# Patient Record
Sex: MALE | Race: BLACK OR AFRICAN AMERICAN | NOT HISPANIC OR LATINO | Employment: FULL TIME | URBAN - METROPOLITAN AREA
[De-identification: names, ages, dates, MRNs, and addresses within clinical notes are randomized per-mention and may not be internally consistent; named-entity substitution may affect disease eponyms.]

---

## 2017-02-27 ENCOUNTER — TELEPHONE (OUTPATIENT)
Dept: INTERNAL MEDICINE | Facility: CLINIC | Age: 47
End: 2017-02-27

## 2017-02-27 NOTE — TELEPHONE ENCOUNTER
Called and left message on home #, cell # voicemail was full, also sending ERIKA message, appt for 3-2-17 has been canceled.  Dr. Beebe is out for family death.  Please call back to reschedule.  Can book with ISAI De Paz here in [place] .

## 2017-03-02 ENCOUNTER — PATIENT OUTREACH (OUTPATIENT)
Dept: ADMINISTRATIVE | Facility: HOSPITAL | Age: 47
End: 2017-03-02

## 2017-03-02 DIAGNOSIS — R97.20 ELEVATED PSA: ICD-10-CM

## 2017-03-02 DIAGNOSIS — Z00.00 HEALTHCARE MAINTENANCE: Primary | ICD-10-CM

## 2017-03-07 ENCOUNTER — OFFICE VISIT (OUTPATIENT)
Dept: URGENT CARE | Facility: CLINIC | Age: 47
End: 2017-03-07
Payer: COMMERCIAL

## 2017-03-07 ENCOUNTER — TELEPHONE (OUTPATIENT)
Dept: INTERNAL MEDICINE | Facility: CLINIC | Age: 47
End: 2017-03-07

## 2017-03-07 VITALS
BODY MASS INDEX: 30.45 KG/M2 | DIASTOLIC BLOOD PRESSURE: 90 MMHG | WEIGHT: 244.94 LBS | TEMPERATURE: 99 F | HEART RATE: 63 BPM | OXYGEN SATURATION: 99 % | SYSTOLIC BLOOD PRESSURE: 164 MMHG | HEIGHT: 75 IN

## 2017-03-07 DIAGNOSIS — I10 ESSENTIAL HYPERTENSION: Primary | ICD-10-CM

## 2017-03-07 PROCEDURE — 99213 OFFICE O/P EST LOW 20 MIN: CPT | Mod: S$GLB,,, | Performed by: PHYSICIAN ASSISTANT

## 2017-03-07 PROCEDURE — 3077F SYST BP >= 140 MM HG: CPT | Mod: S$GLB,,, | Performed by: PHYSICIAN ASSISTANT

## 2017-03-07 PROCEDURE — 3080F DIAST BP >= 90 MM HG: CPT | Mod: S$GLB,,, | Performed by: PHYSICIAN ASSISTANT

## 2017-03-07 PROCEDURE — 1160F RVW MEDS BY RX/DR IN RCRD: CPT | Mod: S$GLB,,, | Performed by: PHYSICIAN ASSISTANT

## 2017-03-07 PROCEDURE — 99999 PR PBB SHADOW E&M-EST. PATIENT-LVL III: CPT | Mod: PBBFAC,,, | Performed by: PHYSICIAN ASSISTANT

## 2017-03-07 RX ORDER — DESOXIMETASONE 0.5 MG/G
OINTMENT TOPICAL
Refills: 1 | COMMUNITY
Start: 2017-02-23

## 2017-03-07 NOTE — TELEPHONE ENCOUNTER
Pt is checked in for urgent care for bp. Informed pt that  does have a 9:40 appt available tomorrow if he wants to come for his preop. He states, he will let me know if he will be able to come tomorrow, if not he will keep appt for 3/13.

## 2017-03-07 NOTE — PROGRESS NOTES
Subjective:      Patient ID: Jimi Clark is a 46 y.o. male.    Chief Complaint: Hypertension    HPI Comments: Mr. Clark is a 45yo male that presents to Urgent Care for elevated BP.  Patient states he takes his BP medication every morning before leaving work and took it this morning around 4AM.  Patient is to have surgery on 3/16 (L knee scope).  Patient was seen for pre-op clearance around 11AM and states his BP was ~198/136 and 179/130.  Patient denies having any caffeinated beverages this morning nor has he been taking any decongestants.  He states he has been working a turnaround, has been busy with his wife and son and takes care of his mother who had a stroke.  He also states his diet has not been very good- he had 6-7lbs of crawfish this weekend and has not been drinking water like he should.  Patient no longer has any refills on his BP medication but has enough to get him through the weekend.  He does have a BP cuff at home but does not monitor it regularly.  Patient denies any symptoms at this time (HA, dizziness, blurred vision, etc.)    Hypertension   This is a new problem. Pertinent negatives include no chest pain, headaches, palpitations or shortness of breath. Past treatments include ACE inhibitors. There are no compliance problems.      Review of Systems   Constitutional: Positive for chills. Negative for diaphoresis and fever.   HENT: Positive for congestion, sinus pressure and sneezing. Negative for sore throat.    Eyes: Negative for visual disturbance (no blurred vision, double vision).   Respiratory: Positive for cough (dry). Negative for shortness of breath and wheezing.    Cardiovascular: Negative for chest pain, palpitations and leg swelling.   Gastrointestinal: Negative for abdominal pain, diarrhea, nausea and vomiting.   Musculoskeletal: Positive for joint swelling (L knee) and myalgias (achey around neck and shoulders).   Neurological: Negative for dizziness, light-headedness and  "headaches.       Objective:   BP (!) 164/90 (BP Location: Right arm, Patient Position: Sitting, BP Method: Manual) Comment: (L) arm 162 94  Pulse 63  Temp 98.5 °F (36.9 °C) (Oral)   Ht 6' 2.5" (1.892 m)  Wt 111.1 kg (244 lb 14.9 oz)  SpO2 99%  BMI 31.03 kg/m2  Physical Exam   Constitutional: He appears well-developed and well-nourished. He does not appear ill. No distress.   HENT:   Head: Normocephalic and atraumatic.   Right Ear: Tympanic membrane and ear canal normal.   Left Ear: Tympanic membrane and ear canal normal.   Nose: Nose normal. Right sinus exhibits no maxillary sinus tenderness and no frontal sinus tenderness. Left sinus exhibits no maxillary sinus tenderness and no frontal sinus tenderness.   Mouth/Throat: Uvula is midline and oropharynx is clear and moist.   Eyes: Pupils are equal, round, and reactive to light.   Cardiovascular: Normal rate, regular rhythm and normal heart sounds.    Pulmonary/Chest: Effort normal and breath sounds normal. No respiratory distress. He has no decreased breath sounds. He has no wheezes. He has no rhonchi. He has no rales.   Skin: Skin is warm and dry. No rash noted. He is not diaphoretic.   Psychiatric: He has a normal mood and affect. His speech is normal and behavior is normal. Thought content normal.     Assessment:      1. Essential hypertension       Plan:   Essential hypertension    Gave patient handout on BP and DASH diet.  Printed and reviewed AVS.     Patient to see Dr. Beebe at pre-op appointment tomorrow morning.  BP will be re-evaluated at that time.  If he develops any signs/symptoms (chest pain, severe HA, dizziness, blurred vision) in the mean time he should seek care immediately at the ER.   Recommend DASH diet and monitoring BP regularly at home.    Patient expresses understanding and agrees with treatment plan.  "

## 2017-03-07 NOTE — TELEPHONE ENCOUNTER
----- Message from Christi Heredia sent at 3/7/2017 11:54 AM CST -----  Contact: pt  Pt states he needs to move his 3/13 appointment-pre-op cause his blood pressure is high and they told him to see his PCP before can do surgery so pt would like to be seen today,please....[phone]

## 2017-03-08 ENCOUNTER — OFFICE VISIT (OUTPATIENT)
Dept: INTERNAL MEDICINE | Facility: CLINIC | Age: 47
End: 2017-03-08
Payer: COMMERCIAL

## 2017-03-08 VITALS
HEART RATE: 86 BPM | HEIGHT: 75 IN | WEIGHT: 246.25 LBS | SYSTOLIC BLOOD PRESSURE: 158 MMHG | BODY MASS INDEX: 30.62 KG/M2 | TEMPERATURE: 98 F | DIASTOLIC BLOOD PRESSURE: 102 MMHG

## 2017-03-08 DIAGNOSIS — M25.562 CHRONIC PAIN OF LEFT KNEE: Primary | ICD-10-CM

## 2017-03-08 DIAGNOSIS — G89.29 CHRONIC PAIN OF LEFT KNEE: Primary | ICD-10-CM

## 2017-03-08 DIAGNOSIS — R09.81 SINUS CONGESTION: ICD-10-CM

## 2017-03-08 DIAGNOSIS — I10 ESSENTIAL HYPERTENSION: ICD-10-CM

## 2017-03-08 DIAGNOSIS — Z01.818 PREOP EXAMINATION: ICD-10-CM

## 2017-03-08 PROCEDURE — 93005 ELECTROCARDIOGRAM TRACING: CPT | Mod: S$GLB,,, | Performed by: FAMILY MEDICINE

## 2017-03-08 PROCEDURE — 99999 PR PBB SHADOW E&M-EST. PATIENT-LVL III: CPT | Mod: PBBFAC,,, | Performed by: FAMILY MEDICINE

## 2017-03-08 PROCEDURE — 99214 OFFICE O/P EST MOD 30 MIN: CPT | Mod: S$GLB,,, | Performed by: FAMILY MEDICINE

## 2017-03-08 PROCEDURE — 1160F RVW MEDS BY RX/DR IN RCRD: CPT | Mod: S$GLB,,, | Performed by: FAMILY MEDICINE

## 2017-03-08 PROCEDURE — 93010 ELECTROCARDIOGRAM REPORT: CPT | Mod: S$GLB,,, | Performed by: INTERNAL MEDICINE

## 2017-03-08 PROCEDURE — 3080F DIAST BP >= 90 MM HG: CPT | Mod: S$GLB,,, | Performed by: FAMILY MEDICINE

## 2017-03-08 PROCEDURE — 3077F SYST BP >= 140 MM HG: CPT | Mod: S$GLB,,, | Performed by: FAMILY MEDICINE

## 2017-03-08 RX ORDER — TRAMADOL HYDROCHLORIDE 50 MG/1
1 TABLET ORAL
Refills: 0 | COMMUNITY
Start: 2017-02-22 | End: 2018-07-02

## 2017-03-08 RX ORDER — LISINOPRIL AND HYDROCHLOROTHIAZIDE 12.5; 2 MG/1; MG/1
1 TABLET ORAL DAILY
Qty: 90 TABLET | Refills: 1 | Status: SHIPPED | OUTPATIENT
Start: 2017-03-08 | End: 2017-08-09 | Stop reason: SDUPTHER

## 2017-03-08 NOTE — PROGRESS NOTES
"Subjective:      Patient ID: Jimi Clark is a 46 y.o. male.    Chief Complaint: Pre-op Exam (Dr. Desmond Leyva /left knee surgery)    HPI  47 yo male here today for preop assessment.  Schedule for L knee scope on March 16th, 2017 with Dr. Alvarez.  Not been seen by myself in over a year.  Had [facility] physical Jan 2016 and had some EKG changes, was advised to see Cardiology.  He did not do so.  BP has been uncontrolled for a little while.  He has gained nearly 20 lbs since last visit.  Denies CP/SOB.  No hx of DVT/PE  Denies any anesthesia complications  Currently for past day, having PND, cough/sinus congestion.  Took one Claritin D this AM.    Past Medical History:   Diagnosis Date    Back pain     Dr. 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 Negative for appetite change, chills, diaphoresis, fatigue and fever.   HENT: Negative for hearing loss and tinnitus.    Eyes: Negative for visual disturbance.   Respiratory: Negative for cough, chest tightness, shortness of breath and wheezing.    Cardiovascular: Negative for chest pain, palpitations and leg swelling.   Gastrointestinal: Negative for " abdominal distention, abdominal pain, constipation and diarrhea.   Genitourinary: Negative for difficulty urinating.   Musculoskeletal: Positive for arthralgias.   Neurological: Negative for dizziness, weakness and headaches.     Objective:     Physical Exam   Constitutional: He is oriented to person, place, and time. He appears well-developed and well-nourished.   HENT:   Right Ear: External ear normal.   Left Ear: External ear normal.   Mouth/Throat: Oropharynx is clear and moist. No oropharyngeal exudate.   L nasal turbinate swollen   Eyes: Pupils are equal, round, and reactive to light.   Neck: Neck supple.   Cardiovascular: Normal rate, regular rhythm and normal heart sounds.    Pulmonary/Chest: Effort normal and breath sounds normal. No respiratory distress. He has no wheezes.   Abdominal: Soft. Bowel sounds are normal. He exhibits no distension.   Musculoskeletal: He exhibits no edema.   Lymphadenopathy:     He has no cervical adenopathy.   Neurological: He is alert and oriented to person, place, and time.   Skin: Skin is warm and dry.   Psychiatric: He has a normal mood and affect. His behavior is normal. Judgment and thought content normal.   Nursing note and vitals reviewed.      Lab Results   Component Value Date    WBC 5.62 01/05/2016    HGB 14.0 01/05/2016    HCT 40.8 01/05/2016     01/05/2016    CHOL 171 01/05/2016    TRIG 68 01/05/2016    HDL 64 01/05/2016    ALT 28 01/05/2016    AST 29 01/05/2016     01/05/2016    K 3.9 01/05/2016     01/05/2016    CREATININE 1.3 01/05/2016    BUN 11 01/05/2016    CO2 28 01/05/2016    TSH 1.592 01/05/2016    PSA 1.4 01/06/2015    HGBA1C 5.0 01/05/2016       Assessment:     1. Chronic pain of left knee    2. Essential hypertension    3. Sinus congestion    4. Preop examination       Plan:   Chronic pain of left knee    Essential hypertension    Sinus congestion    Preop examination  -     IN OFFICE EKG 12-LEAD (to [place])    Other orders  -      lisinopril-hydrochlorothiazide (PRINZIDE,ZESTORETIC) 20-12.5 mg per tablet; Take 1 tablet by mouth once daily.  Dispense: 90 tablet; Refill: 1    Labs done with [facility], will be faxed over.  Will repeat EKG here today for Cards to review.  If change from prior after last stress test, will need to see Cards for preop clearance.  BP uncontrolled.  DASH diet.  Med changed, start lis/hct 20/12.5mg daily.  Once clearance is ready, will send to Dr. Desmond Alvarez.  F/u with me to be determined on above/Bp

## 2017-03-08 NOTE — MR AVS SNAPSHOT
[facility]  [address] [place] [address]  Phone: [phone]  Fax: [phone]                  Jimijoie Clark   3/8/2017 9:40 AM   Office Visit    Description:  Male : 1970   Provider:  Mack Beebe MD   [facility]           Reason for Visit     Pre-op Exam           Diagnoses this Visit        Comments    Chronic pain of left knee    -  Primary     Essential hypertension         Sinus congestion         Preop examination                To Do List           Goals (5 Years of Data)     None       These Medications        Disp Refills Start End    lisinopril-hydrochlorothiazide (PRINZIDE,ZESTORETIC) 20-12.5 mg per tablet 90 tablet 1 3/8/2017 3/8/2018    Take 1 tablet by mouth once daily. - Oral    Pharmacy: [id number]  [place] - [address] AT [facility] [address] Ph #: [phone]         [facility]     [facility] On [facility] Nurse Care Line -  Assistance  Registered nurses in the [facility] provide clinical advisement, health education, appointment booking, and other advisory services.  Call for this free service at [phone].             Medications           Message regarding Medications     Verify the changes and/or additions to your medication regime listed below are the same as discussed with your clinician today.  If any of these changes or additions are incorrect, please notify your healthcare provider.        START taking these NEW medications        Refills    lisinopril-hydrochlorothiazide (PRINZIDE,ZESTORETIC) 20-12.5 mg per tablet 1    Sig: Take 1 tablet by mouth once daily.    Class: Normal    Route: Oral      STOP taking these medications     lisinopril 10 MG tablet Take 1 tablet (10 mg total) by mouth once daily.           Verify that the below list of medications is an accurate representation of the medications you are currently taking.  If none  "reported, the list may be blank. If incorrect, please contact your healthcare provider. Carry this list with you in case of emergency.           Current Medications     azelastine-fluticasone 137-50 mcg/spray [place] 1 spray by Nasal route.    desoximetasone 0.05 % Oint ELODIA TO CHEST BID    lisinopril-hydrochlorothiazide (PRINZIDE,ZESTORETIC) 20-12.5 mg per tablet Take 1 tablet by mouth once daily.    tramadol (ULTRAM) 50 mg tablet Take 1 tablet by mouth every 3 (three) hours.           Clinical Reference Information           Your Vitals Were     BP Pulse Temp Height Weight BMI    158/102 86 98.1 °F (36.7 °C) (Tympanic) 6' 2.75" (1.899 m) 111.7 kg (246 lb 4.1 oz) 30.99 kg/m2      Blood Pressure          Most Recent Value    BP  (!)  158/102      Allergies as of 3/8/2017     No Known Allergies      Immunizations Administered on Date of Encounter - 3/8/2017     None      Orders Placed During Today's Visit      Normal Orders This Visit    IN OFFICE EKG 12-LEAD (to [place])       Language Assistance Services     ATTENTION: Language assistance services are available, free of charge. Please call [phone].      ATENCIÓN: Si habla ayah, tiene a pizarro disposición servicios gratuitos de asistencia lingüística. Llame al [phone].     [facility] Ý: N?u b?n nói Ti?ng Vi?t, có các d?ch v? h? tr? ngôn ng? mi?n phí dành cho b?n. G?i s? [phone].         [facility] complies with applicable Federal civil rights laws and does not discriminate on the basis of race, color, national origin, age, disability, or sex.        "

## 2017-03-09 ENCOUNTER — TELEPHONE (OUTPATIENT)
Dept: INTERNAL MEDICINE | Facility: CLINIC | Age: 47
End: 2017-03-09

## 2017-03-09 DIAGNOSIS — R94.31 ABNORMAL EKG: ICD-10-CM

## 2017-03-09 DIAGNOSIS — Z01.818 PREOP EXAMINATION: ICD-10-CM

## 2017-03-09 DIAGNOSIS — I10 ESSENTIAL HYPERTENSION: Primary | ICD-10-CM

## 2017-03-09 NOTE — TELEPHONE ENCOUNTER
Have not received labs.  Just got EKG, I recommend cardiac clearance for surgery.  Order in for Cards.

## 2017-03-09 NOTE — TELEPHONE ENCOUNTER
----- Message from Darrel Salinas sent at 3/9/2017  2:52 PM CST -----  Contact: Pt  Pt request call from nurse to get results of labs and EKG, please contact pt at [phone]

## 2017-03-14 ENCOUNTER — TELEPHONE (OUTPATIENT)
Dept: INTERNAL MEDICINE | Facility: CLINIC | Age: 47
End: 2017-03-14

## 2017-03-14 ENCOUNTER — OFFICE VISIT (OUTPATIENT)
Dept: CARDIOLOGY | Facility: CLINIC | Age: 47
End: 2017-03-14
Payer: COMMERCIAL

## 2017-03-14 ENCOUNTER — CLINICAL SUPPORT (OUTPATIENT)
Dept: CARDIOLOGY | Facility: CLINIC | Age: 47
End: 2017-03-14
Payer: COMMERCIAL

## 2017-03-14 VITALS
HEIGHT: 75 IN | WEIGHT: 239.88 LBS | DIASTOLIC BLOOD PRESSURE: 84 MMHG | SYSTOLIC BLOOD PRESSURE: 134 MMHG | HEART RATE: 72 BPM | BODY MASS INDEX: 29.83 KG/M2

## 2017-03-14 DIAGNOSIS — Z01.810 PREOP CARDIOVASCULAR EXAM: Primary | ICD-10-CM

## 2017-03-14 DIAGNOSIS — Z82.49 FAMILY HISTORY OF VASCULAR DISEASE: ICD-10-CM

## 2017-03-14 DIAGNOSIS — R94.31 EKG ABNORMALITIES: ICD-10-CM

## 2017-03-14 DIAGNOSIS — I10 ESSENTIAL HYPERTENSION: ICD-10-CM

## 2017-03-14 DIAGNOSIS — Z01.810 PREOP CARDIOVASCULAR EXAM: ICD-10-CM

## 2017-03-14 LAB
DIASTOLIC DYSFUNCTION: NO
ESTIMATED PA SYSTOLIC PRESSURE: 27.21
RETIRED EF AND QEF - SEE NOTES: 55 (ref 55–65)

## 2017-03-14 PROCEDURE — 93306 TTE W/DOPPLER COMPLETE: CPT | Mod: S$GLB,,, | Performed by: INTERNAL MEDICINE

## 2017-03-14 PROCEDURE — 99999 PR PBB SHADOW E&M-EST. PATIENT-LVL II: CPT | Mod: PBBFAC,,, | Performed by: INTERNAL MEDICINE

## 2017-03-14 PROCEDURE — 99244 OFF/OP CNSLTJ NEW/EST MOD 40: CPT | Mod: S$GLB,,, | Performed by: INTERNAL MEDICINE

## 2017-03-14 NOTE — LETTER
March 14, 2017      Mack Beebe MD  [address] [place] [address]           [facility]  [address] [place] [address]  Phone: [phone]  Fax: [phone]          Patient: Jimi Clark   MR Number: [id number]   YOB: 1970   Date of Visit: 3/14/2017       Dear Dr. Mack Beebe:    Thank you for referring Jimi Clark to me for evaluation. Attached you will find relevant portions of my assessment and plan of care.    If you have questions, please do not hesitate to call me. I look forward to following Jimi Clark along with you.    Sincerely,    Oliver Guidry MD    Enclosure  CC:  No Recipients    If you would like to receive this communication electronically, please contact [email or web address] or [phone] to request more information on [employer] Link access.    For providers and/or their staff who would like to refer a patient to [facility], please contact us through our one-stop-shop provider referral line, [facility], at [phone].    If you feel you have received this communication in error or would no longer like to receive these types of communications, please e-mail [email or web address]

## 2017-03-14 NOTE — TELEPHONE ENCOUNTER
----- Message from Lorene Guthrie sent at 3/14/2017  9:25 AM CDT -----  Contact: yolis hodgson/ dr avendaño  pls fax surgery clearance today for 3/16 appt to her at [phone]//ph:[phone]

## 2017-03-14 NOTE — PROGRESS NOTES
Subjective:   Patient ID:  Jimi Clark is a 46 y.o. male who presents for evaluation of Hypertension      HPI Comments: 45 yo male. Came in for preop clearance of left knee arthroscopy. Dr. Desmond Alvarez. [phone]  PMH HTN  No chest pain, dyspnea, palpitation, dizziness and syncope.  No smoking/drinking.  Mother had first stroke at 52. Father had first stroke at 64. One brother had heart issue at 40.  Treadmill stress test in 2013 MEt 13 and negative EKG change      Past Medical History:   Diagnosis Date    Back pain     Dr. 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      TONSILLECTOMY      WISDOM TOOTH EXTRACTION         Social History   Substance Use Topics    Smoking status: Never Smoker    Smokeless tobacco: None    Alcohol use No       Family History   Problem Relation Age of Onset    Alcohol abuse Mother     Stroke Mother     Heart disease Mother     Heart disease Father     Stroke Father     Heart disease Maternal Aunt     Heart attack Maternal Aunt 59    Heart disease Maternal Grandmother     Cancer Maternal Grandfather      prostate       Review of Systems   Constitution: Negative for decreased appetite, diaphoresis, fever, weakness, malaise/fatigue and night sweats.   HENT: Negative for headaches and nosebleeds.    Eyes: Negative for blurred vision and double vision.   Cardiovascular: Negative for chest pain, claudication, dyspnea on exertion, irregular heartbeat, leg swelling, near-syncope, orthopnea, palpitations, paroxysmal nocturnal dyspnea and syncope.   Respiratory: Negative for cough, shortness of breath, sleep disturbances due to breathing, snoring, sputum production and wheezing.    Endocrine: Negative for cold intolerance and polyuria.   Hematologic/Lymphatic: Does not bruise/bleed easily.   Skin: Negative for rash.   Musculoskeletal:  Positive for arthritis, joint pain and joint swelling. Negative for back pain, falls and neck pain.   Gastrointestinal: Negative for abdominal pain, heartburn, nausea and vomiting.   Genitourinary: Negative for dysuria, frequency and hematuria.   Neurological: Negative for difficulty with concentration, dizziness, focal weakness, light-headedness, numbness and seizures.   Psychiatric/Behavioral: Negative for depression, memory loss and substance abuse. The patient does not have insomnia.    Allergic/Immunologic: Negative for HIV exposure and hives.       Objective:   Physical Exam   Constitutional: He is oriented to person, place, and time. He appears well-nourished.   HENT:   Head: Normocephalic.   Eyes: Pupils are equal, round, and reactive to light.   Neck: Normal carotid pulses and no JVD present. Carotid bruit is not present. No thyromegaly present.   Cardiovascular: Normal rate, regular rhythm, normal heart sounds and normal pulses.   No extrasystoles are present. PMI is not displaced.  Exam reveals no gallop and no S3.    No murmur heard.  Pulmonary/Chest: Breath sounds normal. No stridor. No respiratory distress.   Abdominal: Soft. Bowel sounds are normal. There is no tenderness. There is no rebound.   Musculoskeletal: Normal range of motion.   Neurological: He is alert and oriented to person, place, and time.   Skin: Skin is intact. No rash noted.   Psychiatric: His behavior is normal.       Lab Results   Component Value Date    CHOL 171 01/05/2016    CHOL 158 01/06/2015    CHOL 153 11/17/2014     Lab Results   Component Value Date    HDL 64 01/05/2016    HDL 49 01/06/2015    HDL 56 11/17/2014     Lab Results   Component Value Date    LDLCALC 93.4 01/05/2016    LDLCALC 96.2 01/06/2015    LDLCALC 88.8 11/17/2014     Lab Results   Component Value Date    TRIG 68 01/05/2016    TRIG 64 01/06/2015    TRIG 41 11/17/2014     Lab Results   Component Value Date    CHOLHDL 37.4 01/05/2016    CHOLHDL 31.0 01/06/2015     CHOLHDL 36.6 11/17/2014       Chemistry        Component Value Date/Time     01/05/2016 0949    K 3.9 01/05/2016 0949     01/05/2016 0949    CO2 28 01/05/2016 0949    BUN 11 01/05/2016 0949    CREATININE 1.3 01/05/2016 0949    GLU 94 01/05/2016 0949        Component Value Date/Time    CALCIUM 9.5 01/05/2016 0949    ALKPHOS 54 (L) 01/05/2016 0949    AST 29 01/05/2016 0949    ALT 28 01/05/2016 0949    BILITOT 0.6 01/05/2016 0949          Lab Results   Component Value Date    TSH 1.592 01/05/2016     No results found for: INR, PROTIME  Lab Results   Component Value Date    WBC 5.62 01/05/2016    HGB 14.0 01/05/2016    HCT 40.8 01/05/2016    MCV 83 01/05/2016     01/05/2016     BNP  @LABRCNTIP(BNP,BNPTRIAGEBLO)@  CrCl cannot be calculated (Patient has no serum creatinine result on file.).     Assessment:      1. Preop cardiovascular exam    2. EKG abnormalities    3. Essential hypertension    4. Family history of vascular disease      Reviewed EKG in the office with pt. NSR, TWI on v3 to V6, likely due to LVH with 2nd TW change, not consider lateral ischemia.    Plan:   Echo order and will clarify after study   DASh  Continue Prinzide.  RTC as needed    Addendum on 03-  ECHO showed moderate LVH and normal EF.  Low periop risk of CV events for moderate risk procedure.  Good functional and exercise capacity.  No chest pain, active arrhythmia and CHF symptoms.  Ok to proceed the scheduled surgery without further cardiac study.  Avoid periop fluid overloaded.

## 2017-03-14 NOTE — TELEPHONE ENCOUNTER
----- Message from Irwin Braun sent at 3/14/2017  9:13 AM CDT -----  Contact: Sarah avendaño's [phone]   Would like pre-op clearance, please fax clearance to  [phone].  Please call back @ [phone]. Thanks-[facility]

## 2017-03-14 NOTE — TELEPHONE ENCOUNTER
Notified Leticia that pt has to be cleared by cardiology first. Pt has an appt today at 2 PM with  at [facility]. She verbalized understanding.

## 2017-03-15 ENCOUNTER — TELEPHONE (OUTPATIENT)
Dept: CARDIOLOGY | Facility: CLINIC | Age: 47
End: 2017-03-15

## 2017-03-15 ENCOUNTER — TELEPHONE (OUTPATIENT)
Dept: INTERNAL MEDICINE | Facility: CLINIC | Age: 47
End: 2017-03-15

## 2017-03-15 NOTE — TELEPHONE ENCOUNTER
----- Message from Kris Tong sent at 3/15/2017  1:48 PM CDT -----  Contact: pt  She's calling in regards to a surgery clearance, pt is scheduled for surgery on tomorrow 3/16/17, please fax this to: [phone]/ # [phone], please advise that multiple messages have been left concerning this and has not heard from anyone......

## 2017-03-15 NOTE — TELEPHONE ENCOUNTER
----- Message from Sherrie Banuelos sent at 3/15/2017  3:39 PM CDT -----  Contact: Pt   Pt called and stated he needed to speak to the nurse. He stated he needs a pre op clearance faxed to Dr. Desmond Alvarez [phone] by the end of the day.  He can be reached at [phone].    Thanks,  TF

## 2017-03-15 NOTE — TELEPHONE ENCOUNTER
----- Message from Carleen Berger sent at 3/15/2017  1:38 PM CDT -----  Contact: [facility]/Dr Alvarez  Please call nurse @[phone] , regarding pt pre op clearance.

## 2017-03-15 NOTE — TELEPHONE ENCOUNTER
----- Message from Kris Tong sent at 3/15/2017  9:04 AM CDT -----  Contact: rema kaba/ Dr EDEL Alvarez  She's calling in regards to a surgery clearence for surgery, pt's surgery is scheduled for 7 am 3/16/17, please fax: [phone], please advise, # [phone]..

## 2017-03-15 NOTE — TELEPHONE ENCOUNTER
Called and let Dr. Leyva office know that Dr. Beebe is waiting on cardiology to clear pt.  See message from there nurse sent to Dr. Guidry asking if pt cleared and he hasn't responded yet.    She said they have to know by end of day as his surgery is scheduled for first thing in the morning.

## 2017-03-15 NOTE — TELEPHONE ENCOUNTER
Received call from Dr. Leyva office stating they heard from Cardiology, saying they cleared pt.  They will fax over the clearance.  Waiting on Dr. Leyva office to fax over cardiology clearance and then will have to see if Dr. Thakkar or Dr. James can sign off on this.  They said they have to have a signature of DrArben .      Dr. Guidry note says pt is cleared for surgery, Dr. James signed Dr. Beebe's pre op clearance note since she is out.  Faxed to [phone].  Called Leticia and asked if she received the fax?  She said they received fax.    Called pt and left message that clearance faxed and received, per Leticia at Dr. Leyva office.

## 2017-03-15 NOTE — TELEPHONE ENCOUNTER
----- Message from Khanh De Paz sent at 3/15/2017  2:32 PM CDT -----  Pt is requesting to speak with the nurse./ States he is scheduled for surgery in the morning and he needs the clearance sent to Dr. Desmond Alvarez [phone]/fax. He also needs it sent to his PCP Dr. Bauman at [facility] [phone]/fax.  Pt can be reached at [phone]

## 2017-07-28 ENCOUNTER — LAB VISIT (OUTPATIENT)
Dept: LAB | Facility: HOSPITAL | Age: 47
End: 2017-07-28
Attending: INTERNAL MEDICINE
Payer: COMMERCIAL

## 2017-07-28 ENCOUNTER — OFFICE VISIT (OUTPATIENT)
Dept: INTERNAL MEDICINE | Facility: CLINIC | Age: 47
End: 2017-07-28
Payer: COMMERCIAL

## 2017-07-28 VITALS
HEART RATE: 68 BPM | BODY MASS INDEX: 29.85 KG/M2 | WEIGHT: 240.06 LBS | HEIGHT: 75 IN | SYSTOLIC BLOOD PRESSURE: 140 MMHG | DIASTOLIC BLOOD PRESSURE: 90 MMHG | TEMPERATURE: 98 F

## 2017-07-28 DIAGNOSIS — R19.5 STOOL CLAY COLOR: ICD-10-CM

## 2017-07-28 DIAGNOSIS — I10 ESSENTIAL HYPERTENSION: ICD-10-CM

## 2017-07-28 DIAGNOSIS — R53.83 FATIGUE, UNSPECIFIED TYPE: ICD-10-CM

## 2017-07-28 DIAGNOSIS — R53.83 FATIGUE, UNSPECIFIED TYPE: Primary | ICD-10-CM

## 2017-07-28 LAB
ALBUMIN SERPL BCP-MCNC: 4.1 G/DL
ALP SERPL-CCNC: 53 U/L
ALT SERPL W/O P-5'-P-CCNC: 16 U/L
ANION GAP SERPL CALC-SCNC: 10 MMOL/L
AST SERPL-CCNC: 18 U/L
BASOPHILS # BLD AUTO: 0.05 K/UL
BASOPHILS NFR BLD: 0.9 %
BILIRUB DIRECT SERPL-MCNC: 0.3 MG/DL
BILIRUB SERPL-MCNC: 0.6 MG/DL
BILIRUB UR QL STRIP: NEGATIVE
BUN SERPL-MCNC: 18 MG/DL
CALCIUM SERPL-MCNC: 9.4 MG/DL
CHLORIDE SERPL-SCNC: 105 MMOL/L
CLARITY UR REFRACT.AUTO: CLEAR
CO2 SERPL-SCNC: 26 MMOL/L
COLOR UR AUTO: YELLOW
COMPLEXED PSA SERPL-MCNC: 1.8 NG/ML
CREAT SERPL-MCNC: 1.4 MG/DL
DIFFERENTIAL METHOD: ABNORMAL
EOSINOPHIL # BLD AUTO: 0.3 K/UL
EOSINOPHIL NFR BLD: 5.1 %
ERYTHROCYTE [DISTWIDTH] IN BLOOD BY AUTOMATED COUNT: 13.5 %
EST. GFR  (AFRICAN AMERICAN): >60 ML/MIN/1.73 M^2
EST. GFR  (NON AFRICAN AMERICAN): >60 ML/MIN/1.73 M^2
ESTIMATED AVG GLUCOSE: 105 MG/DL
GLUCOSE SERPL-MCNC: 89 MG/DL
GLUCOSE UR QL STRIP: NEGATIVE
HBA1C MFR BLD HPLC: 5.3 %
HCT VFR BLD AUTO: 39.4 %
HGB BLD-MCNC: 14 G/DL
HGB UR QL STRIP: NEGATIVE
KETONES UR QL STRIP: NEGATIVE
LEUKOCYTE ESTERASE UR QL STRIP: NEGATIVE
LYMPHOCYTES # BLD AUTO: 1.2 K/UL
LYMPHOCYTES NFR BLD: 22.1 %
MCH RBC QN AUTO: 28.7 PG
MCHC RBC AUTO-ENTMCNC: 35.5 G/DL
MCV RBC AUTO: 81 FL
MONOCYTES # BLD AUTO: 0.4 K/UL
MONOCYTES NFR BLD: 6.6 %
NEUTROPHILS # BLD AUTO: 3.6 K/UL
NEUTROPHILS NFR BLD: 64.9 %
NITRITE UR QL STRIP: NEGATIVE
PH UR STRIP: 5 [PH] (ref 5–8)
PLATELET # BLD AUTO: 169 K/UL
PMV BLD AUTO: 10.7 FL
POTASSIUM SERPL-SCNC: 4.2 MMOL/L
PROT SERPL-MCNC: 7.2 G/DL
PROT UR QL STRIP: NEGATIVE
RBC # BLD AUTO: 4.87 M/UL
SODIUM SERPL-SCNC: 141 MMOL/L
SP GR UR STRIP: 1.01 (ref 1–1.03)
TSH SERPL DL<=0.005 MIU/L-ACNC: 0.86 UIU/ML
URN SPEC COLLECT METH UR: NORMAL
UROBILINOGEN UR STRIP-ACNC: NEGATIVE EU/DL
WBC # BLD AUTO: 5.48 K/UL

## 2017-07-28 PROCEDURE — 80076 HEPATIC FUNCTION PANEL: CPT

## 2017-07-28 PROCEDURE — 84153 ASSAY OF PSA TOTAL: CPT

## 2017-07-28 PROCEDURE — 84443 ASSAY THYROID STIM HORMONE: CPT

## 2017-07-28 PROCEDURE — 99214 OFFICE O/P EST MOD 30 MIN: CPT | Mod: S$GLB,,, | Performed by: PHYSICIAN ASSISTANT

## 2017-07-28 PROCEDURE — 81003 URINALYSIS AUTO W/O SCOPE: CPT

## 2017-07-28 PROCEDURE — 83036 HEMOGLOBIN GLYCOSYLATED A1C: CPT

## 2017-07-28 PROCEDURE — 85025 COMPLETE CBC W/AUTO DIFF WBC: CPT

## 2017-07-28 PROCEDURE — 99999 PR PBB SHADOW E&M-EST. PATIENT-LVL III: CPT | Mod: PBBFAC,,, | Performed by: PHYSICIAN ASSISTANT

## 2017-07-28 PROCEDURE — 80048 BASIC METABOLIC PNL TOTAL CA: CPT

## 2017-07-28 PROCEDURE — 36415 COLL VENOUS BLD VENIPUNCTURE: CPT | Mod: PO

## 2017-07-28 NOTE — PROGRESS NOTES
Subjective:       Patient ID: Jimi Clark is a 46 y.o. male.    Chief Complaint: Fatigue  Patient comes in today with new symptom of fatigue.   He states since this past weekend he feels that he cannot catch up on rest.  He works on/off --day/night shifts but usually has no issues with this.   This is his only symptom. He does state he snores a good bit. Unsure of apneic episodes.   He did stop his BP medication thinking this could be the problem. He does have BP cuff at home to check his pressure. He also states his stool as of late has been a little lighter/ states it is light yellow.   No change in bowel habits. No n/v, no abdominal pain   Fatigue   This is a new problem. The current episode started in the past 7 days. The problem occurs daily. The problem has been gradually improving. Associated symptoms include fatigue and neck pain. Pertinent negatives include no abdominal pain, anorexia, arthralgias, change in bowel habit, chest pain, chills, congestion, coughing, diaphoresis, fever, headaches, joint swelling, myalgias, nausea, numbness, rash, sore throat, swollen glands, urinary symptoms, vertigo, visual change, vomiting or weakness. Nothing aggravates the symptoms. He has tried lying down and sleep for the symptoms. The treatment provided mild relief.       Past Medical History:   Diagnosis Date    Back pain     Dr. Arun Ferreira    HTN (hypertension)     Seasonal allergies        Current Outpatient Prescriptions   Medication Sig Dispense Refill    azelastine-fluticasone 137-50 mcg/spray [place] 1 spray by Nasal route.      desoximetasone 0.05 % Oint ELODIA TO CHEST BID  1    lisinopril-hydrochlorothiazide (PRINZIDE,ZESTORETIC) 20-12.5 mg per tablet Take 1 tablet by mouth once daily. 90 tablet 1    tramadol (ULTRAM) 50 mg tablet Take 1 tablet by mouth every 3 (three) hours.  0     No current facility-administered medications for this visit.        Review of Systems   Constitutional: Positive for  "fatigue. Negative for chills, diaphoresis and fever.   HENT: Negative for congestion and sore throat.    Respiratory: Negative for cough.    Cardiovascular: Negative for chest pain.   Gastrointestinal: Negative for abdominal pain, anorexia, change in bowel habit, nausea and vomiting.   Musculoskeletal: Positive for neck pain. Negative for arthralgias, joint swelling and myalgias.   Skin: Negative for rash.   Neurological: Negative for vertigo, weakness, numbness and headaches.       Objective:   BP (!) 140/90   Pulse 68   Temp 97.6 °F (36.4 °C) (Tympanic)   Ht 6' 3" (1.905 m)   Wt 108.9 kg (240 lb 1.3 oz)   BMI 30.01 kg/m²      Physical Exam   Constitutional: He is oriented to person, place, and time. He appears well-developed and well-nourished. No distress.   HENT:   Head: Normocephalic and atraumatic.   Right Ear: External ear normal.   Left Ear: External ear normal.   Nose: Nose normal.   Mouth/Throat: Oropharynx is clear and moist. No oropharyngeal exudate.   TMs normal.    Eyes: Conjunctivae and EOM are normal. Pupils are equal, round, and reactive to light.   Neck: Normal range of motion. Neck supple.   Cardiovascular: Normal rate, regular rhythm and normal heart sounds.    Pulmonary/Chest: Effort normal and breath sounds normal.   Abdominal: Soft. Bowel sounds are normal.   Musculoskeletal: Normal range of motion.   Neurological: He is alert and oriented to person, place, and time.   Skin: Skin is warm.   Psychiatric: He has a normal mood and affect. His behavior is normal. Judgment and thought content normal.   Vitals reviewed.        Lab Results   Component Value Date    WBC 5.48 07/28/2017    HGB 14.0 07/28/2017    HCT 39.4 (L) 07/28/2017     07/28/2017    CHOL 171 01/05/2016    TRIG 68 01/05/2016    HDL 64 01/05/2016    ALT 16 07/28/2017    AST 18 07/28/2017     07/28/2017    K 4.2 07/28/2017     07/28/2017    CREATININE 1.4 07/28/2017    BUN 18 07/28/2017    CO2 26 07/28/2017    " TSH 0.865 07/28/2017    PSA 1.8 07/28/2017    HGBA1C 5.3 07/28/2017       Assessment:       1. Fatigue, unspecified type    2. Stool ronald color    3. Essential hypertension        Plan:   Fatigue, unspecified type  -     TSH; Future; Expected date: 07/28/2017  -     PSA, Screening; Future; Expected date: 07/28/2017  -     CBC auto differential; Future; Expected date: 07/28/2017  -     Hemoglobin A1c; Future; Expected date: 07/28/2017  -     Urinalysis  -     Cancel: Basic metabolic panel; Future; Expected date: 07/28/2017  -     Basic metabolic panel; Future; Expected date: 07/28/2017    Stool ronald color  -     Comprehensive metabolic panel; Future; Expected date: 07/28/2017  -     Hepatic function panel; Future; Expected date: 07/28/2017  -     Basic metabolic panel; Future; Expected date: 07/28/2017    Essential hypertension  -     Cancel: Basic metabolic panel; Future; Expected date: 07/28/2017  -     Basic metabolic panel; Future; Expected date: 07/28/2017    will work up as above   Discussed to have wife check for periods of apnea while sleeping, this could help explain symptoms

## 2017-08-09 ENCOUNTER — OFFICE VISIT (OUTPATIENT)
Dept: INTERNAL MEDICINE | Facility: CLINIC | Age: 47
End: 2017-08-09
Payer: COMMERCIAL

## 2017-08-09 VITALS
HEART RATE: 72 BPM | TEMPERATURE: 97 F | BODY MASS INDEX: 30.13 KG/M2 | SYSTOLIC BLOOD PRESSURE: 126 MMHG | DIASTOLIC BLOOD PRESSURE: 80 MMHG | WEIGHT: 242.31 LBS | HEIGHT: 75 IN

## 2017-08-09 DIAGNOSIS — Z00.00 ROUTINE GENERAL MEDICAL EXAMINATION AT A HEALTH CARE FACILITY: Primary | ICD-10-CM

## 2017-08-09 PROCEDURE — 99999 PR PBB SHADOW E&M-EST. PATIENT-LVL III: CPT | Mod: PBBFAC,,, | Performed by: FAMILY MEDICINE

## 2017-08-09 PROCEDURE — 99396 PREV VISIT EST AGE 40-64: CPT | Mod: S$GLB,,, | Performed by: FAMILY MEDICINE

## 2017-08-09 RX ORDER — LISINOPRIL AND HYDROCHLOROTHIAZIDE 12.5; 2 MG/1; MG/1
1 TABLET ORAL DAILY
Qty: 90 TABLET | Refills: 4 | Status: SHIPPED | OUTPATIENT
Start: 2017-08-09 | End: 2018-01-15 | Stop reason: SDUPTHER

## 2017-08-09 NOTE — PROGRESS NOTES
"Subjective:      Patient ID: Jimi Clark is a 46 y.o. male.    Chief Complaint: Hypertension (follow up)    HPI  45 yo male here for general check up.  Saw Lilibeth not long ago, had felt really bad with fatigue/change in stool.  Had been out, gotten multiple  Mosquito bites around the time.  Had labs/UA checked.  All within normal.  Feeling fine now.  Focused on better eating/exercise.  BP well controlled, follows low sodium diet closely.    Past Medical History:   Diagnosis Date    Back pain     Dr. 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   HENT: Negative for hearing loss and tinnitus.    Eyes: Negative for visual disturbance.   Respiratory: Negative for cough, chest tightness, shortness of breath and wheezing.    Cardiovascular: Negative for chest pain, palpitations and leg swelling.   Gastrointestinal: Negative for abdominal distention, abdominal pain, blood in stool, constipation and diarrhea.   Genitourinary: Negative for difficulty urinating, discharge and testicular pain.   Musculoskeletal: " Negative for arthralgias and back pain.   Skin: Negative.    Neurological: Negative for dizziness, weakness and headaches.   Psychiatric/Behavioral: Negative.      Objective:     Physical Exam   Constitutional: He is oriented to person, place, and time. He appears well-developed and well-nourished. No distress.   HENT:   Right Ear: External ear normal.   Left Ear: External ear normal.   Nose: Nose normal.   Mouth/Throat: Oropharynx is clear and moist.   Eyes: Pupils are equal, round, and reactive to light.   Neck: Normal range of motion. Neck supple.   Cardiovascular: Normal rate, regular rhythm and normal heart sounds.    Pulmonary/Chest: Effort normal and breath sounds normal. No respiratory distress. He has no wheezes.   Abdominal: Soft. Bowel sounds are normal. He exhibits no distension. There is no tenderness.   Musculoskeletal: He exhibits no edema.   Neurological: He is alert and oriented to person, place, and time. No cranial nerve deficit.   Skin: Skin is warm and dry. He is not diaphoretic.   Psychiatric: He has a normal mood and affect. His behavior is normal. Judgment and thought content normal.   Nursing note and vitals reviewed.      Lab Results   Component Value Date    WBC 5.48 07/28/2017    HGB 14.0 07/28/2017    HCT 39.4 (L) 07/28/2017     07/28/2017    CHOL 171 01/05/2016    TRIG 68 01/05/2016    HDL 64 01/05/2016    ALT 16 07/28/2017    AST 18 07/28/2017     07/28/2017    K 4.2 07/28/2017     07/28/2017    CREATININE 1.4 07/28/2017    BUN 18 07/28/2017    CO2 26 07/28/2017    TSH 0.865 07/28/2017    PSA 1.8 07/28/2017    HGBA1C 5.3 07/28/2017       Assessment:     1. Routine general medical examination at a health care facility       Plan:   Routine general medical examination at a health care facility    Other orders  -     lisinopril-hydrochlorothiazide (PRINZIDE,ZESTORETIC) 20-12.5 mg per tablet; Take 1 tablet by mouth once daily.  Dispense: 90 tablet; Refill: 4    Reviewed  labs, and ua.  Look stable overall  BP well controlled, cont current med  Cont with healthy diet/exercise  F/U annually and PRN

## 2017-11-30 ENCOUNTER — OFFICE VISIT (OUTPATIENT)
Dept: INTERNAL MEDICINE | Facility: CLINIC | Age: 47
End: 2017-11-30
Payer: COMMERCIAL

## 2017-11-30 VITALS
TEMPERATURE: 98 F | HEIGHT: 74 IN | HEART RATE: 70 BPM | BODY MASS INDEX: 31.04 KG/M2 | DIASTOLIC BLOOD PRESSURE: 100 MMHG | WEIGHT: 241.88 LBS | SYSTOLIC BLOOD PRESSURE: 130 MMHG

## 2017-11-30 DIAGNOSIS — R09.82 PND (POST-NASAL DRIP): ICD-10-CM

## 2017-11-30 DIAGNOSIS — R05.9 COUGH: Primary | ICD-10-CM

## 2017-11-30 PROCEDURE — 99999 PR PBB SHADOW E&M-EST. PATIENT-LVL III: CPT | Mod: PBBFAC,,, | Performed by: FAMILY MEDICINE

## 2017-11-30 PROCEDURE — 99213 OFFICE O/P EST LOW 20 MIN: CPT | Mod: S$GLB,,, | Performed by: FAMILY MEDICINE

## 2017-11-30 RX ORDER — PROMETHAZINE HYDROCHLORIDE AND DEXTROMETHORPHAN HYDROBROMIDE 6.25; 15 MG/5ML; MG/5ML
5 SYRUP ORAL NIGHTLY PRN
Qty: 100 ML | Refills: 0 | Status: SHIPPED | OUTPATIENT
Start: 2017-11-30 | End: 2017-12-10

## 2017-11-30 RX ORDER — BETAMETHASONE VALERATE 1.2 MG/G
AEROSOL, FOAM TOPICAL 2 TIMES DAILY
Refills: 2 | COMMUNITY
Start: 2017-11-02

## 2017-11-30 RX ORDER — LISINOPRIL AND HYDROCHLOROTHIAZIDE 12.5; 2 MG/1; MG/1
1 TABLET ORAL DAILY
COMMUNITY
Start: 2017-10-14 | End: 2017-11-30 | Stop reason: SDUPTHER

## 2017-11-30 RX ORDER — DOXYCYCLINE 100 MG/1
1 CAPSULE ORAL 2 TIMES DAILY
Refills: 0 | COMMUNITY
Start: 2017-11-14 | End: 2018-05-14 | Stop reason: ALTCHOICE

## 2017-11-30 NOTE — PROGRESS NOTES
"Subjective:      Patient ID: Jimi Clark is a 46 y.o. male.    Chief Complaint: Cough    HPI  47 yo male with HTN here with c/o cough for past 4 days.  Son had it first, then he and his mother got it  No fever/chills.  Cough worse at bedtime.  Today with some mucous production.  Some rhinorrhea and sore throat.  Denies wheezing.  Using coricidan and nyquil.  On doxy/finishing up from a sinus infection.  Saw outside ENT earlier this month, had decadron shot and ABx.      Past Medical History:   Diagnosis Date    Back pain     Dr. 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   HENT: Positive for postnasal drip, rhinorrhea and sore throat.    Respiratory: Positive for cough. Negative for shortness of breath and wheezing.    Gastrointestinal:        Stomach hurts from coughing so much     Objective:     Physical Exam   Constitutional: He appears well-developed and well-nourished.   HENT:   Right Ear: External ear normal.   Left Ear: External ear normal.   Nasal turbinates swollen   Eyes: Pupils are equal, round, and reactive to light.   Neck: Normal range of motion. Neck supple. "   Cardiovascular: Normal rate, regular rhythm and normal heart sounds.    Pulmonary/Chest: Effort normal and breath sounds normal. No respiratory distress. He has no wheezes. He has no rales.   Nursing note and vitals reviewed.      Lab Results   Component Value Date    WBC 5.48 07/28/2017    HGB 14.0 07/28/2017    HCT 39.4 (L) 07/28/2017     07/28/2017    CHOL 171 01/05/2016    TRIG 68 01/05/2016    HDL 64 01/05/2016    ALT 16 07/28/2017    AST 18 07/28/2017     07/28/2017    K 4.2 07/28/2017     07/28/2017    CREATININE 1.4 07/28/2017    BUN 18 07/28/2017    CO2 26 07/28/2017    TSH 0.865 07/28/2017    PSA 1.8 07/28/2017    HGBA1C 5.3 07/28/2017       Assessment:     1. Cough    2. PND (post-nasal drip)       Plan:   Cough    PND (post-nasal drip)    Other orders  -     promethazine-dextromethorphan (PROMETHAZINE-DM) 6.25-15 mg/5 mL Syrp; Take 5 mLs by mouth nightly as needed.  Dispense: 100 mL; Refill: 0    Phenergan DM syrup for bedtime  Can use antihistamine.  Use nasal spray daily  Avoid decongestants, no BP med yet today  F/u PRN

## 2018-01-15 RX ORDER — LISINOPRIL AND HYDROCHLOROTHIAZIDE 12.5; 2 MG/1; MG/1
1 TABLET ORAL DAILY
Qty: 90 TABLET | Refills: 4 | Status: SHIPPED | OUTPATIENT
Start: 2018-01-15 | End: 2019-06-27 | Stop reason: SDUPTHER

## 2018-05-10 DIAGNOSIS — Z00.00 ROUTINE GENERAL MEDICAL EXAMINATION AT A HEALTH CARE FACILITY: Primary | ICD-10-CM

## 2018-05-14 ENCOUNTER — CLINICAL SUPPORT (OUTPATIENT)
Dept: INTERNAL MEDICINE | Facility: CLINIC | Age: 48
End: 2018-05-14
Payer: COMMERCIAL

## 2018-05-14 ENCOUNTER — CLINICAL SUPPORT (OUTPATIENT)
Dept: CARDIOLOGY | Facility: CLINIC | Age: 48
End: 2018-05-14
Payer: COMMERCIAL

## 2018-05-14 ENCOUNTER — OFFICE VISIT (OUTPATIENT)
Dept: INTERNAL MEDICINE | Facility: CLINIC | Age: 48
End: 2018-05-14
Payer: COMMERCIAL

## 2018-05-14 VITALS
BODY MASS INDEX: 30.9 KG/M2 | RESPIRATION RATE: 16 BRPM | DIASTOLIC BLOOD PRESSURE: 78 MMHG | WEIGHT: 240.75 LBS | HEART RATE: 60 BPM | SYSTOLIC BLOOD PRESSURE: 122 MMHG | HEIGHT: 74 IN

## 2018-05-14 VITALS
SYSTOLIC BLOOD PRESSURE: 122 MMHG | BODY MASS INDEX: 30.9 KG/M2 | DIASTOLIC BLOOD PRESSURE: 78 MMHG | WEIGHT: 240.75 LBS | HEIGHT: 74 IN | HEART RATE: 60 BPM | TEMPERATURE: 98 F

## 2018-05-14 DIAGNOSIS — M54.9 BACK PAIN, UNSPECIFIED BACK LOCATION, UNSPECIFIED BACK PAIN LATERALITY, UNSPECIFIED CHRONICITY: ICD-10-CM

## 2018-05-14 DIAGNOSIS — Z00.00 ROUTINE GENERAL MEDICAL EXAMINATION AT A HEALTH CARE FACILITY: Primary | ICD-10-CM

## 2018-05-14 DIAGNOSIS — Z00.00 ANNUAL PHYSICAL EXAM: ICD-10-CM

## 2018-05-14 DIAGNOSIS — Z00.00 ROUTINE GENERAL MEDICAL EXAMINATION AT A HEALTH CARE FACILITY: ICD-10-CM

## 2018-05-14 DIAGNOSIS — I10 ESSENTIAL HYPERTENSION: Primary | ICD-10-CM

## 2018-05-14 LAB
ALBUMIN SERPL BCP-MCNC: 4 G/DL
ALP SERPL-CCNC: 50 U/L
ALT SERPL W/O P-5'-P-CCNC: 15 U/L
ANION GAP SERPL CALC-SCNC: 8 MMOL/L
AST SERPL-CCNC: 18 U/L
BILIRUB SERPL-MCNC: 0.6 MG/DL
BUN SERPL-MCNC: 14 MG/DL
CALCIUM SERPL-MCNC: 9.4 MG/DL
CHLORIDE SERPL-SCNC: 106 MMOL/L
CHOLEST SERPL-MCNC: 149 MG/DL
CHOLEST/HDLC SERPL: 3 {RATIO}
CO2 SERPL-SCNC: 27 MMOL/L
COMPLEXED PSA SERPL-MCNC: 2.1 NG/ML
CREAT SERPL-MCNC: 1.4 MG/DL
ERYTHROCYTE [DISTWIDTH] IN BLOOD BY AUTOMATED COUNT: 13.2 %
EST. GFR  (AFRICAN AMERICAN): >60 ML/MIN/1.73 M^2
EST. GFR  (NON AFRICAN AMERICAN): >60 ML/MIN/1.73 M^2
ESTIMATED AVG GLUCOSE: 105 MG/DL
GLUCOSE SERPL-MCNC: 98 MG/DL
HBA1C MFR BLD HPLC: 5.3 %
HCT VFR BLD AUTO: 40.1 %
HDLC SERPL-MCNC: 49 MG/DL
HDLC SERPL: 32.9 %
HGB BLD-MCNC: 13.7 G/DL
LDLC SERPL CALC-MCNC: 86.6 MG/DL
MCH RBC QN AUTO: 28.5 PG
MCHC RBC AUTO-ENTMCNC: 34.2 G/DL
MCV RBC AUTO: 84 FL
NONHDLC SERPL-MCNC: 100 MG/DL
PLATELET # BLD AUTO: 163 K/UL
PMV BLD AUTO: 10.7 FL
POTASSIUM SERPL-SCNC: 3.9 MMOL/L
PROT SERPL-MCNC: 6.7 G/DL
RBC # BLD AUTO: 4.8 M/UL
SODIUM SERPL-SCNC: 141 MMOL/L
TRIGL SERPL-MCNC: 67 MG/DL
TSH SERPL DL<=0.005 MIU/L-ACNC: 1.34 UIU/ML
WBC # BLD AUTO: 5.72 K/UL

## 2018-05-14 PROCEDURE — 84153 ASSAY OF PSA TOTAL: CPT

## 2018-05-14 PROCEDURE — 83036 HEMOGLOBIN GLYCOSYLATED A1C: CPT

## 2018-05-14 PROCEDURE — 85027 COMPLETE CBC AUTOMATED: CPT | Mod: PO

## 2018-05-14 PROCEDURE — 3074F SYST BP LT 130 MM HG: CPT | Mod: CPTII,S$GLB,, | Performed by: FAMILY MEDICINE

## 2018-05-14 PROCEDURE — 3078F DIAST BP <80 MM HG: CPT | Mod: CPTII,S$GLB,, | Performed by: FAMILY MEDICINE

## 2018-05-14 PROCEDURE — 99396 PREV VISIT EST AGE 40-64: CPT | Mod: S$GLB,,, | Performed by: FAMILY MEDICINE

## 2018-05-14 PROCEDURE — 80053 COMPREHEN METABOLIC PANEL: CPT | Mod: PO

## 2018-05-14 PROCEDURE — 80061 LIPID PANEL: CPT | Mod: PO

## 2018-05-14 PROCEDURE — 93000 ELECTROCARDIOGRAM COMPLETE: CPT | Mod: S$GLB,,, | Performed by: INTERNAL MEDICINE

## 2018-05-14 PROCEDURE — 84443 ASSAY THYROID STIM HORMONE: CPT

## 2018-05-14 PROCEDURE — 99999 PR PBB SHADOW E&M-EST. PATIENT-LVL III: CPT | Mod: PBBFAC,,, | Performed by: FAMILY MEDICINE

## 2018-05-14 RX ORDER — CYCLOBENZAPRINE HCL 10 MG
10 TABLET ORAL NIGHTLY
Qty: 15 TABLET | Refills: 0 | Status: SHIPPED | OUTPATIENT
Start: 2018-05-14 | End: 2018-05-24

## 2018-05-14 NOTE — PROGRESS NOTES
Subjective:       Patient ID: Jimi Clark is a 47 y.o. male.    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 Pt does have HTN and is well controlled today. Pt is describing some back pain that just started. No ongoing issue. No history of trauma.      Review of Systems   Constitutional: Negative.    HENT: Negative.    Respiratory: Negative.    Cardiovascular: Negative.    Gastrointestinal: Negative.    Skin: Negative.    Neurological: Negative.    Psychiatric/Behavioral: Negative.        Objective:      Physical Exam   Constitutional: He is oriented to person, place, and time. He appears well-developed and well-nourished.   HENT:   Head: Normocephalic.   Eyes: EOM are normal. Pupils are equal, round, and reactive to light.   Neck: Normal range of motion. Neck supple. No JVD present. No thyromegaly present.   Cardiovascular: Normal rate and regular rhythm.    Pulmonary/Chest: Effort normal and breath sounds normal. He has no wheezes.   Abdominal: Soft. Bowel sounds are normal. He exhibits no mass. There is no guarding.   Musculoskeletal: Normal range of motion.   Lymphadenopathy:     He has no cervical adenopathy.   Neurological: He is alert and oriented to person, place, and time. He has normal reflexes. He displays normal reflexes. Coordination normal.   Skin: Skin is warm and dry.   Psychiatric: He has a normal mood and affect. His behavior is normal.       Assessment:       1. Essential hypertension    2. Annual physical exam        Plan:       Essential hypertension  Comments:  BP is well controlled    Annual physical exam  Comments:  Pt is over good health    Other orders  -     cyclobenzaprine (FLEXERIL) 10 MG tablet; Take 1 tablet (10 mg total) by mouth every evening.  Dispense: 15 tablet; Refill: 0

## 2018-05-17 ENCOUNTER — OFFICE VISIT (OUTPATIENT)
Dept: INTERNAL MEDICINE | Facility: CLINIC | Age: 48
End: 2018-05-17
Payer: COMMERCIAL

## 2018-05-17 VITALS
TEMPERATURE: 98 F | HEIGHT: 75 IN | BODY MASS INDEX: 30.67 KG/M2 | DIASTOLIC BLOOD PRESSURE: 88 MMHG | HEART RATE: 60 BPM | SYSTOLIC BLOOD PRESSURE: 140 MMHG | WEIGHT: 246.69 LBS

## 2018-05-17 DIAGNOSIS — I10 ESSENTIAL HYPERTENSION: Primary | ICD-10-CM

## 2018-05-17 DIAGNOSIS — R10.9 ABDOMINAL CRAMPING: ICD-10-CM

## 2018-05-17 PROCEDURE — 3077F SYST BP >= 140 MM HG: CPT | Mod: CPTII,S$GLB,, | Performed by: PHYSICIAN ASSISTANT

## 2018-05-17 PROCEDURE — 99214 OFFICE O/P EST MOD 30 MIN: CPT | Mod: S$GLB,,, | Performed by: PHYSICIAN ASSISTANT

## 2018-05-17 PROCEDURE — 99999 PR PBB SHADOW E&M-EST. PATIENT-LVL III: CPT | Mod: PBBFAC,,, | Performed by: PHYSICIAN ASSISTANT

## 2018-05-17 PROCEDURE — 3008F BODY MASS INDEX DOCD: CPT | Mod: CPTII,S$GLB,, | Performed by: PHYSICIAN ASSISTANT

## 2018-05-17 PROCEDURE — 3079F DIAST BP 80-89 MM HG: CPT | Mod: CPTII,S$GLB,, | Performed by: PHYSICIAN ASSISTANT

## 2018-05-17 RX ORDER — POLYETHYLENE GLYCOL 3350 17 G/17G
POWDER, FOR SOLUTION ORAL
Qty: 10 EACH | Refills: 0 | Status: SHIPPED | OUTPATIENT
Start: 2018-05-17

## 2018-05-17 NOTE — PROGRESS NOTES
Subjective:       Patient ID: Jimi Clark is a 47 y.o. male.    Chief Complaint: Abdominal Pain and Follow-up (ER )  Patient went to ER at 2am this AM, was discharged at 5am today, just a few hours ago.   Went to ER due to abdominal pain, right sided.   CT done, labs and urine were all unremarkable. Except CT + stool in abdomen.   Told to take mag cit irate and follow up with PCP   He came straight here from ER, did not take medication yet. States he was given morphine and tramadol in ER     No pain at this time   Last BM yesterday, states it was normal   Has on/off constipation   Recently has been more sedentary and stressed. No f/c/ns   Abdominal Pain   This is a new problem. The current episode started in the past 7 days. Associated symptoms include constipation. Pertinent negatives include no arthralgias, diarrhea, fever, myalgias or nausea.     ER note in care everywhere   There are no preventive care reminders to display for this patient.    Past Medical History:   Diagnosis Date    Back pain     Dr. Arun Ferreira    HTN (hypertension)     Seasonal allergies        Current Outpatient Prescriptions   Medication Sig Dispense Refill    betamethasone valerate (LUXIQ) 0.12 % foam 2 (two) times daily.  2    cyclobenzaprine (FLEXERIL) 10 MG tablet Take 1 tablet (10 mg total) by mouth every evening. 15 tablet 0    desoximetasone 0.05 % Oint ELODIA TO CHEST BID  1    lisinopril-hydrochlorothiazide (PRINZIDE,ZESTORETIC) 20-12.5 mg per tablet Take 1 tablet by mouth once daily. 90 tablet 4    tramadol (ULTRAM) 50 mg tablet Take 1 tablet by mouth every 3 (three) hours.  0    azelastine-fluticasone 137-50 mcg/spray [place] 1 spray by Nasal route.      polyethylene glycol (GLYCOLAX) 17 gram PwPk 1 pack every other day for a couple of weeks to regulate bowels 10 each 0     No current facility-administered medications for this visit.        Review of Systems   Constitutional: Negative for diaphoresis, fatigue, fever  "and unexpected weight change.   Eyes: Negative for photophobia, redness and visual disturbance.   Respiratory: Negative for cough and shortness of breath.    Cardiovascular: Negative for chest pain, palpitations and leg swelling.   Gastrointestinal: Positive for abdominal pain and constipation. Negative for diarrhea, nausea and rectal pain.   Endocrine: Negative for polydipsia and polyuria.   Genitourinary: Negative for decreased urine volume, scrotal swelling, testicular pain and urgency.   Musculoskeletal: Positive for neck pain. Negative for arthralgias, back pain, gait problem, joint swelling and myalgias.   Neurological: Negative for tremors, syncope, speech difficulty and weakness.   Hematological: Negative for adenopathy. Does not bruise/bleed easily.       Objective:   BP (!) 140/88   Pulse 60   Temp 97.7 °F (36.5 °C) (Tympanic)   Ht 6' 2.5" (1.892 m)   Wt 111.9 kg (246 lb 11.1 oz)   BMI 31.25 kg/m²      Physical Exam   Constitutional: He is oriented to person, place, and time. He appears well-developed and well-nourished. No distress.   HENT:   Head: Normocephalic and atraumatic.   Right Ear: External ear normal.   Left Ear: External ear normal.   Nose: Nose normal.   Mouth/Throat: Oropharynx is clear and moist. No oropharyngeal exudate.   TMs normal    Eyes: Conjunctivae and EOM are normal. Pupils are equal, round, and reactive to light.   Neck: Normal range of motion. Neck supple.   Cardiovascular: Normal rate, regular rhythm and normal heart sounds.    Pulmonary/Chest: Effort normal and breath sounds normal.   Abdominal: Soft. Normal appearance and bowel sounds are normal. There is no hepatosplenomegaly. There is no tenderness.   Genitourinary:   Genitourinary Comments: Exam not done    Musculoskeletal: Normal range of motion.   Neurological: He is alert and oriented to person, place, and time. He has normal reflexes.   Skin: Skin is warm.   Psychiatric: He has a normal mood and affect. His behavior " is normal. Judgment and thought content normal.   Vitals reviewed.        Lab Results   Component Value Date    WBC 5.72 05/14/2018    HGB 13.7 (L) 05/14/2018    HCT 40.1 05/14/2018     05/14/2018    CHOL 149 05/14/2018    TRIG 67 05/14/2018    HDL 49 05/14/2018    ALT 15 05/14/2018    AST 18 05/14/2018     05/14/2018    K 3.9 05/14/2018     05/14/2018    CREATININE 1.4 05/14/2018    BUN 14 05/14/2018    CO2 27 05/14/2018    TSH 1.337 05/14/2018    PSA 2.1 05/14/2018    HGBA1C 5.3 05/14/2018       Assessment:       1. Essential hypertension    2. Abdominal cramping        Plan:   Essential hypertension  BP elevated but he has not slept all night     Abdominal cramping  CT showed stool collection but otherwise no abnormalities   Other orders  -     polyethylene glycol (GLYCOLAX) 17 gram PwPk; 1 pack every other day for a couple of weeks to regulate bowels  Dispense: 10 each; Refill: 0      Discussed agree to clean out with mag citrate  Start high fiber diet, drink pleanty of water   Use miralax starting in a few days to regulate bowels   Follow up with PCP or GI if no improve mentwith above treatment plan   No Follow-up on file.

## 2018-05-18 ENCOUNTER — PATIENT MESSAGE (OUTPATIENT)
Dept: INTERNAL MEDICINE | Facility: CLINIC | Age: 48
End: 2018-05-18

## 2018-05-21 PROBLEM — Z00.00 ANNUAL PHYSICAL EXAM: Status: ACTIVE | Noted: 2018-05-21

## 2018-05-21 PROBLEM — M54.9 BACK PAIN: Status: ACTIVE | Noted: 2018-05-21

## 2018-05-21 NOTE — TELEPHONE ENCOUNTER
Pt has been scheduled for GI appt on 5/23/18 and is aware of date, time and location of appointment.

## 2018-05-23 ENCOUNTER — INITIAL CONSULT (OUTPATIENT)
Dept: GASTROENTEROLOGY | Facility: CLINIC | Age: 48
End: 2018-05-23
Payer: COMMERCIAL

## 2018-05-23 VITALS
DIASTOLIC BLOOD PRESSURE: 90 MMHG | WEIGHT: 242.94 LBS | HEIGHT: 75 IN | HEART RATE: 90 BPM | BODY MASS INDEX: 30.21 KG/M2 | SYSTOLIC BLOOD PRESSURE: 140 MMHG

## 2018-05-23 DIAGNOSIS — R10.9 RIGHT SIDED ABDOMINAL PAIN: Primary | ICD-10-CM

## 2018-05-23 DIAGNOSIS — R10.9 RIGHT FLANK PAIN: ICD-10-CM

## 2018-05-23 PROCEDURE — 3080F DIAST BP >= 90 MM HG: CPT | Mod: CPTII,S$GLB,, | Performed by: NURSE PRACTITIONER

## 2018-05-23 PROCEDURE — 99203 OFFICE O/P NEW LOW 30 MIN: CPT | Mod: SA,S$GLB,, | Performed by: NURSE PRACTITIONER

## 2018-05-23 PROCEDURE — 3077F SYST BP >= 140 MM HG: CPT | Mod: CPTII,S$GLB,, | Performed by: NURSE PRACTITIONER

## 2018-05-23 PROCEDURE — 99999 PR PBB SHADOW E&M-EST. PATIENT-LVL III: CPT | Mod: PBBFAC,,, | Performed by: NURSE PRACTITIONER

## 2018-05-23 PROCEDURE — 3008F BODY MASS INDEX DOCD: CPT | Mod: CPTII,S$GLB,, | Performed by: NURSE PRACTITIONER

## 2018-05-23 NOTE — PROGRESS NOTES
"Clinic Consult:  [facility] Consultation Note    Reason for Consult:  The primary encounter diagnosis was Right sided abdominal pain. A diagnosis of Right flank pain was also pertinent to this visit.    PCP: Mack Beebe   [address] / [place] [address]    HPI:  This is a 47 y.o. male here for evaluation of the the above. He was referred to me by KAIT Calvert. He presents to clinic with persistent right flank and abdominal pain. Onset of symptoms began gradually 3 weeks ago. He reports pain starts in his right flank area and radiates to his RUQ and then down to his RLQ. This pain worsens with certain movements such as position changes. Pain is better when laying still and not moving. There  Is no relationship to eating. He went to the ER for these symptoms and had workup with CT scan. Unrevealing for anything except for constipation. He feels as if there is a band around his side and describes the pain as a "pulling" pain    Review of Systems   Constitutional: Negative for fever, malaise/fatigue and weight loss.   HENT: Negative for sore throat.    Respiratory: Negative for cough and wheezing.    Cardiovascular: Negative for chest pain and palpitations.   Gastrointestinal: Positive for abdominal pain and constipation. Negative for blood in stool, diarrhea, heartburn, melena, nausea and vomiting.   Genitourinary: Negative for dysuria and frequency.   Musculoskeletal: Negative for back pain, joint pain, myalgias and neck pain.   Skin: Negative for itching and rash.   Neurological: Negative for dizziness, speech change, seizures, loss of consciousness and headaches.   Psychiatric/Behavioral: Negative for depression and substance abuse. The patient is not nervous/anxious.        Medical History:  has a past medical history of Back pain; HTN (hypertension); and Seasonal allergies.    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    Family History: family history includes Alcohol abuse in his mother; Cancer in his maternal grandfather; Heart attack (age of onset: 59) in his maternal aunt; Heart disease in his father, maternal aunt, maternal grandmother, and mother; Stroke in his father and mother..     Social History:  reports that he has never smoked. He has never used smokeless tobacco. He reports that he does not drink alcohol or use drugs.    Allergies: Reviewed    Home Medications:   Current Outpatient Prescriptions on File Prior to Visit   Medication Sig Dispense Refill    azelastine-fluticasone 137-50 mcg/spray [place] 1 spray by Nasal route.      betamethasone valerate (LUXIQ) 0.12 % foam 2 (two) times daily.  2    cyclobenzaprine (FLEXERIL) 10 MG tablet Take 1 tablet (10 mg total) by mouth every evening. 15 tablet 0    lisinopril-hydrochlorothiazide (PRINZIDE,ZESTORETIC) 20-12.5 mg per tablet Take 1 tablet by mouth once daily. 90 tablet 4    polyethylene glycol (GLYCOLAX) 17 gram PwPk 1 pack every other day for a couple of weeks to regulate bowels 10 each 0    tramadol (ULTRAM) 50 mg tablet Take 1 tablet by mouth every 3 (three) hours.  0    desoximetasone 0.05 % Oint ELODIA TO CHEST BID  1     No current facility-administered medications on file prior to visit.        Physical Exam:  BP (!) 140/90   Pulse 90   Ht 6' 2.5" (1.892 m)   Wt 110.2 kg (242 lb 15.2 oz)   BMI 30.78 kg/m²   Body mass index is 30.78 kg/m².  Physical Exam   Constitutional: He is oriented to person, place, and time and well-developed, well-nourished, and in no distress. No distress.   HENT:   Head: Normocephalic.   Eyes: Conjunctivae are normal. Pupils are equal, round, and reactive to light.   Cardiovascular: Normal rate, regular rhythm and normal heart sounds.    Pulmonary/Chest: Effort normal and breath sounds normal. No respiratory distress.   Abdominal: Soft. Bowel sounds are normal. He exhibits no " distension. There is no tenderness.   Neurological: He is alert and oriented to person, place, and time. No cranial nerve deficit.   Skin: Skin is warm and dry. No rash noted.   Psychiatric: Mood and affect normal.       Labs: Pertinent labs reviewed.    Assessment:  1. Right sided abdominal pain    2. Right flank pain         Recommendations:  - I feel as though his pain is not GI related  - pain seems more consistent with musculoskeletal issue  - I do not feel as though additional GI workup for symptoms would be of any benefit. Can consider if etiology of pain remains unclear. If repeat CT scan is needed would recommend to do with both PO and IV contrast.   - would recommend using NSAIDs along with Zantac to protect his stomach.  - he is also of age to start his screening colonoscopies. This was discussed with patient but recommended to wait until acute issues subside.     Follow-up if symptoms worsen or fail to improve.    Thank you so much for allowing me to participate in the care of PARUL Gómez

## 2018-05-23 NOTE — LETTER
May 24, 2018      KAIT Cassidy  [address] [place] [address]           [facility]  [address] [place] [address]  Phone: [phone]  Fax: [phone]          Patient: Jimi Clark   MR Number: [id number]   YOB: 1970   Date of Visit: 5/23/2018       Dear Lilibeth Hemphill:    Thank you for referring Jimi Clark to me for evaluation. Attached you will find relevant portions of my assessment and plan of care.    If you have questions, please do not hesitate to call me. I look forward to following Jimi Clark along with you.    Sincerely,    Meredith Valerio, ISAI    Enclosure  CC:  No Recipients    If you would like to receive this communication electronically, please contact [email or web address] or [phone] to request more information on [facility] Link access.    For providers and/or their staff who would like to refer a patient to [facility], please contact us through our one-stop-shop provider referral line, [facility], at [phone].    If you feel you have received this communication in error or would no longer like to receive these types of communications, please e-mail [email or web address]

## 2018-05-24 ENCOUNTER — PATIENT MESSAGE (OUTPATIENT)
Dept: GASTROENTEROLOGY | Facility: CLINIC | Age: 48
End: 2018-05-24

## 2018-05-24 NOTE — TELEPHONE ENCOUNTER
No. Ibuprofen in over the counter. Other names are Motrin and Advil. He should take 3 tablets three times a day. Each tablet is 200mg. The Zantac is also over the counter.

## 2018-05-29 ENCOUNTER — TELEPHONE (OUTPATIENT)
Dept: INTERNAL MEDICINE | Facility: CLINIC | Age: 48
End: 2018-05-29

## 2018-05-29 NOTE — TELEPHONE ENCOUNTER
Pt complains of rt side pain for about 2 weeks. He went to [facility] and told his was probably impacted. Pt states, he hasn't had any problems with bowel movements. The pain is worse with movement. Denies fever, nausea/vomiting. He has been seen by Lilibeth and Meredith Valerio in Gastro. He was advised by Ms. Valerio to f/u with pcp. The first available appt is 6/5. Pt wants to know if he can be seen sooner? He prefers to see his pcp.

## 2018-05-29 NOTE — TELEPHONE ENCOUNTER
----- Message from Irwin Braun sent at 5/29/2018  8:58 AM CDT -----  Contact: [phone]   Pt would like to consult with the nurse about pain.  Please call back at [phone].  Thx-AH

## 2018-05-29 NOTE — TELEPHONE ENCOUNTER
Left message for call back on voice mail. Dr. James has an appt for tomorrow at 3:20. appt scheduled. Will find out if pt would like to keep this appt. Message sent to pt through the portal also

## 2018-05-31 ENCOUNTER — PATIENT MESSAGE (OUTPATIENT)
Dept: UROLOGY | Facility: CLINIC | Age: 48
End: 2018-05-31

## 2018-06-18 ENCOUNTER — PATIENT MESSAGE (OUTPATIENT)
Dept: GASTROENTEROLOGY | Facility: CLINIC | Age: 48
End: 2018-06-18

## 2018-06-20 ENCOUNTER — OFFICE VISIT (OUTPATIENT)
Dept: GASTROENTEROLOGY | Facility: CLINIC | Age: 48
End: 2018-06-20
Payer: COMMERCIAL

## 2018-06-20 ENCOUNTER — HOSPITAL ENCOUNTER (OUTPATIENT)
Dept: RADIOLOGY | Facility: HOSPITAL | Age: 48
Discharge: HOME | End: 2018-06-20
Attending: NURSE PRACTITIONER
Payer: COMMERCIAL

## 2018-06-20 VITALS
HEART RATE: 64 BPM | WEIGHT: 241.19 LBS | DIASTOLIC BLOOD PRESSURE: 72 MMHG | SYSTOLIC BLOOD PRESSURE: 130 MMHG | HEIGHT: 74 IN | BODY MASS INDEX: 30.95 KG/M2

## 2018-06-20 DIAGNOSIS — R10.11 RUQ ABDOMINAL PAIN: ICD-10-CM

## 2018-06-20 DIAGNOSIS — R19.4 CHANGE IN BOWEL HABIT: ICD-10-CM

## 2018-06-20 DIAGNOSIS — R10.31 RIGHT LOWER QUADRANT ABDOMINAL PAIN: ICD-10-CM

## 2018-06-20 DIAGNOSIS — R10.9 RIGHT FLANK PAIN: ICD-10-CM

## 2018-06-20 DIAGNOSIS — R19.4 CHANGE IN BOWEL HABIT: Primary | ICD-10-CM

## 2018-06-20 PROCEDURE — 3075F SYST BP GE 130 - 139MM HG: CPT | Mod: CPTII,S$GLB,, | Performed by: NURSE PRACTITIONER

## 2018-06-20 PROCEDURE — 3078F DIAST BP <80 MM HG: CPT | Mod: CPTII,S$GLB,, | Performed by: NURSE PRACTITIONER

## 2018-06-20 PROCEDURE — 74019 RADEX ABDOMEN 2 VIEWS: CPT | Mod: TC

## 2018-06-20 PROCEDURE — 99213 OFFICE O/P EST LOW 20 MIN: CPT | Mod: S$GLB,,, | Performed by: NURSE PRACTITIONER

## 2018-06-20 PROCEDURE — 74019 RADEX ABDOMEN 2 VIEWS: CPT | Mod: 26,,, | Performed by: RADIOLOGY

## 2018-06-20 PROCEDURE — 3008F BODY MASS INDEX DOCD: CPT | Mod: CPTII,S$GLB,, | Performed by: NURSE PRACTITIONER

## 2018-06-20 PROCEDURE — 99999 PR PBB SHADOW E&M-EST. PATIENT-LVL III: CPT | Mod: PBBFAC,,, | Performed by: NURSE PRACTITIONER

## 2018-06-20 RX ORDER — SODIUM, POTASSIUM,MAG SULFATES 17.5-3.13G
SOLUTION, RECONSTITUTED, ORAL ORAL
Qty: 354 ML | Refills: 0 | Status: ON HOLD | OUTPATIENT
Start: 2018-06-20 | End: 2018-08-07 | Stop reason: CLARIF

## 2018-06-20 NOTE — PROGRESS NOTES
Clinic Follow Up:  [facility] Follow Up Note    Reason for Follow Up:  The primary encounter diagnosis was Change in bowel habit. Diagnoses of Right flank pain, RUQ abdominal pain, and Lower abdominal pain were also pertinent to this visit.    PCP: Mack Beebe       HPI:  This is a 47 y.o. male here for follow up of the above. He continues with symptoms. Before his initial consult, he was seen at [facility] and had a non-contrast CT scan and labs which was unrevealing. His pain sometimes starts in the back and radiates front. Sometimes pain starts in the abdomen on the right side only. There is no relationship to eating or timing of the day. Pain is triggered by movement such as bending over to tie his shoes and getting out of the car. Other exacerbating movements include coughing and deep breathing. He was diagnosed with constipation when he was seen at the ER. He has been taking Miralax but reports stools are inconsistent and have been changing. He goes from hard formed stools to loose stools. No hematochezia or melena. He has not followed up with Dr. Tyrel Beebe as previously recommended.     Review of Systems   Constitutional: Negative for activity change and appetite change.        As per interval history above   Respiratory: Negative for cough and shortness of breath.    Cardiovascular: Negative for chest pain.   Gastrointestinal: Positive for abdominal pain, constipation and diarrhea. Negative for blood in stool, nausea and vomiting.   Genitourinary: Positive for flank pain.   Skin: Negative for color change and rash.       Medical History:  Past Medical History:   Diagnosis Date    Back pain     Dr. 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      TONSILLECTOMY      WISDOM TOOTH EXTRACTION         Family History:  "  Family History   Problem Relation Age of Onset    Alcohol abuse Mother     Stroke Mother     Heart disease Mother     Heart disease Father     Stroke Father     Heart disease Maternal Aunt     Heart attack Maternal Aunt 59    Heart disease Maternal Grandmother     Cancer Maternal Grandfather         prostate       Social History:   Social History   Substance Use Topics    Smoking status: Never Smoker    Smokeless tobacco: Never Used    Alcohol use No       Allergies: Review of patient's allergies indicates:  No Known Allergies    Home Medications:  Current Outpatient Prescriptions on File Prior to Visit   Medication Sig Dispense Refill    betamethasone valerate (LUXIQ) 0.12 % foam 2 (two) times daily.  2    desoximetasone 0.05 % Oint ELODIA TO CHEST BID  1    lisinopril-hydrochlorothiazide (PRINZIDE,ZESTORETIC) 20-12.5 mg per tablet Take 1 tablet by mouth once daily. 90 tablet 4    tramadol (ULTRAM) 50 mg tablet Take 1 tablet by mouth every 3 (three) hours.  0    azelastine-fluticasone 137-50 mcg/spray [place] 1 spray by Nasal route.      polyethylene glycol (GLYCOLAX) 17 gram PwPk 1 pack every other day for a couple of weeks to regulate bowels 10 each 0     No current facility-administered medications on file prior to visit.        /72   Pulse 64   Ht 6' 2" (1.88 m)   Wt 109.4 kg (241 lb 2.9 oz)   BMI 30.97 kg/m²   Body mass index is 30.97 kg/m².  Physical Exam   Constitutional: He is oriented to person, place, and time and well-developed, well-nourished, and in no distress. No distress.   HENT:   Head: Normocephalic.   Eyes: Conjunctivae are normal. Pupils are equal, round, and reactive to light.   Cardiovascular: Normal rate, regular rhythm and normal heart sounds.    Pulmonary/Chest: Effort normal and breath sounds normal. No respiratory distress.   Abdominal: Soft. Bowel sounds are normal. He exhibits no distension. There is no tenderness.   Neurological: He is alert and oriented to " person, place, and time. No cranial nerve deficit.   Skin: Skin is warm and dry. No rash noted.   Psychiatric: Mood and affect normal.       Labs: Pertinent labs reviewed.    Assessment:  1. Change in bowel habit    2. Right flank pain    3. RUQ abdominal pain    4. Lower abdominal pain        Recommendations:  - unclear etiology of symptoms.   - as previously discussed with patient, I am suspicious of a non-GI cause. Seems like his pain could be coming from his spine. Previously recommended follow up with primary care before GI workup was done as I had low suspicion for GI abnormality  - non-contrast CT showed normal gallbladder. No biliary colic symptoms so do not feel that HIDA would be beneficial.   - he expresses concern and anxiety about pain   - will perform a GI workup as he is also having a change in stool habit  - plan for xray and labs today  - ultrasound and colonoscopy in the future  - consider contrasted CT if all other unrevealing  - recommended he follow up with PCP for symptoms and evaluation of spine.  -     X-Ray Abdomen Flat And Erect; Future; Expected date: 06/20/2018  -     Case request GI: COLONOSCOPY  -     SUPREP BOWEL PREP KIT 17.5-3.13-1.6 gram SolR; Use as directed  Dispense: 354 mL; Refill: 0  -     US Abdomen Complete; Future; Expected date: 06/20/2018  -     CBC auto differential; Future; Expected date: 06/20/2018  -     Comprehensive metabolic panel; Future; Expected date: 06/20/2018  -     Sedimentation rate; Future; Expected date: 06/20/2018  -     C-reactive protein; Future; Expected date: 06/20/2018  -     Lipase; Future; Expected date: 06/20/2018    Return to Clinic:  Follow up to be determined after procedure.    Thank you for the opportunity to participate in the care of this patient.  PARUL De Paz

## 2018-06-21 ENCOUNTER — HOSPITAL ENCOUNTER (OUTPATIENT)
Dept: RADIOLOGY | Facility: HOSPITAL | Age: 48
Discharge: HOME | End: 2018-06-21
Attending: NURSE PRACTITIONER
Payer: COMMERCIAL

## 2018-06-21 DIAGNOSIS — R19.4 CHANGE IN BOWEL HABIT: ICD-10-CM

## 2018-06-21 DIAGNOSIS — R10.31 RIGHT LOWER QUADRANT ABDOMINAL PAIN: ICD-10-CM

## 2018-06-21 DIAGNOSIS — R10.9 RIGHT FLANK PAIN: ICD-10-CM

## 2018-06-21 DIAGNOSIS — R10.11 RUQ ABDOMINAL PAIN: ICD-10-CM

## 2018-06-21 PROCEDURE — 76700 US EXAM ABDOM COMPLETE: CPT | Mod: TC

## 2018-07-02 ENCOUNTER — OFFICE VISIT (OUTPATIENT)
Dept: INTERNAL MEDICINE | Facility: CLINIC | Age: 48
End: 2018-07-02
Payer: COMMERCIAL

## 2018-07-02 VITALS
HEIGHT: 74 IN | DIASTOLIC BLOOD PRESSURE: 80 MMHG | HEART RATE: 66 BPM | BODY MASS INDEX: 30.21 KG/M2 | TEMPERATURE: 97 F | WEIGHT: 235.44 LBS | SYSTOLIC BLOOD PRESSURE: 130 MMHG

## 2018-07-02 DIAGNOSIS — G89.29 CHRONIC BILATERAL LOW BACK PAIN WITHOUT SCIATICA: Primary | ICD-10-CM

## 2018-07-02 DIAGNOSIS — M54.50 CHRONIC BILATERAL LOW BACK PAIN WITHOUT SCIATICA: Primary | ICD-10-CM

## 2018-07-02 PROCEDURE — 3075F SYST BP GE 130 - 139MM HG: CPT | Mod: CPTII,S$GLB,, | Performed by: FAMILY MEDICINE

## 2018-07-02 PROCEDURE — 3079F DIAST BP 80-89 MM HG: CPT | Mod: CPTII,S$GLB,, | Performed by: FAMILY MEDICINE

## 2018-07-02 PROCEDURE — 3008F BODY MASS INDEX DOCD: CPT | Mod: CPTII,S$GLB,, | Performed by: FAMILY MEDICINE

## 2018-07-02 PROCEDURE — 96372 THER/PROPH/DIAG INJ SC/IM: CPT | Mod: S$GLB,,, | Performed by: FAMILY MEDICINE

## 2018-07-02 PROCEDURE — 99213 OFFICE O/P EST LOW 20 MIN: CPT | Mod: 25,S$GLB,, | Performed by: FAMILY MEDICINE

## 2018-07-02 PROCEDURE — 99999 PR PBB SHADOW E&M-EST. PATIENT-LVL III: CPT | Mod: PBBFAC,,, | Performed by: FAMILY MEDICINE

## 2018-07-02 RX ORDER — METHYLPREDNISOLONE 4 MG/1
TABLET ORAL
Qty: 1 PACKAGE | Refills: 0 | Status: SHIPPED | OUTPATIENT
Start: 2018-07-02 | End: 2018-07-23

## 2018-07-02 RX ORDER — GABAPENTIN 300 MG/1
300 CAPSULE ORAL NIGHTLY
Qty: 30 CAPSULE | Refills: 0 | Status: SHIPPED | OUTPATIENT
Start: 2018-07-02 | End: 2020-06-11

## 2018-07-02 RX ORDER — HYDROCORTISONE 25 MG/ML
SOLUTION TOPICAL
Refills: 2 | COMMUNITY
Start: 2018-06-04

## 2018-07-02 RX ORDER — HYP AC/SOD CHL/SOD SUL/SOD PHO 0.009 %
SPRAY, NON-AEROSOL (ML) TOPICAL
Refills: 3 | COMMUNITY
Start: 2018-06-04

## 2018-07-02 RX ORDER — KETOROLAC TROMETHAMINE 30 MG/ML
60 INJECTION, SOLUTION INTRAMUSCULAR; INTRAVENOUS
Status: COMPLETED | OUTPATIENT
Start: 2018-07-02 | End: 2018-07-02

## 2018-07-02 RX ADMIN — KETOROLAC TROMETHAMINE 60 MG: 30 INJECTION, SOLUTION INTRAMUSCULAR; INTRAVENOUS at 08:07

## 2018-07-04 NOTE — PROGRESS NOTES
"Subjective:      Patient ID: Jimi Clark is a 47 y.o. male.    Chief Complaint: Back Pain    HPI  46 yo male with hx of low back pain with prior bulging disc/managed/injected by Dr. Ferreira.  That was nearly 3 yrs ago and he hadn't had any issues until recent few mos.  No trauma/injury.  He just woke one day with low back pain/R sided.  He occ feels "like a cell phone in his pocket" buzzing in his legs.  No sharp, shooting pains down the buttocks/legs.  No bowel/urinary incontinence.  He was seen by Dr. Yuan and given flexeril, this hasn't helped much.  NSAIDs not real helpful.  Pain is positional.  Standing feels better.      Past Medical History:   Diagnosis Date    Back pain     Dr. 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   Musculoskeletal: Positive for back pain.       Objective:     Physical Exam   Constitutional: He is oriented to person, place, and time. He appears well-developed and well-nourished.   Cardiovascular: Normal rate, regular rhythm and normal heart sounds.    Pulmonary/Chest: Effort " normal and breath sounds normal. No respiratory distress. He has no wheezes.   Musculoskeletal:        Lumbar back: He exhibits decreased range of motion. He exhibits no bony tenderness.        Back:    Pain with rotation and flexion   Neurological: He is alert and oriented to person, place, and time.   Nursing note and vitals reviewed.      Lab Results   Component Value Date    WBC 7.38 06/20/2018    HGB 14.2 06/20/2018    HCT 42.0 06/20/2018     06/20/2018    CHOL 149 05/14/2018    TRIG 67 05/14/2018    HDL 49 05/14/2018    ALT 14 06/20/2018    AST 20 06/20/2018     06/20/2018    K 4.3 06/20/2018     06/20/2018    CREATININE 1.5 (H) 06/20/2018    BUN 15 06/20/2018    CO2 30 (H) 06/20/2018    TSH 1.337 05/14/2018    PSA 2.1 05/14/2018    HGBA1C 5.3 05/14/2018       Assessment:     1. Chronic bilateral low back pain without sciatica         Plan:     Chronic bilateral low back pain without sciatica    Other orders  -     methylPREDNISolone (MEDROL DOSEPACK) 4 mg tablet; Take as directed  Dispense: 1 Package; Refill: 0  -     gabapentin (NEURONTIN) 300 MG capsule; Take 1 capsule (300 mg total) by mouth every evening.  Dispense: 30 capsule; Refill: 0  -     ketorolac injection 60 mg; Inject 2 mLs (60 mg total) into the muscle one time.    Reviewed old imaging on file  He had some recent abnormal/elevateed Cr.  Avoid NSAIDs orally  Will give Toradol 60mg IM now  Start medrol dose dang and some gabapentin.  Can use flexeril at bedtime  Ok to use short duration of heat.  Gentle stretching, if no relief will consider PT  F/u PRN

## 2018-07-17 ENCOUNTER — PATIENT MESSAGE (OUTPATIENT)
Dept: INTERNAL MEDICINE | Facility: CLINIC | Age: 48
End: 2018-07-17

## 2018-08-07 ENCOUNTER — SURGERY (OUTPATIENT)
Age: 48
End: 2018-08-07

## 2018-08-07 ENCOUNTER — ANESTHESIA EVENT (OUTPATIENT)
Dept: ENDOSCOPY | Facility: HOSPITAL | Age: 48
End: 2018-08-07
Payer: COMMERCIAL

## 2018-08-07 ENCOUNTER — HOSPITAL ENCOUNTER (OUTPATIENT)
Facility: HOSPITAL | Age: 48
Discharge: HOME | End: 2018-08-07
Attending: FAMILY MEDICINE | Admitting: FAMILY MEDICINE
Payer: COMMERCIAL

## 2018-08-07 ENCOUNTER — ANESTHESIA (OUTPATIENT)
Dept: ENDOSCOPY | Facility: HOSPITAL | Age: 48
End: 2018-08-07
Payer: COMMERCIAL

## 2018-08-07 DIAGNOSIS — R10.30 LOWER ABDOMINAL PAIN: ICD-10-CM

## 2018-08-07 DIAGNOSIS — R19.8 CHANGE IN BOWEL FUNCTION: Primary | ICD-10-CM

## 2018-08-07 DIAGNOSIS — K63.5 POLYP OF COLON, UNSPECIFIED PART OF COLON, UNSPECIFIED TYPE: ICD-10-CM

## 2018-08-07 DIAGNOSIS — R10.11 RUQ ABDOMINAL PAIN: ICD-10-CM

## 2018-08-07 PROCEDURE — 27201089 HC SNARE, DISP (ANY): Performed by: FAMILY MEDICINE

## 2018-08-07 PROCEDURE — 45385 COLONOSCOPY W/LESION REMOVAL: CPT | Mod: ,,, | Performed by: FAMILY MEDICINE

## 2018-08-07 PROCEDURE — 27201012 HC FORCEPS, HOT/COLD, DISP: Performed by: FAMILY MEDICINE

## 2018-08-07 PROCEDURE — 63600175 PHARM REV CODE 636 W HCPCS: Performed by: NURSE ANESTHETIST, CERTIFIED REGISTERED

## 2018-08-07 PROCEDURE — 45380 COLONOSCOPY AND BIOPSY: CPT | Performed by: FAMILY MEDICINE

## 2018-08-07 PROCEDURE — 88305 TISSUE EXAM BY PATHOLOGIST: CPT | Performed by: PATHOLOGY

## 2018-08-07 PROCEDURE — 88305 TISSUE EXAM BY PATHOLOGIST: CPT | Mod: 26,,, | Performed by: PATHOLOGY

## 2018-08-07 PROCEDURE — 37000008 HC ANESTHESIA 1ST 15 MINUTES: Performed by: FAMILY MEDICINE

## 2018-08-07 PROCEDURE — 25000003 PHARM REV CODE 250: Performed by: NURSE ANESTHETIST, CERTIFIED REGISTERED

## 2018-08-07 PROCEDURE — 37000009 HC ANESTHESIA EA ADD 15 MINS: Performed by: FAMILY MEDICINE

## 2018-08-07 PROCEDURE — 25000003 PHARM REV CODE 250: Performed by: FAMILY MEDICINE

## 2018-08-07 PROCEDURE — 45385 COLONOSCOPY W/LESION REMOVAL: CPT | Performed by: FAMILY MEDICINE

## 2018-08-07 PROCEDURE — 45380 COLONOSCOPY AND BIOPSY: CPT | Mod: 59,,, | Performed by: FAMILY MEDICINE

## 2018-08-07 RX ORDER — SODIUM CHLORIDE, SODIUM LACTATE, POTASSIUM CHLORIDE, CALCIUM CHLORIDE 600; 310; 30; 20 MG/100ML; MG/100ML; MG/100ML; MG/100ML
INJECTION, SOLUTION INTRAVENOUS CONTINUOUS
Status: DISCONTINUED | OUTPATIENT
Start: 2018-08-07 | End: 2018-08-07 | Stop reason: HOSPADM

## 2018-08-07 RX ORDER — SODIUM CHLORIDE 0.9 % (FLUSH) 0.9 %
3 SYRINGE (ML) INJECTION
Status: CANCELLED | OUTPATIENT
Start: 2018-08-07

## 2018-08-07 RX ORDER — SODIUM CHLORIDE, SODIUM LACTATE, POTASSIUM CHLORIDE, CALCIUM CHLORIDE 600; 310; 30; 20 MG/100ML; MG/100ML; MG/100ML; MG/100ML
INJECTION, SOLUTION INTRAVENOUS CONTINUOUS PRN
Status: DISCONTINUED | OUTPATIENT
Start: 2018-08-07 | End: 2018-08-07

## 2018-08-07 RX ORDER — PROPOFOL 10 MG/ML
VIAL (ML) INTRAVENOUS
Status: DISCONTINUED | OUTPATIENT
Start: 2018-08-07 | End: 2018-08-07

## 2018-08-07 RX ORDER — LIDOCAINE HYDROCHLORIDE 10 MG/ML
INJECTION, SOLUTION EPIDURAL; INFILTRATION; INTRACAUDAL; PERINEURAL
Status: DISCONTINUED | OUTPATIENT
Start: 2018-08-07 | End: 2018-08-07

## 2018-08-07 RX ADMIN — LIDOCAINE HYDROCHLORIDE 60 MG: 10 INJECTION, SOLUTION EPIDURAL; INFILTRATION; INTRACAUDAL; PERINEURAL at 01:08

## 2018-08-07 RX ADMIN — SODIUM CHLORIDE, SODIUM LACTATE, POTASSIUM CHLORIDE, AND CALCIUM CHLORIDE: 600; 310; 30; 20 INJECTION, SOLUTION INTRAVENOUS at 01:08

## 2018-08-07 RX ADMIN — PROPOFOL 40 MG: 10 INJECTION, EMULSION INTRAVENOUS at 01:08

## 2018-08-07 RX ADMIN — PROPOFOL 80 MG: 10 INJECTION, EMULSION INTRAVENOUS at 01:08

## 2018-08-07 RX ADMIN — SODIUM CHLORIDE, SODIUM LACTATE, POTASSIUM CHLORIDE, AND CALCIUM CHLORIDE: .6; .31; .03; .02 INJECTION, SOLUTION INTRAVENOUS at 12:08

## 2018-08-07 NOTE — DISCHARGE INSTRUCTIONS

## 2018-08-07 NOTE — TRANSFER OF CARE
"Anesthesia Transfer of Care Note    Patient: Jimi Clark    Procedure(s) Performed: Procedure(s) (LRB):  COLONOSCOPY (N/A)    Transport from OR: Transported from OR on room air with adequate spontaneous ventilation    Post pain: adequate analgesia    Post assessment: no apparent anesthetic complications    Post vital signs: stable    Level of consciousness: awake    Nausea/Vomiting: no nausea/vomiting    Complications: none    Transfer of care protocol was followed      Last vitals:   Visit Vitals  /79   Pulse 84   Temp 36.8 °C (98.3 °F) (Oral)   Resp 20   Ht 6' 3" (1.905 m)   Wt 105.7 kg (233 lb)   SpO2 98%   BMI 29.12 kg/m²     "

## 2018-08-07 NOTE — ANESTHESIA RELEASE NOTE
"Anesthesia Release from PACU Note    Patient: Jimi Clark    Procedure(s) Performed: Procedure(s) (LRB):  COLONOSCOPY (N/A)    Anesthesia type: MAC    Post pain: Adequate analgesia    Post assessment: no apparent anesthetic complications and tolerated procedure well    Last Vitals:   Visit Vitals  /79   Pulse 84   Temp 36.8 °C (98.3 °F) (Oral)   Resp 20   Ht 6' 3" (1.905 m)   Wt 105.7 kg (233 lb)   SpO2 98%   BMI 29.12 kg/m²       Post vital signs: stable    Level of consciousness: awake, alert  and oriented    Nausea/Vomiting: no nausea/no vomiting    Complications: none    Airway Patency: patent    Respiratory: unassisted, spontaneous ventilation, room air    Cardiovascular: stable and blood pressure at baseline    Hydration: euvolemic  "

## 2018-08-07 NOTE — OR NURSING
+++++  1. Ascending colon polyps. 2. Sigmoid colon polyps 3. Rectum polyp.  Patient tolerated procedure well.

## 2018-08-07 NOTE — [employer] PATIENT INSTRUCTIONS
Discharge Summary/Instructions after an Endoscopic Procedure  Patient Name: Jimi Clark  Patient MRN: [id number]  Patient YOB: 1970 Tuesday, August 07, 2018 Wilber Chavez MD  RESTRICTIONS:  During your procedure today, you received medications for sedation.  These   medications may affect your judgment, balance and coordination.  Therefore,   for 24 hours, you have the following restrictions:   - DO NOT drive a car, operate machinery, make legal/financial decisions,   sign important papers or drink alcohol.    ACTIVITY:  Today: no heavy lifting, straining or running due to procedural   sedation/anesthesia.  The following day: return to full activity including work.  DIET:  Eat and drink normally unless instructed otherwise.     TREATMENT FOR COMMON SIDE EFFECTS:  - Mild abdominal pain, nausea, belching, bloating or excessive gas:  rest,   eat lightly and use a heating pad.  - Sore Throat: treat with throat lozenges and/or gargle with warm salt   water.  - Because air was used during the procedure, expelling large amounts of air   from your rectum or belching is normal.  - If a bowel prep was taken, you may not have a bowel movement for 1-3 days.    This is normal.  SYMPTOMS TO WATCH FOR AND REPORT TO YOUR PHYSICIAN:  1. Abdominal pain or bloating, other than gas cramps.  2. Chest pain.  3. Back pain.  4. Signs of infection such as: chills or fever occurring within 24 hours   after the procedure.  5. Rectal bleeding, which would show as bright red, maroon, or black stools.   (A tablespoon of blood from the rectum is not serious, especially if   hemorrhoids are present.)  6. Vomiting.  7. Weakness or dizziness.  GO DIRECTLY TO THE NEAREST EMERGENCY ROOM IF YOU HAVE ANY OF THE FOLLOWING:      Difficulty breathing              Chills and/or fever over 101 F   Persistent vomiting and/or vomiting blood   Severe abdominal pain   Severe chest pain   Black, tarry stools   Bleeding- more than one  tablespoon   Any other symptom or condition that you feel may need urgent attention  Your doctor recommends these additional instructions:  If any biopsies were taken, your doctors clinic will contact you in 1 to 2   weeks with any results.  - Patient has a contact number available for emergencies.  The signs and   symptoms of potential delayed complications were discussed with the   patient.  Return to normal activities tomorrow.  Written discharge   instructions were provided to the patient.   - Resume previous diet.   - Continue present medications.   - Await pathology results.   - Repeat colonoscopy in 3 years for surveillance.   - Telephone my office for pathology results in 1 week.   - Return to referring physician in 2 weeks.   - Discharge patient to home (via wheelchair).  For questions, problems or results please call your physician Wilber Chavez MD at Work:  [phone]  If you have any questions about the above instructions, call the GI   department at [phone] or call the endoscopy unit at [phone]   from 7am until 3 pm.  [facility], EMERGENCY ROOM PHONE NUMBER:   [phone]  IF A COMPLICATION OR EMERGENCY SITUATION ARISES AND YOU ARE UNABLE TO REACH   YOUR PHYSICIAN - GO DIRECTLY TO THE EMERGENCY ROOM.  I have read or have had read to me these discharge instructions for my   procedure and have received a written copy.  I understand these   instructions and will follow-up with my physician if I have any questions.     __________________________________       _____________________________________  Nurse Signature                                          Patient/Designated   Responsible Party Signature  Wilber Chavez MD  8/7/2018 1:41:55 PM  This report has been verified and signed electronically.  [employer]

## 2018-08-07 NOTE — DISCHARGE SUMMARY
Endoscopy Discharge Summary      Admit Date: 8/7/2018    Discharge Date and Time:  8/7/2018 1:43 PM    Attending Physician: Wilber Chavez MD     Discharge Physician: Wilber Chavez MD     Principal Admitting Diagnoses: Change in bowel function         Discharge Diagnosis: The primary encounter diagnosis was Change in bowel function. Diagnoses of Lower abdominal pain, RUQ abdominal pain, and Polyp of colon, unspecified part of colon, unspecified type were also pertinent to this visit.     Discharged Condition: Good    Indication for Admission: Change in bowel function     Hospital Course: Patient was admitted for an inpatient procedure and tolerated the procedure well with no complications.    Significant Diagnostic Studies: Colonoscopy with cold biopsy polypectomy and Colonoscopy with cold snare polypectomy    Pathology (if any):  Specimen (12h ago through future)    Start     Ordered    08/07/18 1327  Specimen to Pathology - Surgery  Once     Comments:  1. Ascending colon polyps.2. Sigmoid colon polyp3. Rectum polyp      08/07/18 1337          Estimated Blood Loss: 1 ml.    Discussed with: patient and family.    Disposition: Home.    Follow Up/Patient Instructions:   Current Discharge Medication List      CONTINUE these medications which have NOT CHANGED    Details   azelastine-fluticasone 137-50 mcg/spray [place] 1 spray by Nasal route.      betamethasone valerate (LUXIQ) 0.12 % foam 2 (two) times daily.  Refills: 2      CELACYN GlwP APPLY 1 GRAM ON THE SKIN BID AA  Refills: 3      desoximetasone 0.05 % Oint ELODIA TO CHEST BID  Refills: 1      gabapentin (NEURONTIN) 300 MG capsule Take 1 capsule (300 mg total) by mouth every evening.  Qty: 30 capsule, Refills: 0      lisinopril-hydrochlorothiazide (PRINZIDE,ZESTORETIC) 20-12.5 mg per tablet Take 1 tablet by mouth once daily.  Qty: 90 tablet, Refills: 4      T[place] 2.5 % Soln APPLY ON THE SKIN QOD WITH QTIP  Refills: 2      polyethylene glycol (GLYCOLAX) 17 gram  PwPk 1 pack every other day for a couple of weeks to regulate bowels  Qty: 10 each, Refills: 0               Discharge Procedure Orders  Diet general     Call MD for:  temperature >100.4     Call MD for:  persistent nausea and vomiting     Call MD for:  severe uncontrolled pain     Call MD for:  difficulty breathing, headache or visual disturbances     Call MD for:  redness, tenderness, or signs of infection (pain, swelling, redness, odor or green/yellow discharge around incision site)     Call MD for:  hives     Call MD for:  persistent dizziness or light-headedness     No dressing needed         Follow-up Information     Wilber Chavez MD. Call in 1 week.    Specialty:  Family Medicine  Why:  To receive pathology results.  Contact information:  [address] [facility] [address] [phone]             Mack Beebe MD. Go in 2 weeks.    Specialty:  Family Medicine  Contact information:  [address] [place] [address] [phone]                   [email or web address]@

## 2018-08-07 NOTE — ANESTHESIA PREPROCEDURE EVALUATION
08/07/2018  Jimi Clark is a 47 y.o., male.    Pre-op Assessment    I have reviewed the Patient Summary Reports.     I have reviewed the Nursing Notes.   I have reviewed the Medications.     Review of Systems  Anesthesia Hx:  No problems with previous Anesthesia  History of prior surgery of interest to airway management or planning: Denies Family Hx of Anesthesia complications.   Denies Personal Hx of Anesthesia complications.   Social:  Non-Smoker    Hematology/Oncology:  Hematology Normal   Oncology Normal     Cardiovascular:   Hypertension    Pulmonary:  Pulmonary Normal    Renal/:  Renal/ Normal     Hepatic/GI:   Bowel Prep.    Musculoskeletal:  Musculoskeletal Normal    Neurological:  Neurology Normal    Endocrine:  Endocrine Normal    Psych:  Psychiatric Normal           Physical Exam  General:  Well nourished    Airway/Jaw/Neck:  Airway Findings: Mouth Opening: Normal Tongue: Normal  General Airway Assessment: Adult  Mallampati: II  Improves to II with phonation.  TM Distance: Normal, at least 6 cm       Chest/Lungs:  Chest/Lungs Findings: Normal Respiratory Rate     Heart/Vascular:  Heart Findings: Rate: Normal        Mental Status:  Mental Status Findings:  Cooperative, Alert and Oriented         Anesthesia Plan  Type of Anesthesia, risks & benefits discussed:  Anesthesia Type:  MAC  Patient's Preference:   Intra-op Monitoring Plan: standard ASA monitors  Intra-op Monitoring Plan Comments:   Post Op Pain Control Plan:   Post Op Pain Control Plan Comments:   Induction:   IV  Beta Blocker:  Patient is not currently on a Beta-Blocker (No further documentation required).       Informed Consent: Patient understands risks and agrees with Anesthesia plan.  Questions answered. Anesthesia consent signed with patient.  ASA Score: 2     Day of Surgery Review of History & Physical:  There are no  significant changes.

## 2018-08-07 NOTE — H&P
Short Stay Endoscopy History and Physical    PCP - Mack Beebe MD    Procedure - Colonoscopy  ASA - 2  Mallampati - per anesthesia  History of Anesthesia problems - no  Family history Anesthesia problems -  no     HPI:  This is a 47 y.o. male here for evaluation of :   Active Hospital Problems    Diagnosis  POA    *Change in bowel function [R19.8]  Yes     He has had a change in bowel function and abdominal pain.  Before his initial consult, he was seen at [facility] and had a non-contrast CT scan and labs which was unrevealing. His pain sometimes starts in the back and radiates front. Sometimes pain starts in the abdomen on the right side only. There is no relationship to eating or timing of the day. Pain is triggered by movement such as bending over to tie his shoes and getting out of the car. Other exacerbating movements include coughing and deep breathing. He was diagnosed with constipation when he was seen at the ER. He has been taking Miralax but reports stools are inconsistent and have been changing. He goes from hard formed stools to loose stools. No hematochezia or melena.       Lower abdominal pain [R10.30]  Yes    RUQ abdominal pain [R10.11]  Yes      Resolved Hospital Problems    Diagnosis Date Resolved POA   No resolved problems to display.         Health Maintenance       Date Due Completion Date    Influenza Vaccine 08/01/2018 11/2/2015 (N/S)    Override on 11/2/2015: (N/S)    PROSTATE-SPECIFIC ANTIGEN 05/14/2019 5/14/2018    Lipid Panel 05/14/2023 5/14/2018    TETANUS VACCINE 01/06/2025 1/6/2015          ROS:  CONSTITUTIONAL: Denies weight change,  fatigue, fevers, chills, night sweats.  CARDIOVASCULAR: Denies chest pain, shortness of breath, orthopnea and edema.  RESPIRATORY: Denies cough, hemoptysis, dyspnea, and wheezing.  GI: See HPI.    Medical History:   Past Medical History:   Diagnosis Date    Back pain     Dr. 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      TONSILLECTOMY      WISDOM TOOTH EXTRACTION         Family History:   Family History   Problem Relation Age of Onset    Alcohol abuse Mother     Stroke Mother     Heart disease Mother     Heart disease Father     Stroke Father     Heart disease Maternal Aunt     Heart attack Maternal Aunt 59    Heart disease Maternal Grandmother     Cancer Maternal Grandfather         prostate       Social History:   Social History   Substance Use Topics    Smoking status: Never Smoker    Smokeless tobacco: Never Used    Alcohol use No       Allergies:   Review of patient's allergies indicates:  No Known Allergies    Medications:   No current facility-administered medications on file prior to encounter.      Current Outpatient Prescriptions on File Prior to Encounter   Medication Sig Dispense Refill    azelastine-fluticasone 137-50 mcg/spray [place] 1 spray by Nasal route.      betamethasone valerate (LUXIQ) 0.12 % foam 2 (two) times daily.  2    desoximetasone 0.05 % Oint ELODIA TO CHEST BID  1    lisinopril-hydrochlorothiazide (PRINZIDE,ZESTORETIC) 20-12.5 mg per tablet Take 1 tablet by mouth once daily. 90 tablet 4    polyethylene glycol (GLYCOLAX) 17 gram PwPk 1 pack every other day for a couple of weeks to regulate bowels 10 each 0    [DISCONTINUED] SUPREP BOWEL PREP KIT 17.5-3.13-1.6 gram SolR Use as directed 354 mL 0       Physical Exam:  Vital Signs:   Vitals:    08/07/18 1157   BP: 127/87   Pulse: 64   Resp: 17   Temp: 98.3 °F (36.8 °C)     General Appearance: Well appearing in no acute distress  ENT: OP clear  Chest: CTA B  CV: RRR, no m/r/g  Abd: s/nt/nd/nabs  Ext: no edema    Labs:Reviewed    IMP:  Active Hospital Problems    Diagnosis  POA    *Change in bowel function [R19.8]  Yes     He has had a change in bowel function and  abdominal pain.  Before his initial consult, he was seen at [facility] and had a non-contrast CT scan and labs which was unrevealing. His pain sometimes starts in the back and radiates front. Sometimes pain starts in the abdomen on the right side only. There is no relationship to eating or timing of the day. Pain is triggered by movement such as bending over to tie his shoes and getting out of the car. Other exacerbating movements include coughing and deep breathing. He was diagnosed with constipation when he was seen at the ER. He has been taking Miralax but reports stools are inconsistent and have been changing. He goes from hard formed stools to loose stools. No hematochezia or melena.       Lower abdominal pain [R10.30]  Yes    RUQ abdominal pain [R10.11]  Yes      Resolved Hospital Problems    Diagnosis Date Resolved POA   No resolved problems to display.         Plan:   I have explained the risks and benefits of colonoscopy to the patient including but not limited to bleeding, perforation, infection, and death. The patient wishes to proceed.

## 2018-08-07 NOTE — ANESTHESIA POSTPROCEDURE EVALUATION
"Anesthesia Post Evaluation    Patient: Jimi Clark    Procedure(s) Performed: Procedure(s) (LRB):  COLONOSCOPY (N/A)    Final Anesthesia Type: MAC  Patient location during evaluation: GI PACU  Patient participation: Yes- Able to Participate  Level of consciousness: awake and alert  Post-procedure vital signs: reviewed and stable  Pain management: adequate  Airway patency: patent  PONV status at discharge: No PONV  Anesthetic complications: no      Cardiovascular status: blood pressure returned to baseline  Respiratory status: unassisted, spontaneous ventilation and room air  Hydration status: euvolemic  Follow-up not needed.        Visit Vitals  /79   Pulse 84   Temp 36.8 °C (98.3 °F) (Oral)   Resp 20   Ht 6' 3" (1.905 m)   Wt 105.7 kg (233 lb)   SpO2 98%   BMI 29.12 kg/m²       Pain/Rupali Score: Pain Assessment Performed: Yes (8/7/2018 11:48 AM)  Presence of Pain: denies (8/7/2018 11:48 AM)      "

## 2018-08-08 VITALS
HEART RATE: 80 BPM | DIASTOLIC BLOOD PRESSURE: 69 MMHG | WEIGHT: 233 LBS | SYSTOLIC BLOOD PRESSURE: 101 MMHG | BODY MASS INDEX: 28.97 KG/M2 | OXYGEN SATURATION: 100 % | TEMPERATURE: 98 F | HEIGHT: 75 IN | RESPIRATION RATE: 19 BRPM

## 2018-08-13 NOTE — PROGRESS NOTES
Dear Mack Beebe MD,    I recently cared for Jimi Clark and performed an endoscopy.  Tissue was sent for pathology evaluation and I will have a letter written to ask the patient to repeat the colonoscopy in 3 years.  The pathology showed that there was adenomatous tissue present.  Thank you for allowing me to participate in the care of your patient.  Please call me for any questions that you might have.      Dr. Wilber Chavez  [phone] cell  [phone] office      NURSING STAFF:Please  inform the patient that I reviewed the recent pathology obtained at the time of colonoscopy.    The results showed that there was adenomatous tissue present which is benign and based on that, I recommend that the patient have a repeat colonoscopy performed in 3 years.     If the patient has MyChart, this message has been sent to them.  Confirm that they read the note.  If not, copy the information and print a letter to send to the patient at this time.  Confirm that a notation to the PCP was done.      Dear Jimi Clark,    This is to inform you that I have reviewed your recent colonoscopy pathology.  The results showed that you had adenomatous tissue present which is benign and based on that, I recommend that you have a repeat colonoscopy performed in 3 years.      Dr. Wilber Chavez  [phone]

## 2018-08-20 PROBLEM — Z00.00 ANNUAL PHYSICAL EXAM: Status: RESOLVED | Noted: 2018-05-21 | Resolved: 2018-08-20

## 2018-09-20 NOTE — MR AVS SNAPSHOT
[facility]  [address] [place] [address]  Phone: [phone]  Fax: [phone]                  Jimi Clark   3/7/2017 1:10 PM   Office Visit    Description:  Male : 1970   Provider:  Leticia Bell PA-C   Department:  [facility]           Reason for Visit     Hypertension           Diagnoses this Visit        Comments    Essential hypertension    -  Primary            To Do List           Future Appointments        Provider Department Dept Phone    3/8/2017 9:40 AM [phone]    3/13/2017 8:20 AM [facility] [phone]      Goals (5 Years of Data)     None      Follow-Up and Disposition     Return if symptoms worsen or fail to improve.      [facility] On Call     [facility] On Call Nurse Care Line -  Assistance  Registered nurses in the [facility] On Call Center provide clinical advisement, health education, appointment booking, and other advisory services.  Call for this free service at [phone].             Medications           Message regarding Medications     Verify the changes and/or additions to your medication regime listed below are the same as discussed with your clinician today.  If any of these changes or additions are incorrect, please notify your healthcare provider.        STOP taking these medications     azithromycin (Z-TL) 250 MG tablet As per packet instructions    ondansetron (ZOFRAN-ODT) 4 MG TbDL Take 1 tablet (4 mg total) by mouth every 8 (eight) hours as needed (nausea).    loratadine-pseudoephedrine 5-120 mg (CLARITIN-D 12-HOUR) 5-120 mg per tablet Take 1 tablet by mouth.           Verify that the below list of medications is an accurate representation of the medications you are currently taking.  If none reported, the list may be blank. If incorrect, please contact your healthcare provider. Carry this list with  "you in case of emergency.           Current Medications     azelastine-fluticasone 137-50 mcg/spray [place] 1 spray by Nasal route.    desoximetasone 0.05 % Oint ELODIA TO CHEST BID    lisinopril 10 MG tablet Take 1 tablet (10 mg total) by mouth once daily.           Clinical Reference Information           Your Vitals Were     BP Pulse Temp Height    164/90 (BP Location: Right arm, Patient Position: Sitting, BP Method: Manual) 63 98.5 °F (36.9 °C) (Oral) 6' 2.5" (1.892 m)    Weight SpO2 BMI    111.1 kg (244 lb 14.9 oz) 99% 31.03 kg/m2      Blood Pressure          Most Recent Value    BP  (!)  164/90 [(L) arm 162 94]      Allergies as of 3/7/2017     No Known Allergies      Immunizations Administered on Date of Encounter - 3/7/2017     None      Instructions      Out of Control High Blood Pressure (Established)    Your blood pressure was unusually high today. This can occur if youve missed doses of your blood pressure medicine. Or it can happen if you are taking other medicines. These include some asthma inhalers, decongestants, diet pills, and street drugs like cocaine and amphetamine.  Other causes include:  · Weight gain  · More salt in your diet  · Smoking  · Caffeine  Your blood pressure can also rise if you are emotionally upset or in intense pain. It may go back to normal after a period of rest.  A blood pressure reading is made up of 2 numbers. There is a top number over a bottom number. The top number is the systolic pressure. The bottom number is the diastolic pressure. A normal blood pressure is less than 120 over less than 80. High blood pressure (hypertension) is when the top number is 140 or higher. Or it is when the bottom number is 90 or higher. To be high blood pressure, the numbers must be higher when tested over a period of time. The blood pressures between normal and hypertension are called prehypertension.  Home care  Its important to take steps to lower your blood pressure. If you are taking blood " pressure medicine, the guidelines below may help you need less or no medicines in the future.  · Begin a weight-loss program if you are overweight.  · Cut back on the amount of salt in your diet:  ¨ Avoid high-salt foods like olives, pickles, smoked meats, and salted potato chips.  ¨ Dont add salt to your food at the table.  ¨ Use only small amounts of salt when cooking.  · Begin an exercise program. Talk with your health care provider about what exercise program is best for you. It doesnt have to be difficult. Even brisk walking for 20 minutes 3 times a week is a good form of exercise.  · Avoid medicines that have heart stimulants in them. This includes many cold and sinus decongestant pills and sprays, as well as diet pills. Check the warnings about hypertension on the label. Stimulants such as amphetamine or cocaine could be lethal for someone with hypertension. Never take these.  · Limit how much caffeine you drink. Or switch to noncaffeinated beverages.  · Stop smoking. If you are a long-time smoker, this can be hard. Enroll in a stop-smoking program to make it more likely that you will succeed. Talk with your provider about ways to quit.  · Learn how to handle stress better. This is an important part of any program to lower blood pressure. Learn ways to relax. These include meditation, yoga, and biofeedback.  · If medicines were prescribed, take them exactly as directed. Missing doses may cause your blood pressure to get out of control.  · Consider buying an automatic blood pressure machine. These are available at many drugstores. Use this to monitor your blood pressure. Report the results to your provider.  Follow-up care  Regular visits to your own health care provider for blood pressure and medicine checks are an important part of your care. Make a follow-up appointment as directed.  When to seek medical advice  Call your health care provider right away if any of these occur:  · Chest, arm, shoulder,  neck, or upper back pain  · Shortness of breath  · Severe headache  · Throbbing or rushing sound in the ears  · Nosebleed  · Extreme drowsiness, confusion, or fainting  · Dizziness or dizziness with spinning sensation (vertigo)  · Weakness in an arm or leg or on one side of the face  · Difficulty speaking or seeing   Date Last Reviewed: 11/25/2014  © 3035-3055 [employer]. [address] [place] [address], [place] [address]. All rights reserved. This information is not intended as a substitute for professional medical care. Always follow your healthcare professional's instructions.        Low-Salt Diet  This diet removes foods that are high in salt. It also limits the amount of salt you use when cooking. It is most often used for people with high blood pressure, edema (fluid retention), and kidney, liver, or heart disease.  Table salt contains the mineral sodium. Your body needs sodium to work normally. But too much sodium can make your health problems worse. Your healthcare provider is recommending a low-salt (also called low-sodium) diet for you. Your total daily allowance of salt is 1,500 to 2,300 milligrams (mg). It is less than 1 teaspoon of table salt. This means you can have only about 500 to 700 mg of sodium at each meal. People with certain health problems should limit salt intake to the lower end of the recommended range.    When you cook, dont add much salt. If you can cook without using salt, even better. Dont add salt to your food at the table.  When shopping, read food labels. Salt is often called sodium on the label. Choose foods that are salt-free, low salt, or very low salt. Note that foods with reduced salt may not lower your salt intake enough.    Beans, potatoes, and pasta  Ok: Dry beans, split peas, lentils, potatoes, rice, macaroni, pasta, spaghetti without added salt  Avoid: Potato chips, tortilla chips, and similar products  Breads and cereals  Ok: Low-sodium breads, rolls, cereals, and  cakes; low-salt crackers, matzo crackers  Avoid: Salted crackers, pretzels, popcorn, [place] toast, pancakes, muffins  Dairy  Ok: Milk, chocolate milk, hot chocolate mix, low-salt cheeses, and yogurt  Avoid: Processed cheese and cheese spreads; Roquefort, Camembert, and cottage cheese; buttermilk, instant breakfast drink  Desserts  Ok: Ice cream, frozen yogurt, juice bars, gelatin, cookies and pies, sugar, honey, jelly, hard candy  Avoid: Most pies, cakes and cookies prepared or processed with salt; instant pudding  Drinks  Ok: Tea, coffee, fizzy (carbonated) drinks, juices  Avoid: Flavored coffees, electrolyte replacement drinks, sports drinks  Meats  Ok: All fresh meat, fish, poultry, low-salt tuna, eggs, egg substitute  Avoid: Smoked, pickled, brine-cured, or salted meats and fish. This includes garnica, chipped beef, corned beef, hot dogs, deli meats, ham, kosher meats, salt pork, sausage, canned tuna, salted codfish, smoked salmon, herring, sardines, or anchovies.  Seasonings and spices  Ok: Most seasonings are okay. Good substitutes for salt include: fresh herb blends, hot sauce, lemon, garlic, osorio, vinegar, dry mustard, parsley, cilantro, horseradish, tomato paste, regular margarine, mayonnaise, unsalted butter, cream cheese, vegetable oil, cream, low-salt salad dressing and gravy.  Avoid: Regular ketchup, relishes, pickles, soy sauce, teriyaki sauce, Worcestershire sauce, BBQ sauce, tartar sauce, meat tenderizer, chili sauce, regular gravy, regular salad dressing, salted butter  Soups  Ok: Low-salt soups and broths made with allowed foods  Avoid: Bouillon cubes, soups with smoked or salted meats, regular soup and broth  Vegetables  Ok: Most vegetables are okay; also low-salt tomato and vegetable juices  Avoid: Sauerkraut and other brine-soaked vegetables; pickles and other pickled vegetables; tomato juice, olives  Date Last Reviewed: 8/1/2016  © 2770-9808 [employer]. [address] [place] [address],  [place] [address]. All rights reserved. This information is not intended as a substitute for professional medical care. Always follow your healthcare professional's instructions.             Language Assistance Services     ATTENTION: Language assistance services are available, free of charge. Please call [phone].      ATENCIÓN: Si christ poon, tiene a pizarro disposición servicios gratuitos de asistencia lingüística. Llame al [phone].     CHÚ Ý: N?u b?n nói Ti?ng Vi?t, có các d?ch v? h? tr? ngôn ng? mi?n phí dành cho b?n. G?i s? [phone].         [facility] complies with applicable Federal civil rights laws and does not discriminate on the basis of race, color, national origin, age, disability, or sex.         normal (ped)...

## 2019-06-27 ENCOUNTER — PATIENT MESSAGE (OUTPATIENT)
Dept: INTERNAL MEDICINE | Facility: CLINIC | Age: 49
End: 2019-06-27

## 2019-06-27 RX ORDER — LISINOPRIL AND HYDROCHLOROTHIAZIDE 12.5; 2 MG/1; MG/1
1 TABLET ORAL DAILY
Qty: 90 TABLET | Refills: 1 | Status: SHIPPED | OUTPATIENT
Start: 2019-06-27 | End: 2020-06-11 | Stop reason: SINTOL

## 2020-05-27 ENCOUNTER — PATIENT OUTREACH (OUTPATIENT)
Dept: ADMINISTRATIVE | Facility: HOSPITAL | Age: 50
End: 2020-05-27

## 2020-05-27 NOTE — PROGRESS NOTES
Working htn report. Pt verified Dr. Beebe is still PCP. Called pt to schedule appointment, overdue for annual. Scheduled for 6-.   *KDL*

## 2020-06-11 ENCOUNTER — TELEPHONE (OUTPATIENT)
Dept: INTERNAL MEDICINE | Facility: CLINIC | Age: 50
End: 2020-06-11

## 2020-06-11 ENCOUNTER — OFFICE VISIT (OUTPATIENT)
Dept: INTERNAL MEDICINE | Facility: CLINIC | Age: 50
End: 2020-06-11
Payer: COMMERCIAL

## 2020-06-11 ENCOUNTER — LAB VISIT (OUTPATIENT)
Dept: LAB | Facility: HOSPITAL | Age: 50
End: 2020-06-11
Attending: FAMILY MEDICINE
Payer: COMMERCIAL

## 2020-06-11 VITALS
HEART RATE: 76 BPM | TEMPERATURE: 99 F | SYSTOLIC BLOOD PRESSURE: 130 MMHG | DIASTOLIC BLOOD PRESSURE: 90 MMHG | BODY MASS INDEX: 30.7 KG/M2 | HEIGHT: 75 IN | WEIGHT: 246.94 LBS

## 2020-06-11 DIAGNOSIS — Z00.00 ANNUAL PHYSICAL EXAM: ICD-10-CM

## 2020-06-11 DIAGNOSIS — Z00.00 ANNUAL PHYSICAL EXAM: Primary | ICD-10-CM

## 2020-06-11 LAB
ALBUMIN SERPL BCP-MCNC: 4.7 G/DL (ref 3.5–5.2)
ALP SERPL-CCNC: 67 U/L (ref 55–135)
ALT SERPL W/O P-5'-P-CCNC: 16 U/L (ref 10–44)
ANION GAP SERPL CALC-SCNC: 6 MMOL/L (ref 8–16)
AST SERPL-CCNC: 22 U/L (ref 10–40)
BASOPHILS # BLD AUTO: 0.09 K/UL (ref 0–0.2)
BASOPHILS NFR BLD: 1.4 % (ref 0–1.9)
BILIRUB SERPL-MCNC: 0.6 MG/DL (ref 0.1–1)
BUN SERPL-MCNC: 17 MG/DL (ref 6–20)
CALCIUM SERPL-MCNC: 9.9 MG/DL (ref 8.7–10.5)
CHLORIDE SERPL-SCNC: 100 MMOL/L (ref 95–110)
CHOLEST SERPL-MCNC: 177 MG/DL (ref 120–199)
CHOLEST/HDLC SERPL: 3.2 {RATIO} (ref 2–5)
CO2 SERPL-SCNC: 33 MMOL/L (ref 23–29)
CREAT SERPL-MCNC: 1.9 MG/DL (ref 0.5–1.4)
DIFFERENTIAL METHOD: ABNORMAL
EOSINOPHIL # BLD AUTO: 0.4 K/UL (ref 0–0.5)
EOSINOPHIL NFR BLD: 5.6 % (ref 0–8)
ERYTHROCYTE [DISTWIDTH] IN BLOOD BY AUTOMATED COUNT: 13.5 % (ref 11.5–14.5)
EST. GFR  (AFRICAN AMERICAN): 47.5 ML/MIN/1.73 M^2
EST. GFR  (NON AFRICAN AMERICAN): 41.1 ML/MIN/1.73 M^2
GLUCOSE SERPL-MCNC: 103 MG/DL (ref 70–110)
HCT VFR BLD AUTO: 47.7 % (ref 40–54)
HDLC SERPL-MCNC: 56 MG/DL (ref 40–75)
HDLC SERPL: 31.6 % (ref 20–50)
HGB BLD-MCNC: 15.2 G/DL (ref 14–18)
IMM GRANULOCYTES # BLD AUTO: 0.03 K/UL (ref 0–0.04)
IMM GRANULOCYTES NFR BLD AUTO: 0.5 % (ref 0–0.5)
LDLC SERPL CALC-MCNC: 107.4 MG/DL (ref 63–159)
LYMPHOCYTES # BLD AUTO: 1.1 K/UL (ref 1–4.8)
LYMPHOCYTES NFR BLD: 16.2 % (ref 18–48)
MCH RBC QN AUTO: 27.7 PG (ref 27–31)
MCHC RBC AUTO-ENTMCNC: 31.9 G/DL (ref 32–36)
MCV RBC AUTO: 87 FL (ref 82–98)
MONOCYTES # BLD AUTO: 0.6 K/UL (ref 0.3–1)
MONOCYTES NFR BLD: 8.3 % (ref 4–15)
NEUTROPHILS # BLD AUTO: 4.5 K/UL (ref 1.8–7.7)
NEUTROPHILS NFR BLD: 68 % (ref 38–73)
NONHDLC SERPL-MCNC: 121 MG/DL
NRBC BLD-RTO: 0 /100 WBC
PLATELET # BLD AUTO: 205 K/UL (ref 150–350)
PMV BLD AUTO: 11.5 FL (ref 9.2–12.9)
POTASSIUM SERPL-SCNC: 3.9 MMOL/L (ref 3.5–5.1)
PROT SERPL-MCNC: 7.8 G/DL (ref 6–8.4)
RBC # BLD AUTO: 5.48 M/UL (ref 4.6–6.2)
SODIUM SERPL-SCNC: 139 MMOL/L (ref 136–145)
TRIGL SERPL-MCNC: 68 MG/DL (ref 30–150)
TSH SERPL DL<=0.005 MIU/L-ACNC: 1.1 UIU/ML (ref 0.4–4)
WBC # BLD AUTO: 6.61 K/UL (ref 3.9–12.7)

## 2020-06-11 PROCEDURE — 83036 HEMOGLOBIN GLYCOSYLATED A1C: CPT

## 2020-06-11 PROCEDURE — 84443 ASSAY THYROID STIM HORMONE: CPT

## 2020-06-11 PROCEDURE — 99999 PR PBB SHADOW E&M-EST. PATIENT-LVL III: ICD-10-PCS | Mod: PBBFAC,,, | Performed by: FAMILY MEDICINE

## 2020-06-11 PROCEDURE — 3080F PR MOST RECENT DIASTOLIC BLOOD PRESSURE >= 90 MM HG: ICD-10-PCS | Mod: CPTII,S$GLB,, | Performed by: FAMILY MEDICINE

## 2020-06-11 PROCEDURE — 80061 LIPID PANEL: CPT

## 2020-06-11 PROCEDURE — 3075F PR MOST RECENT SYSTOLIC BLOOD PRESS GE 130-139MM HG: ICD-10-PCS | Mod: CPTII,S$GLB,, | Performed by: FAMILY MEDICINE

## 2020-06-11 PROCEDURE — 99999 PR PBB SHADOW E&M-EST. PATIENT-LVL III: CPT | Mod: PBBFAC,,, | Performed by: FAMILY MEDICINE

## 2020-06-11 PROCEDURE — 3080F DIAST BP >= 90 MM HG: CPT | Mod: CPTII,S$GLB,, | Performed by: FAMILY MEDICINE

## 2020-06-11 PROCEDURE — 84153 ASSAY OF PSA TOTAL: CPT

## 2020-06-11 PROCEDURE — 80053 COMPREHEN METABOLIC PANEL: CPT

## 2020-06-11 PROCEDURE — 85025 COMPLETE CBC W/AUTO DIFF WBC: CPT

## 2020-06-11 PROCEDURE — 36415 COLL VENOUS BLD VENIPUNCTURE: CPT | Mod: PO

## 2020-06-11 PROCEDURE — 3075F SYST BP GE 130 - 139MM HG: CPT | Mod: CPTII,S$GLB,, | Performed by: FAMILY MEDICINE

## 2020-06-11 PROCEDURE — 99396 PR PREVENTIVE VISIT,EST,40-64: ICD-10-PCS | Mod: S$GLB,,, | Performed by: FAMILY MEDICINE

## 2020-06-11 PROCEDURE — 99396 PREV VISIT EST AGE 40-64: CPT | Mod: S$GLB,,, | Performed by: FAMILY MEDICINE

## 2020-06-11 RX ORDER — AMLODIPINE BESYLATE 10 MG/1
10 TABLET ORAL DAILY
Qty: 30 TABLET | Refills: 1 | Status: SHIPPED | OUTPATIENT
Start: 2020-06-11 | End: 2020-07-08 | Stop reason: SDUPTHER

## 2020-06-11 NOTE — PROGRESS NOTES
"Subjective:      Patient ID: Jimi Clark is a 49 y.o. male.    Chief Complaint: Annual Exam    HPI  50 yo male with HTN here for annual.  Had likely gout flare up in March/saw Ortho.  No issues now  Wants to change BP meds, feels he urinates too much on current ACE/HCTZ combo  No other issues, doing well overall    Past Medical History:   Diagnosis Date    Back pain     Dr. Arun Ferreira    HTN (hypertension)     Seasonal allergies      Family History   Problem Relation Age of Onset    Alcohol abuse Mother     Stroke Mother     Heart disease Mother     Heart disease Father     Stroke Father     Heart disease Maternal Aunt     Heart attack Maternal Aunt 59    Heart disease Maternal Grandmother     Cancer Maternal Grandfather         prostate     Past Surgical History:   Procedure Laterality Date    achillies tendon tear      COLONOSCOPY N/A 8/7/2018    Procedure: COLONOSCOPY;  Surgeon: Wilber Chavez MD;  Location: [facility];  Service: Endoscopy;  Laterality: N/A;    KNEE ARTHROSCOPY Left 2017    left 3rd finger amputation      TONSILLECTOMY      WISDOM TOOTH EXTRACTION       Social History     Tobacco Use    Smoking status: Never Smoker    Smokeless tobacco: Never Used   Substance Use Topics    Alcohol use: No    Drug use: No       BP (!) 130/90 (BP Location: Right arm, Patient Position: Sitting, BP Method: X-Large (Manual))   Pulse 76   Temp 98.8 °F (37.1 °C) (Oral)   Ht 6' 3" (1.905 m)   Wt 112 kg (246 lb 14.6 oz)   BMI 30.86 kg/m²     Review of Systems   Constitutional: Negative for activity change, appetite change, chills, diaphoresis, fatigue, fever and unexpected weight change.   HENT: Negative for hearing loss and tinnitus.    Eyes: Negative for visual disturbance.   Respiratory: Negative for cough, chest tightness, shortness of breath and wheezing.    Cardiovascular: Negative for chest pain, palpitations and leg swelling.   Gastrointestinal: Negative for abdominal " distention, abdominal pain, constipation and diarrhea.   Genitourinary: Negative for difficulty urinating.   Musculoskeletal: Negative for arthralgias and back pain.   Neurological: Negative for dizziness, weakness and headaches.       Objective:     Physical Exam   Constitutional: He is oriented to person, place, and time. He appears well-developed and well-nourished. No distress.   HENT:   Right Ear: External ear normal.   Left Ear: External ear normal.   Nose: Nose normal.   Mouth/Throat: Oropharynx is clear and moist.   Eyes: Pupils are equal, round, and reactive to light. Conjunctivae are normal.   Neck: Normal range of motion. Neck supple.   Cardiovascular: Normal rate, regular rhythm and normal heart sounds.   No murmur heard.  Pulmonary/Chest: Effort normal and breath sounds normal. No respiratory distress. He has no wheezes.   Abdominal: Soft. Bowel sounds are normal. He exhibits no distension. There is no tenderness. There is no guarding.   Musculoskeletal: He exhibits no edema.   Neurological: He is alert and oriented to person, place, and time. No cranial nerve deficit.   Skin: Skin is warm and dry. No rash noted. He is not diaphoretic.   Psychiatric: He has a normal mood and affect. His behavior is normal. Judgment and thought content normal.   Nursing note and vitals reviewed.      Lab Results   Component Value Date    WBC 7.38 06/20/2018    HGB 14.2 06/20/2018    HCT 42.0 06/20/2018     06/20/2018    CHOL 149 05/14/2018    TRIG 67 05/14/2018    HDL 49 05/14/2018    ALT 14 06/20/2018    AST 20 06/20/2018     06/20/2018    K 4.3 06/20/2018     06/20/2018    CREATININE 1.5 (H) 06/20/2018    BUN 15 06/20/2018    CO2 30 (H) 06/20/2018    TSH 1.337 05/14/2018    PSA 2.1 05/14/2018    HGBA1C 5.3 05/14/2018       Assessment:     1. Annual physical exam         Plan:     Annual physical exam  -     CBC auto differential; Future; Expected date: 06/11/2020  -     Comprehensive metabolic panel;  Future; Expected date: 06/11/2020  -     Hemoglobin A1C; Future; Expected date: 06/11/2020  -     Lipid Panel; Future; Expected date: 06/11/2020  -     TSH; Future; Expected date: 06/11/2020  -     PSA, Screening; Future; Expected date: 06/11/2020    Other orders  -     amLODIPine (NORVASC) 10 MG tablet; Take 1 tablet (10 mg total) by mouth once daily.  Dispense: 30 tablet; Refill: 1    Stop ACE combo  Start norvasc 10mg  F/U 1 mos for Bp read  Update annual labs  Healthy lifestyle

## 2020-06-12 ENCOUNTER — PATIENT MESSAGE (OUTPATIENT)
Dept: INTERNAL MEDICINE | Facility: CLINIC | Age: 50
End: 2020-06-12

## 2020-06-12 DIAGNOSIS — N18.30 STAGE 3 CHRONIC KIDNEY DISEASE: Primary | ICD-10-CM

## 2020-06-12 LAB
COMPLEXED PSA SERPL-MCNC: 1.5 NG/ML (ref 0–4)
ESTIMATED AVG GLUCOSE: 105 MG/DL (ref 68–131)
HBA1C MFR BLD HPLC: 5.3 % (ref 4–5.6)

## 2020-06-24 ENCOUNTER — PATIENT OUTREACH (OUTPATIENT)
Dept: ADMINISTRATIVE | Facility: HOSPITAL | Age: 50
End: 2020-06-24

## 2020-06-24 NOTE — PROGRESS NOTES
HM reviewed and updated. Immunizations abstracted.  Care Everywhere abstracted. CC note updated.  Pt eligible for [employer]. Attempted to call to offer [employer], no answer, left message. Last noted /90.  Previsit chart audit completed.  *KDL*

## 2020-07-01 ENCOUNTER — LAB VISIT (OUTPATIENT)
Dept: LAB | Facility: HOSPITAL | Age: 50
End: 2020-07-01
Attending: FAMILY MEDICINE
Payer: COMMERCIAL

## 2020-07-01 DIAGNOSIS — N18.30 STAGE 3 CHRONIC KIDNEY DISEASE: ICD-10-CM

## 2020-07-01 LAB
ANION GAP SERPL CALC-SCNC: 9 MMOL/L (ref 8–16)
BUN SERPL-MCNC: 11 MG/DL (ref 6–20)
CALCIUM SERPL-MCNC: 9 MG/DL (ref 8.7–10.5)
CHLORIDE SERPL-SCNC: 103 MMOL/L (ref 95–110)
CO2 SERPL-SCNC: 27 MMOL/L (ref 23–29)
CREAT SERPL-MCNC: 1.3 MG/DL (ref 0.5–1.4)
EST. GFR  (AFRICAN AMERICAN): >60 ML/MIN/1.73 M^2
EST. GFR  (NON AFRICAN AMERICAN): >60 ML/MIN/1.73 M^2
GLUCOSE SERPL-MCNC: 98 MG/DL (ref 70–110)
POTASSIUM SERPL-SCNC: 3.4 MMOL/L (ref 3.5–5.1)
SODIUM SERPL-SCNC: 139 MMOL/L (ref 136–145)

## 2020-07-01 PROCEDURE — 80048 BASIC METABOLIC PNL TOTAL CA: CPT

## 2020-07-01 PROCEDURE — 36415 COLL VENOUS BLD VENIPUNCTURE: CPT | Mod: PO

## 2020-07-08 ENCOUNTER — OFFICE VISIT (OUTPATIENT)
Dept: INTERNAL MEDICINE | Facility: CLINIC | Age: 50
End: 2020-07-08
Payer: COMMERCIAL

## 2020-07-08 VITALS
WEIGHT: 252.88 LBS | BODY MASS INDEX: 31.44 KG/M2 | HEIGHT: 75 IN | DIASTOLIC BLOOD PRESSURE: 86 MMHG | TEMPERATURE: 99 F | SYSTOLIC BLOOD PRESSURE: 136 MMHG | RESPIRATION RATE: 16 BRPM | HEART RATE: 80 BPM

## 2020-07-08 DIAGNOSIS — I10 ESSENTIAL HYPERTENSION: Primary | ICD-10-CM

## 2020-07-08 PROCEDURE — 3075F SYST BP GE 130 - 139MM HG: CPT | Mod: CPTII,S$GLB,, | Performed by: NURSE PRACTITIONER

## 2020-07-08 PROCEDURE — 3079F PR MOST RECENT DIASTOLIC BLOOD PRESSURE 80-89 MM HG: ICD-10-PCS | Mod: CPTII,S$GLB,, | Performed by: NURSE PRACTITIONER

## 2020-07-08 PROCEDURE — 3079F DIAST BP 80-89 MM HG: CPT | Mod: CPTII,S$GLB,, | Performed by: NURSE PRACTITIONER

## 2020-07-08 PROCEDURE — 99999 PR PBB SHADOW E&M-EST. PATIENT-LVL III: ICD-10-PCS | Mod: PBBFAC,,, | Performed by: NURSE PRACTITIONER

## 2020-07-08 PROCEDURE — 99999 PR PBB SHADOW E&M-EST. PATIENT-LVL III: CPT | Mod: PBBFAC,,, | Performed by: NURSE PRACTITIONER

## 2020-07-08 PROCEDURE — 3075F PR MOST RECENT SYSTOLIC BLOOD PRESS GE 130-139MM HG: ICD-10-PCS | Mod: CPTII,S$GLB,, | Performed by: NURSE PRACTITIONER

## 2020-07-08 PROCEDURE — 99213 OFFICE O/P EST LOW 20 MIN: CPT | Mod: S$GLB,,, | Performed by: NURSE PRACTITIONER

## 2020-07-08 PROCEDURE — 3008F BODY MASS INDEX DOCD: CPT | Mod: CPTII,S$GLB,, | Performed by: NURSE PRACTITIONER

## 2020-07-08 PROCEDURE — 99213 PR OFFICE/OUTPT VISIT, EST, LEVL III, 20-29 MIN: ICD-10-PCS | Mod: S$GLB,,, | Performed by: NURSE PRACTITIONER

## 2020-07-08 PROCEDURE — 3008F PR BODY MASS INDEX (BMI) DOCUMENTED: ICD-10-PCS | Mod: CPTII,S$GLB,, | Performed by: NURSE PRACTITIONER

## 2020-07-08 RX ORDER — AMLODIPINE BESYLATE 10 MG/1
10 TABLET ORAL DAILY
Qty: 90 TABLET | Refills: 3 | Status: SHIPPED | OUTPATIENT
Start: 2020-07-08 | End: 2021-06-15 | Stop reason: SDUPTHER

## 2020-07-08 NOTE — PROGRESS NOTES
"Subjective:       Patient ID: Jimi Clark is a 49 y.o. male.    Chief Complaint: Follow-up    Patient here for bp follow up  Had annual in June with Dr. 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 Doing well on this med.  Wants to continue amlodipine     Follow-up  Pertinent negatives include no arthralgias, chest pain, chills, diaphoresis, fatigue, fever, headaches, myalgias or rash.       /86 (BP Location: Right arm, Patient Position: Sitting)   Pulse 80   Temp 98.6 °F (37 °C) (Oral)   Resp 16   Ht 6' 3" (1.905 m)   Wt 114.7 kg (252 lb 13.9 oz)   BMI 31.61 kg/m²     Review of Systems   Constitutional: Negative for activity change, appetite change, chills, diaphoresis, fatigue, fever and unexpected weight change.   HENT: Negative.    Eyes: Negative.    Respiratory: Negative for apnea, chest tightness, shortness of breath and stridor.    Cardiovascular: Negative for chest pain, palpitations and leg swelling.   Gastrointestinal: Negative.    Endocrine: Negative.    Genitourinary: Negative.    Musculoskeletal: Negative for arthralgias and myalgias.   Skin: Negative for color change, pallor, rash and wound.   Allergic/Immunologic: Negative.    Neurological: Negative for dizziness, facial asymmetry, light-headedness and headaches.   Hematological: Negative for adenopathy.   Psychiatric/Behavioral: Negative for agitation and behavioral problems.       Objective:      Physical Exam  Vitals signs and nursing note reviewed.   Constitutional:       General: He is not in acute distress.     Appearance: He is well-developed. He is not diaphoretic.   HENT:      Head: Normocephalic and atraumatic.   Cardiovascular:      Rate and Rhythm: Normal rate and regular rhythm.      Heart sounds: Normal heart sounds.   Pulmonary: "      Effort: Pulmonary effort is normal. No respiratory distress.      Breath sounds: Normal breath sounds.   Skin:     General: Skin is warm and dry.      Findings: No rash.   Neurological:      Mental Status: He is alert and oriented to person, place, and time.   Psychiatric:         Behavior: Behavior normal.         Thought Content: Thought content normal.         Judgment: Judgment normal.         Assessment:       1. Essential hypertension        Plan:       Jimi was seen today for follow-up.    Diagnoses and all orders for this visit:    Essential hypertension    Other orders  -     amLODIPine (NORVASC) 10 MG tablet; Take 1 tablet (10 mg total) by mouth once daily.    doing well  Continue ccb  Follow up Dr. ZAMORA June 2021 and prn

## 2020-09-25 ENCOUNTER — PATIENT MESSAGE (OUTPATIENT)
Dept: OTHER | Facility: OTHER | Age: 50
End: 2020-09-25

## 2021-03-31 ENCOUNTER — IMMUNIZATION (OUTPATIENT)
Dept: INTERNAL MEDICINE | Facility: CLINIC | Age: 51
End: 2021-03-31
Payer: COMMERCIAL

## 2021-03-31 DIAGNOSIS — Z23 NEED FOR VACCINATION: Primary | ICD-10-CM

## 2021-03-31 PROCEDURE — 91300 COVID-19, MRNA, LNP-S, PF, 30 MCG/0.3 ML DOSE VACCINE: ICD-10-PCS | Mod: ,,, | Performed by: FAMILY MEDICINE

## 2021-03-31 PROCEDURE — 91300 COVID-19, MRNA, LNP-S, PF, 30 MCG/0.3 ML DOSE VACCINE: CPT | Mod: ,,, | Performed by: FAMILY MEDICINE

## 2021-03-31 PROCEDURE — 0001A COVID-19, MRNA, LNP-S, PF, 30 MCG/0.3 ML DOSE VACCINE: ICD-10-PCS | Mod: CV19,,, | Performed by: FAMILY MEDICINE

## 2021-03-31 PROCEDURE — 0001A COVID-19, MRNA, LNP-S, PF, 30 MCG/0.3 ML DOSE VACCINE: CPT | Mod: CV19,,, | Performed by: FAMILY MEDICINE

## 2021-04-23 ENCOUNTER — IMMUNIZATION (OUTPATIENT)
Dept: INTERNAL MEDICINE | Facility: CLINIC | Age: 51
End: 2021-04-23
Payer: COMMERCIAL

## 2021-04-23 DIAGNOSIS — Z23 NEED FOR VACCINATION: Primary | ICD-10-CM

## 2021-04-23 PROCEDURE — 0002A COVID-19, MRNA, LNP-S, PF, 30 MCG/0.3 ML DOSE VACCINE: CPT | Mod: PBBFAC | Performed by: FAMILY MEDICINE

## 2021-04-23 PROCEDURE — 91300 COVID-19, MRNA, LNP-S, PF, 30 MCG/0.3 ML DOSE VACCINE: CPT | Mod: PBBFAC | Performed by: FAMILY MEDICINE

## 2021-06-15 RX ORDER — AMLODIPINE BESYLATE 10 MG/1
10 TABLET ORAL DAILY
Qty: 90 TABLET | Refills: 0 | Status: SHIPPED | OUTPATIENT
Start: 2021-06-15 | End: 2021-06-16 | Stop reason: SDUPTHER

## 2021-06-16 RX ORDER — AMLODIPINE BESYLATE 10 MG/1
10 TABLET ORAL DAILY
Qty: 90 TABLET | Refills: 0 | Status: SHIPPED | OUTPATIENT
Start: 2021-06-16 | End: 2021-06-18 | Stop reason: SDUPTHER

## 2021-06-18 ENCOUNTER — LAB VISIT (OUTPATIENT)
Dept: LAB | Facility: HOSPITAL | Age: 51
End: 2021-06-18
Attending: FAMILY MEDICINE
Payer: COMMERCIAL

## 2021-06-18 ENCOUNTER — OFFICE VISIT (OUTPATIENT)
Dept: INTERNAL MEDICINE | Facility: CLINIC | Age: 51
End: 2021-06-18
Payer: COMMERCIAL

## 2021-06-18 VITALS
RESPIRATION RATE: 18 BRPM | SYSTOLIC BLOOD PRESSURE: 130 MMHG | DIASTOLIC BLOOD PRESSURE: 86 MMHG | BODY MASS INDEX: 31.58 KG/M2 | WEIGHT: 254 LBS | HEIGHT: 75 IN | HEART RATE: 76 BPM | TEMPERATURE: 98 F

## 2021-06-18 DIAGNOSIS — Z00.00 ANNUAL PHYSICAL EXAM: ICD-10-CM

## 2021-06-18 DIAGNOSIS — Z12.11 SCREEN FOR COLON CANCER: ICD-10-CM

## 2021-06-18 DIAGNOSIS — Z00.00 ANNUAL PHYSICAL EXAM: Primary | ICD-10-CM

## 2021-06-18 DIAGNOSIS — I10 ESSENTIAL HYPERTENSION: ICD-10-CM

## 2021-06-18 LAB
ALBUMIN SERPL BCP-MCNC: 4.1 G/DL (ref 3.5–5.2)
ALP SERPL-CCNC: 64 U/L (ref 55–135)
ALT SERPL W/O P-5'-P-CCNC: 22 U/L (ref 10–44)
ANION GAP SERPL CALC-SCNC: 11 MMOL/L (ref 8–16)
AST SERPL-CCNC: 22 U/L (ref 10–40)
BASOPHILS # BLD AUTO: 0.06 K/UL (ref 0–0.2)
BASOPHILS NFR BLD: 1 % (ref 0–1.9)
BILIRUB SERPL-MCNC: 0.5 MG/DL (ref 0.1–1)
BUN SERPL-MCNC: 11 MG/DL (ref 6–20)
CALCIUM SERPL-MCNC: 9.3 MG/DL (ref 8.7–10.5)
CHLORIDE SERPL-SCNC: 104 MMOL/L (ref 95–110)
CHOLEST SERPL-MCNC: 157 MG/DL (ref 120–199)
CHOLEST/HDLC SERPL: 3 {RATIO} (ref 2–5)
CO2 SERPL-SCNC: 26 MMOL/L (ref 23–29)
COMPLEXED PSA SERPL-MCNC: 1.8 NG/ML (ref 0–4)
CREAT SERPL-MCNC: 1.2 MG/DL (ref 0.5–1.4)
DIFFERENTIAL METHOD: ABNORMAL
EOSINOPHIL # BLD AUTO: 0.5 K/UL (ref 0–0.5)
EOSINOPHIL NFR BLD: 8.5 % (ref 0–8)
ERYTHROCYTE [DISTWIDTH] IN BLOOD BY AUTOMATED COUNT: 13.7 % (ref 11.5–14.5)
EST. GFR  (AFRICAN AMERICAN): >60 ML/MIN/1.73 M^2
EST. GFR  (NON AFRICAN AMERICAN): >60 ML/MIN/1.73 M^2
ESTIMATED AVG GLUCOSE: 108 MG/DL (ref 68–131)
GLUCOSE SERPL-MCNC: 93 MG/DL (ref 70–110)
HBA1C MFR BLD: 5.4 % (ref 4–5.6)
HCT VFR BLD AUTO: 41.8 % (ref 40–54)
HDLC SERPL-MCNC: 52 MG/DL (ref 40–75)
HDLC SERPL: 33.1 % (ref 20–50)
HGB BLD-MCNC: 14 G/DL (ref 14–18)
IMM GRANULOCYTES # BLD AUTO: 0.03 K/UL (ref 0–0.04)
IMM GRANULOCYTES NFR BLD AUTO: 0.5 % (ref 0–0.5)
LDLC SERPL CALC-MCNC: 97.4 MG/DL (ref 63–159)
LYMPHOCYTES # BLD AUTO: 1 K/UL (ref 1–4.8)
LYMPHOCYTES NFR BLD: 16.8 % (ref 18–48)
MCH RBC QN AUTO: 27.7 PG (ref 27–31)
MCHC RBC AUTO-ENTMCNC: 33.5 G/DL (ref 32–36)
MCV RBC AUTO: 83 FL (ref 82–98)
MONOCYTES # BLD AUTO: 0.4 K/UL (ref 0.3–1)
MONOCYTES NFR BLD: 7.4 % (ref 4–15)
NEUTROPHILS # BLD AUTO: 3.9 K/UL (ref 1.8–7.7)
NEUTROPHILS NFR BLD: 65.8 % (ref 38–73)
NONHDLC SERPL-MCNC: 105 MG/DL
NRBC BLD-RTO: 0 /100 WBC
PLATELET # BLD AUTO: 206 K/UL (ref 150–450)
PMV BLD AUTO: 10.8 FL (ref 9.2–12.9)
POTASSIUM SERPL-SCNC: 4 MMOL/L (ref 3.5–5.1)
PROT SERPL-MCNC: 7 G/DL (ref 6–8.4)
RBC # BLD AUTO: 5.06 M/UL (ref 4.6–6.2)
SODIUM SERPL-SCNC: 141 MMOL/L (ref 136–145)
TRIGL SERPL-MCNC: 38 MG/DL (ref 30–150)
TSH SERPL DL<=0.005 MIU/L-ACNC: 1.17 UIU/ML (ref 0.4–4)
WBC # BLD AUTO: 5.91 K/UL (ref 3.9–12.7)

## 2021-06-18 PROCEDURE — 85025 COMPLETE CBC W/AUTO DIFF WBC: CPT | Performed by: NURSE PRACTITIONER

## 2021-06-18 PROCEDURE — 80061 LIPID PANEL: CPT | Performed by: NURSE PRACTITIONER

## 2021-06-18 PROCEDURE — 99999 PR PBB SHADOW E&M-EST. PATIENT-LVL IV: CPT | Mod: PBBFAC,,, | Performed by: NURSE PRACTITIONER

## 2021-06-18 PROCEDURE — 84153 ASSAY OF PSA TOTAL: CPT | Performed by: NURSE PRACTITIONER

## 2021-06-18 PROCEDURE — 83036 HEMOGLOBIN GLYCOSYLATED A1C: CPT | Performed by: NURSE PRACTITIONER

## 2021-06-18 PROCEDURE — 3008F PR BODY MASS INDEX (BMI) DOCUMENTED: ICD-10-PCS | Mod: CPTII,S$GLB,, | Performed by: NURSE PRACTITIONER

## 2021-06-18 PROCEDURE — 86803 HEPATITIS C AB TEST: CPT | Performed by: NURSE PRACTITIONER

## 2021-06-18 PROCEDURE — 84443 ASSAY THYROID STIM HORMONE: CPT | Performed by: NURSE PRACTITIONER

## 2021-06-18 PROCEDURE — 99396 PR PREVENTIVE VISIT,EST,40-64: ICD-10-PCS | Mod: S$GLB,,, | Performed by: NURSE PRACTITIONER

## 2021-06-18 PROCEDURE — 1126F PR PAIN SEVERITY QUANTIFIED, NO PAIN PRESENT: ICD-10-PCS | Mod: S$GLB,,, | Performed by: NURSE PRACTITIONER

## 2021-06-18 PROCEDURE — 99396 PREV VISIT EST AGE 40-64: CPT | Mod: S$GLB,,, | Performed by: NURSE PRACTITIONER

## 2021-06-18 PROCEDURE — 80053 COMPREHEN METABOLIC PANEL: CPT | Performed by: NURSE PRACTITIONER

## 2021-06-18 PROCEDURE — 1126F AMNT PAIN NOTED NONE PRSNT: CPT | Mod: S$GLB,,, | Performed by: NURSE PRACTITIONER

## 2021-06-18 PROCEDURE — 99999 PR PBB SHADOW E&M-EST. PATIENT-LVL IV: ICD-10-PCS | Mod: PBBFAC,,, | Performed by: NURSE PRACTITIONER

## 2021-06-18 PROCEDURE — 3008F BODY MASS INDEX DOCD: CPT | Mod: CPTII,S$GLB,, | Performed by: NURSE PRACTITIONER

## 2021-06-18 PROCEDURE — 36415 COLL VENOUS BLD VENIPUNCTURE: CPT | Mod: PO | Performed by: NURSE PRACTITIONER

## 2021-06-18 RX ORDER — KETOCONAZOLE 20 MG/ML
SHAMPOO, SUSPENSION TOPICAL
COMMUNITY
Start: 2021-06-10

## 2021-06-18 RX ORDER — NEOMYCIN SULFATE, POLYMYXIN B SULFATE, AND DEXAMETHASONE 3.5; 10000; 1 MG/G; [USP'U]/G; MG/G
OINTMENT OPHTHALMIC
COMMUNITY
Start: 2020-12-23

## 2021-06-18 RX ORDER — AMLODIPINE BESYLATE 10 MG/1
10 TABLET ORAL DAILY
Qty: 90 TABLET | Refills: 3 | Status: SHIPPED | OUTPATIENT
Start: 2021-06-18 | End: 2021-06-27 | Stop reason: SDUPTHER

## 2021-06-21 ENCOUNTER — PATIENT MESSAGE (OUTPATIENT)
Dept: ENDOSCOPY | Facility: HOSPITAL | Age: 51
End: 2021-06-21

## 2021-06-21 LAB — HCV AB SERPL QL IA: NEGATIVE

## 2021-06-28 RX ORDER — AMLODIPINE BESYLATE 10 MG/1
10 TABLET ORAL DAILY
Qty: 90 TABLET | Refills: 3 | Status: SHIPPED | OUTPATIENT
Start: 2021-06-28 | End: 2021-06-28 | Stop reason: SDUPTHER

## 2021-06-29 RX ORDER — AMLODIPINE BESYLATE 10 MG/1
10 TABLET ORAL DAILY
Qty: 90 TABLET | Refills: 1 | Status: SHIPPED | OUTPATIENT
Start: 2021-06-29 | End: 2022-08-02 | Stop reason: SDUPTHER

## 2021-10-22 ENCOUNTER — TELEPHONE (OUTPATIENT)
Dept: INTERNAL MEDICINE | Facility: CLINIC | Age: 51
End: 2021-10-22

## 2021-12-14 ENCOUNTER — IMMUNIZATION (OUTPATIENT)
Dept: PRIMARY CARE CLINIC | Facility: CLINIC | Age: 51
End: 2021-12-14
Payer: COMMERCIAL

## 2021-12-14 DIAGNOSIS — Z23 NEED FOR VACCINATION: Primary | ICD-10-CM

## 2021-12-14 PROCEDURE — 0004A COVID-19, MRNA, LNP-S, PF, 30 MCG/0.3 ML DOSE VACCINE: CPT | Mod: CV19,PBBFAC | Performed by: FAMILY MEDICINE

## 2022-04-05 ENCOUNTER — OFFICE VISIT (OUTPATIENT)
Dept: INTERNAL MEDICINE | Facility: CLINIC | Age: 52
End: 2022-04-05
Payer: COMMERCIAL

## 2022-04-05 VITALS
SYSTOLIC BLOOD PRESSURE: 139 MMHG | HEART RATE: 87 BPM | WEIGHT: 250.88 LBS | TEMPERATURE: 98 F | OXYGEN SATURATION: 97 % | BODY MASS INDEX: 31.19 KG/M2 | DIASTOLIC BLOOD PRESSURE: 89 MMHG | HEIGHT: 75 IN

## 2022-04-05 DIAGNOSIS — R50.9 FEVER, UNSPECIFIED FEVER CAUSE: Primary | ICD-10-CM

## 2022-04-05 DIAGNOSIS — J11.1 FLU: ICD-10-CM

## 2022-04-05 DIAGNOSIS — R05.9 COUGH: ICD-10-CM

## 2022-04-05 LAB
CTP QC/QA: YES
CTP QC/QA: YES
POC MOLECULAR INFLUENZA A AGN: POSITIVE
POC MOLECULAR INFLUENZA B AGN: NEGATIVE
SARS-COV-2 RDRP RESP QL NAA+PROBE: NEGATIVE

## 2022-04-05 PROCEDURE — 3008F BODY MASS INDEX DOCD: CPT | Mod: CPTII,S$GLB,, | Performed by: FAMILY MEDICINE

## 2022-04-05 PROCEDURE — 87502 POCT INFLUENZA A/B MOLECULAR: ICD-10-PCS | Mod: QW,S$GLB,, | Performed by: FAMILY MEDICINE

## 2022-04-05 PROCEDURE — 99213 PR OFFICE/OUTPT VISIT, EST, LEVL III, 20-29 MIN: ICD-10-PCS | Mod: S$GLB,,, | Performed by: FAMILY MEDICINE

## 2022-04-05 PROCEDURE — 3079F DIAST BP 80-89 MM HG: CPT | Mod: CPTII,S$GLB,, | Performed by: FAMILY MEDICINE

## 2022-04-05 PROCEDURE — 1160F RVW MEDS BY RX/DR IN RCRD: CPT | Mod: CPTII,S$GLB,, | Performed by: FAMILY MEDICINE

## 2022-04-05 PROCEDURE — U0002: ICD-10-PCS | Mod: QW,S$GLB,, | Performed by: FAMILY MEDICINE

## 2022-04-05 PROCEDURE — 87502 INFLUENZA DNA AMP PROBE: CPT | Mod: QW,S$GLB,, | Performed by: FAMILY MEDICINE

## 2022-04-05 PROCEDURE — 3075F PR MOST RECENT SYSTOLIC BLOOD PRESS GE 130-139MM HG: ICD-10-PCS | Mod: CPTII,S$GLB,, | Performed by: FAMILY MEDICINE

## 2022-04-05 PROCEDURE — 3079F PR MOST RECENT DIASTOLIC BLOOD PRESSURE 80-89 MM HG: ICD-10-PCS | Mod: CPTII,S$GLB,, | Performed by: FAMILY MEDICINE

## 2022-04-05 PROCEDURE — 99213 OFFICE O/P EST LOW 20 MIN: CPT | Mod: S$GLB,,, | Performed by: FAMILY MEDICINE

## 2022-04-05 PROCEDURE — 3075F SYST BP GE 130 - 139MM HG: CPT | Mod: CPTII,S$GLB,, | Performed by: FAMILY MEDICINE

## 2022-04-05 PROCEDURE — 1159F MED LIST DOCD IN RCRD: CPT | Mod: CPTII,S$GLB,, | Performed by: FAMILY MEDICINE

## 2022-04-05 PROCEDURE — 1160F PR REVIEW ALL MEDS BY PRESCRIBER/CLIN PHARMACIST DOCUMENTED: ICD-10-PCS | Mod: CPTII,S$GLB,, | Performed by: FAMILY MEDICINE

## 2022-04-05 PROCEDURE — 99999 PR PBB SHADOW E&M-EST. PATIENT-LVL IV: CPT | Mod: PBBFAC,,, | Performed by: FAMILY MEDICINE

## 2022-04-05 PROCEDURE — 1159F PR MEDICATION LIST DOCUMENTED IN MEDICAL RECORD: ICD-10-PCS | Mod: CPTII,S$GLB,, | Performed by: FAMILY MEDICINE

## 2022-04-05 PROCEDURE — 99999 PR PBB SHADOW E&M-EST. PATIENT-LVL IV: ICD-10-PCS | Mod: PBBFAC,,, | Performed by: FAMILY MEDICINE

## 2022-04-05 PROCEDURE — U0002 COVID-19 LAB TEST NON-CDC: HCPCS | Mod: QW,S$GLB,, | Performed by: FAMILY MEDICINE

## 2022-04-05 PROCEDURE — 3008F PR BODY MASS INDEX (BMI) DOCUMENTED: ICD-10-PCS | Mod: CPTII,S$GLB,, | Performed by: FAMILY MEDICINE

## 2022-04-05 RX ORDER — GABAPENTIN 600 MG/1
TABLET, FILM COATED ORAL
COMMUNITY
Start: 2022-02-24

## 2022-04-05 RX ORDER — OSELTAMIVIR PHOSPHATE 75 MG/1
75 CAPSULE ORAL 2 TIMES DAILY
Qty: 10 CAPSULE | Refills: 0 | Status: SHIPPED | OUTPATIENT
Start: 2022-04-05 | End: 2022-04-10

## 2022-04-05 RX ORDER — SILDENAFIL 25 MG/1
TABLET, FILM COATED ORAL
COMMUNITY
Start: 2022-03-29 | End: 2022-08-02

## 2022-04-05 RX ORDER — PROMETHAZINE HYDROCHLORIDE AND DEXTROMETHORPHAN HYDROBROMIDE 6.25; 15 MG/5ML; MG/5ML
5 SYRUP ORAL EVERY 6 HOURS PRN
Qty: 180 ML | Refills: 0 | Status: SHIPPED | OUTPATIENT
Start: 2022-04-05 | End: 2022-04-15

## 2022-04-05 RX ORDER — CELECOXIB 200 MG/1
200 CAPSULE ORAL 2 TIMES DAILY
COMMUNITY
Start: 2022-02-23

## 2022-04-05 RX ORDER — CYCLOBENZAPRINE HCL 10 MG
10 TABLET ORAL DAILY PRN
COMMUNITY
Start: 2022-02-23 | End: 2023-11-17

## 2022-04-05 NOTE — PROGRESS NOTES
"Subjective:      Patient ID: Jimi Clark is a 51 y.o. male.    Chief Complaint: Generalized Body Aches, Sinus Problem, Cough, Headache, and Fever    HPI 51 y.o.   male patient with a PMHx of hypertension and seasonal allergies presents to clinic for annual exam. The patient was last seen on June 11, 2020      Today he reports that he is not feeling well. He endorses generalized body aches with associated cough, fever, headache and sinus pressure.     Patient states that he did not get his flu vaccination but he did have his COVID vaccination.     He otherwise has no complaints.       Past Medical History:   Diagnosis Date    Back pain     Dr. Arun Ferreira    HTN (hypertension)     Seasonal allergies      Family History   Problem Relation Age of Onset    Alcohol abuse Mother     Stroke Mother     Heart disease Mother     Heart disease Father     Stroke Father     Heart disease Maternal Aunt     Heart attack Maternal Aunt 59    Heart disease Maternal Grandmother     Cancer Maternal Grandfather         prostate     Past Surgical History:   Procedure Laterality Date    achillies tendon tear      COLONOSCOPY N/A 8/7/2018    Procedure: COLONOSCOPY;  Surgeon: Wilber Chavez MD;  Location: [facility];  Service: Endoscopy;  Laterality: N/A;    KNEE ARTHROSCOPY Left 2017    left 3rd finger amputation      TONSILLECTOMY      WISDOM TOOTH EXTRACTION       Social History     Tobacco Use    Smoking status: Never Smoker    Smokeless tobacco: Never Used   Substance Use Topics    Alcohol use: No    Drug use: No       /89   Pulse 87   Temp 98 °F (36.7 °C)   Ht 6' 3" (1.905 m)   Wt 113.8 kg (250 lb 14.1 oz)   SpO2 97%   BMI 31.36 kg/m²     Review of Systems   Constitutional: Positive for fever. Negative for activity change, appetite change, chills, diaphoresis, fatigue and unexpected weight change.   HENT: Positive for sinus pressure and sinus pain. Negative for hearing loss and tinnitus.  "   Eyes: Negative for visual disturbance.   Respiratory: Positive for cough. Negative for shortness of breath and wheezing.    Cardiovascular: Negative for chest pain, palpitations and leg swelling.   Gastrointestinal: Negative for abdominal distention, abdominal pain, constipation and diarrhea.   Genitourinary: Negative for dysuria, frequency, hematuria and urgency.   Musculoskeletal: Positive for arthralgias. Negative for back pain and joint swelling.   Skin: Negative for color change and rash.   Neurological: Positive for headaches. Negative for weakness.   Hematological: Negative for adenopathy.   Psychiatric/Behavioral: Negative for confusion and decreased concentration.       Objective:     Physical Exam  Vitals and nursing note reviewed.   Constitutional:       General: He is not in acute distress.  HENT:      Right Ear: External ear normal.      Left Ear: External ear normal.      Nose: Nose normal.   Eyes:      Conjunctiva/sclera: Conjunctivae normal.      Pupils: Pupils are equal, round, and reactive to light.   Neck:      Vascular: No carotid bruit.   Cardiovascular:      Rate and Rhythm: Normal rate and regular rhythm.      Heart sounds: Normal heart sounds.   Pulmonary:      Effort: Pulmonary effort is normal. No respiratory distress.      Breath sounds: Normal breath sounds. No wheezing or rales.   Abdominal:      General: Bowel sounds are normal. There is no distension.      Palpations: Abdomen is soft.      Tenderness: There is no abdominal tenderness. There is no guarding.   Musculoskeletal:      Cervical back: Normal range of motion and neck supple.      Right lower leg: No edema.      Left lower leg: No edema.   Skin:     General: Skin is warm and dry.      Findings: No rash.   Neurological:      Mental Status: He is alert and oriented to person, place, and time.   Psychiatric:         Behavior: Behavior normal.         Thought Content: Thought content normal.         Judgment: Judgment normal.          Lab Results   Component Value Date    WBC 5.91 06/18/2021    HGB 14.0 06/18/2021    HCT 41.8 06/18/2021     06/18/2021    CHOL 157 06/18/2021    TRIG 38 06/18/2021    HDL 52 06/18/2021    ALT 22 06/18/2021    AST 22 06/18/2021     06/18/2021    K 4.0 06/18/2021     06/18/2021    CREATININE 1.2 06/18/2021    BUN 11 06/18/2021    CO2 26 06/18/2021    TSH 1.169 06/18/2021    PSA 1.8 06/18/2021    HGBA1C 5.4 06/18/2021       Assessment:     1. Fever, unspecified fever cause    2. Cough    3. Flu         Plan:     Fever, unspecified fever cause  -     POCT Influenza A/B Molecular  -     POCT COVID-19 Rapid Screening    Cough    Flu    Other orders  -     oseltamivir (TAMIFLU) 75 MG capsule; Take 1 capsule (75 mg total) by mouth 2 (two) times daily. for 5 days  Dispense: 10 capsule; Refill: 0  -     promethazine-dextromethorphan (PROMETHAZINE-DM) 6.25-15 mg/5 mL Syrp; Take 5 mLs by mouth every 6 (six) hours as needed (cough).  Dispense: 180 mL; Refill: 0        Vitals reviewed.     Patient tested for COVID and Flu.   ------  Patient Influenza A positive. Patient will begin on Tamaflu. We will also treat cough with cough suppressant. He is to drink plenty of fluids and rest.   Note to return to work on Monday    Questions and concerns addressed.          Documentation entered by Flako Lancaster, acting as scribe for Dr. Mack Bauman. 04/05/2022 10:26 AM.

## 2022-05-13 ENCOUNTER — OFFICE VISIT (OUTPATIENT)
Dept: INTERNAL MEDICINE | Facility: CLINIC | Age: 52
End: 2022-05-13
Payer: COMMERCIAL

## 2022-05-13 VITALS
HEART RATE: 88 BPM | WEIGHT: 241 LBS | DIASTOLIC BLOOD PRESSURE: 88 MMHG | SYSTOLIC BLOOD PRESSURE: 128 MMHG | TEMPERATURE: 97 F | BODY MASS INDEX: 29.97 KG/M2 | HEIGHT: 75 IN

## 2022-05-13 DIAGNOSIS — S80.811D ABRASION OF RIGHT LOWER EXTREMITY, SUBSEQUENT ENCOUNTER: ICD-10-CM

## 2022-05-13 DIAGNOSIS — Z12.11 SCREEN FOR COLON CANCER: ICD-10-CM

## 2022-05-13 DIAGNOSIS — E66.1 CLASS 1 DRUG-INDUCED OBESITY WITH SERIOUS COMORBIDITY AND BODY MASS INDEX (BMI) OF 30.0 TO 30.9 IN ADULT: ICD-10-CM

## 2022-05-13 DIAGNOSIS — M79.661 PAIN AND SWELLING OF RIGHT LOWER LEG: ICD-10-CM

## 2022-05-13 DIAGNOSIS — M79.89 PAIN AND SWELLING OF RIGHT LOWER LEG: ICD-10-CM

## 2022-05-13 DIAGNOSIS — S89.91XD INJURY OF RIGHT LOWER EXTREMITY, SUBSEQUENT ENCOUNTER: Primary | ICD-10-CM

## 2022-05-13 DIAGNOSIS — I10 ESSENTIAL HYPERTENSION: ICD-10-CM

## 2022-05-13 PROCEDURE — 3008F BODY MASS INDEX DOCD: CPT | Mod: CPTII,S$GLB,, | Performed by: NURSE PRACTITIONER

## 2022-05-13 PROCEDURE — 3079F DIAST BP 80-89 MM HG: CPT | Mod: CPTII,S$GLB,, | Performed by: NURSE PRACTITIONER

## 2022-05-13 PROCEDURE — 1159F MED LIST DOCD IN RCRD: CPT | Mod: CPTII,S$GLB,, | Performed by: NURSE PRACTITIONER

## 2022-05-13 PROCEDURE — 3074F PR MOST RECENT SYSTOLIC BLOOD PRESSURE < 130 MM HG: ICD-10-PCS | Mod: CPTII,S$GLB,, | Performed by: NURSE PRACTITIONER

## 2022-05-13 PROCEDURE — 3074F SYST BP LT 130 MM HG: CPT | Mod: CPTII,S$GLB,, | Performed by: NURSE PRACTITIONER

## 2022-05-13 PROCEDURE — 1160F PR REVIEW ALL MEDS BY PRESCRIBER/CLIN PHARMACIST DOCUMENTED: ICD-10-PCS | Mod: CPTII,S$GLB,, | Performed by: NURSE PRACTITIONER

## 2022-05-13 PROCEDURE — 99999 PR PBB SHADOW E&M-EST. PATIENT-LVL IV: CPT | Mod: PBBFAC,,, | Performed by: NURSE PRACTITIONER

## 2022-05-13 PROCEDURE — 99999 PR PBB SHADOW E&M-EST. PATIENT-LVL IV: ICD-10-PCS | Mod: PBBFAC,,, | Performed by: NURSE PRACTITIONER

## 2022-05-13 PROCEDURE — 99214 OFFICE O/P EST MOD 30 MIN: CPT | Mod: S$GLB,,, | Performed by: NURSE PRACTITIONER

## 2022-05-13 PROCEDURE — 1159F PR MEDICATION LIST DOCUMENTED IN MEDICAL RECORD: ICD-10-PCS | Mod: CPTII,S$GLB,, | Performed by: NURSE PRACTITIONER

## 2022-05-13 PROCEDURE — 99214 PR OFFICE/OUTPT VISIT, EST, LEVL IV, 30-39 MIN: ICD-10-PCS | Mod: S$GLB,,, | Performed by: NURSE PRACTITIONER

## 2022-05-13 PROCEDURE — 1160F RVW MEDS BY RX/DR IN RCRD: CPT | Mod: CPTII,S$GLB,, | Performed by: NURSE PRACTITIONER

## 2022-05-13 PROCEDURE — 3008F PR BODY MASS INDEX (BMI) DOCUMENTED: ICD-10-PCS | Mod: CPTII,S$GLB,, | Performed by: NURSE PRACTITIONER

## 2022-05-13 PROCEDURE — 3079F PR MOST RECENT DIASTOLIC BLOOD PRESSURE 80-89 MM HG: ICD-10-PCS | Mod: CPTII,S$GLB,, | Performed by: NURSE PRACTITIONER

## 2022-05-13 NOTE — PROGRESS NOTES
"Subjective:       Patient ID: Jimi Clark is a 51 y.o. male.    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 Negative for appetite change, chills, diaphoresis, fever and unexpected weight change.   HENT: Negative for congestion, ear pain, nosebleeds, postnasal drip, rhinorrhea, sinus pressure, sneezing, sore throat and trouble swallowing.    Eyes: Negative for photophobia, pain and visual disturbance.   Respiratory: Negative for apnea, cough, choking, chest tightness, shortness of breath and wheezing.    Cardiovascular: Negative for chest pain, palpitations and leg swelling.   Gastrointestinal: Negative for abdominal pain, blood in stool, constipation, diarrhea, nausea and vomiting.   Genitourinary: Negative for decreased urine volume, difficulty urinating, dysuria, hematuria and urgency.   Musculoskeletal: Positive for gait problem, joint swelling and myalgias. Negative for arthralgias.   Skin: Positive for color change and wound. Negative for rash.   Neurological: Negative for dizziness, tremors, seizures, syncope, weakness, light-headedness, numbness and headaches.   Psychiatric/Behavioral: Negative for " agitation, confusion, decreased concentration, hallucinations and sleep disturbance. The patient is not nervous/anxious.        Objective:      Physical Exam  Vitals and nursing note reviewed.   Constitutional:       General: He is not in acute distress.     Appearance: He is well-developed. He is not diaphoretic.   HENT:      Head: Normocephalic and atraumatic.   Eyes:      General:         Right eye: No discharge.         Left eye: No discharge.      Conjunctiva/sclera: Conjunctivae normal.   Cardiovascular:      Rate and Rhythm: Normal rate and regular rhythm.      Heart sounds: Normal heart sounds. No murmur heard.  Pulmonary:      Effort: Pulmonary effort is normal. No respiratory distress.      Breath sounds: Normal breath sounds. No wheezing or rales.   Chest:      Chest wall: No tenderness.   Abdominal:      General: There is no distension.      Palpations: Abdomen is soft.   Musculoskeletal:         General: Normal range of motion.      Comments: Right lower leg with swelling, skin is shiny/tight due to swelling to shin and calf area. Patient limping today, has crutches, there is an open abrasion with exposed tissue to anterior shin area. No drainage. The calf and posterior lower leg with exquisite tenderness, swelling.    Skin:     General: Skin is warm and dry.      Findings: No rash.   Neurological:      Mental Status: He is alert and oriented to person, place, and time.   Psychiatric:         Behavior: Behavior normal. Behavior is cooperative.         Thought Content: Thought content normal.         Judgment: Judgment normal.         Assessment:       1. Injury of right lower extremity, subsequent encounter    2. Abrasion of right lower extremity, subsequent encounter    3. Pain and swelling of right lower leg    4. Essential hypertension    5. Class 1 drug-induced obesity with serious comorbidity and body mass index (BMI) of 30.0 to 30.9 in adult    6. Screen for colon cancer        Plan:        Jimi was seen today for leg swelling.    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 as he needs time to be off of his leg in order for it to heal  Off work for now and plan to return Monday 5/30 with no restrictions  Patient to follow up with me on Friday 5/27 for leg check prior to returning to work  [employer] forms filled out today    Essential hypertension  Stable on amlodipine    Class 1 drug-induced obesity with serious comorbidity and body mass index (BMI) of 30.0 to 30.9 in adult  Stable; advise lifestyle/diet changes  Wt Readings from Last 10 Encounters:   05/13/22 109.3 kg (241 lb)   04/05/22 113.8 kg (250 lb 14.1 oz)   06/18/21 115.2 kg (253 lb 15.5 oz)   07/08/20 114.7 kg (252 lb 13.9 oz)   06/11/20 112 kg (246 lb 14.6 oz)   08/07/18 105.7 kg (233 lb)   07/02/18 106.8 kg (235 lb 7.2 oz)   06/20/18 109.4 kg (241 lb 2.9 oz)   05/23/18 110.2 kg (242 lb 15.2 oz)   05/17/18 111.9 kg (246 lb 11.1 oz)       Screen for colon cancer  -     Ambulatory referral/consult to Endo Procedure ; Future      Annual due in June

## 2022-05-27 ENCOUNTER — OFFICE VISIT (OUTPATIENT)
Dept: INTERNAL MEDICINE | Facility: CLINIC | Age: 52
End: 2022-05-27
Payer: COMMERCIAL

## 2022-05-27 VITALS
HEART RATE: 84 BPM | BODY MASS INDEX: 29.97 KG/M2 | HEIGHT: 75 IN | WEIGHT: 241 LBS | DIASTOLIC BLOOD PRESSURE: 88 MMHG | SYSTOLIC BLOOD PRESSURE: 138 MMHG | TEMPERATURE: 97 F

## 2022-05-27 DIAGNOSIS — S89.91XD INJURY OF RIGHT LOWER EXTREMITY, SUBSEQUENT ENCOUNTER: Primary | ICD-10-CM

## 2022-05-27 DIAGNOSIS — S80.811D ABRASION OF RIGHT LOWER EXTREMITY, SUBSEQUENT ENCOUNTER: ICD-10-CM

## 2022-05-27 PROCEDURE — 3079F PR MOST RECENT DIASTOLIC BLOOD PRESSURE 80-89 MM HG: ICD-10-PCS | Mod: CPTII,S$GLB,, | Performed by: NURSE PRACTITIONER

## 2022-05-27 PROCEDURE — 3008F PR BODY MASS INDEX (BMI) DOCUMENTED: ICD-10-PCS | Mod: CPTII,S$GLB,, | Performed by: NURSE PRACTITIONER

## 2022-05-27 PROCEDURE — 99213 PR OFFICE/OUTPT VISIT, EST, LEVL III, 20-29 MIN: ICD-10-PCS | Mod: S$GLB,,, | Performed by: NURSE PRACTITIONER

## 2022-05-27 PROCEDURE — 3008F BODY MASS INDEX DOCD: CPT | Mod: CPTII,S$GLB,, | Performed by: NURSE PRACTITIONER

## 2022-05-27 PROCEDURE — 3075F SYST BP GE 130 - 139MM HG: CPT | Mod: CPTII,S$GLB,, | Performed by: NURSE PRACTITIONER

## 2022-05-27 PROCEDURE — 1160F PR REVIEW ALL MEDS BY PRESCRIBER/CLIN PHARMACIST DOCUMENTED: ICD-10-PCS | Mod: CPTII,S$GLB,, | Performed by: NURSE PRACTITIONER

## 2022-05-27 PROCEDURE — 1160F RVW MEDS BY RX/DR IN RCRD: CPT | Mod: CPTII,S$GLB,, | Performed by: NURSE PRACTITIONER

## 2022-05-27 PROCEDURE — 1159F PR MEDICATION LIST DOCUMENTED IN MEDICAL RECORD: ICD-10-PCS | Mod: CPTII,S$GLB,, | Performed by: NURSE PRACTITIONER

## 2022-05-27 PROCEDURE — 99999 PR PBB SHADOW E&M-EST. PATIENT-LVL IV: ICD-10-PCS | Mod: PBBFAC,,, | Performed by: NURSE PRACTITIONER

## 2022-05-27 PROCEDURE — 3079F DIAST BP 80-89 MM HG: CPT | Mod: CPTII,S$GLB,, | Performed by: NURSE PRACTITIONER

## 2022-05-27 PROCEDURE — 99999 PR PBB SHADOW E&M-EST. PATIENT-LVL IV: CPT | Mod: PBBFAC,,, | Performed by: NURSE PRACTITIONER

## 2022-05-27 PROCEDURE — 3075F PR MOST RECENT SYSTOLIC BLOOD PRESS GE 130-139MM HG: ICD-10-PCS | Mod: CPTII,S$GLB,, | Performed by: NURSE PRACTITIONER

## 2022-05-27 PROCEDURE — 1159F MED LIST DOCD IN RCRD: CPT | Mod: CPTII,S$GLB,, | Performed by: NURSE PRACTITIONER

## 2022-05-27 PROCEDURE — 99213 OFFICE O/P EST LOW 20 MIN: CPT | Mod: S$GLB,,, | Performed by: NURSE PRACTITIONER

## 2022-05-27 NOTE — PROGRESS NOTES
"Subjective:       Patient ID: Jimi Clark is a 51 y.o. male.    Chief Complaint: Wound Check    Patient here for wound check for right leg wound  Swelling is improving gradually  Bruising still present  Leg is less tight  Wound healing - doing wound care, antibacterial soap and antibiotic ointment  Still using crutches  Does not feel that he can return to work monday    Wound Check        /88   Pulse 84   Temp 97.4 °F (36.3 °C)   Ht 6' 3" (1.905 m)   Wt 109.3 kg (241 lb)   BMI 30.12 kg/m²     Review of Systems   Constitutional: Negative for activity change, appetite change, chills, diaphoresis, fatigue, fever and unexpected weight change.   HENT: Negative.    Eyes: Negative.    Respiratory: Negative for apnea, chest tightness, shortness of breath and stridor.    Cardiovascular: Negative for chest pain, palpitations and leg swelling.   Gastrointestinal: Negative.    Endocrine: Negative.    Genitourinary: Negative.    Musculoskeletal: Positive for arthralgias, gait problem and myalgias.   Skin: Positive for color change and wound. Negative for pallor and rash.   Allergic/Immunologic: Negative.    Neurological: Negative for dizziness, facial asymmetry, light-headedness and headaches.   Hematological: Negative for adenopathy.   Psychiatric/Behavioral: Negative for agitation and behavioral problems.       Objective:      Physical Exam  Vitals and nursing note reviewed.   Constitutional:       General: He is not in acute distress.     Appearance: He is well-developed. He is not diaphoretic.   HENT:      Head: Normocephalic and atraumatic.   Eyes:      General:         Right eye: No discharge.         Left eye: No discharge.      Conjunctiva/sclera: Conjunctivae normal.   Cardiovascular:      Rate and Rhythm: Normal rate and regular rhythm.      Heart sounds: Normal heart sounds. No murmur heard.  Pulmonary:      Effort: Pulmonary effort is normal. No respiratory distress.      Breath sounds: Normal " breath sounds. No wheezing or rales.   Chest:      Chest wall: No tenderness.   Abdominal:      General: There is no distension.      Palpations: Abdomen is soft.   Musculoskeletal:         General: Normal range of motion.      Comments: Right lower leg with mild swelling,much improved since last visit. Patient limping today, has crutches, abrasion to shin healing well. No drainage. Tenderness noted throughout shin and posterior calf area   Skin:     General: Skin is warm and dry.      Findings: No rash.   Neurological:      Mental Status: He is alert and oriented to person, place, and time.   Psychiatric:         Behavior: Behavior normal. Behavior is cooperative.         Thought Content: Thought content normal.         Judgment: Judgment normal.         Assessment:       1. Injury of right lower extremity, subsequent encounter    2. Abrasion of right lower extremity, subsequent encounter        Plan:       Jimi was seen today for wound check.    Diagnoses and all orders for this visit:    Injury of right lower extremity, subsequent encounter    Abrasion of right lower extremity, subsequent encounter    will extend leave for 1 more week  Follow up next Friday for wound check  Plan to return to work 6/6

## 2022-05-30 ENCOUNTER — HOSPITAL ENCOUNTER (OUTPATIENT)
Dept: PREADMISSION TESTING | Facility: HOSPITAL | Age: 52
Discharge: HOME | End: 2022-05-30
Attending: FAMILY MEDICINE
Payer: COMMERCIAL

## 2022-05-30 DIAGNOSIS — Z12.11 SCREEN FOR COLON CANCER: Primary | ICD-10-CM

## 2022-05-31 RX ORDER — POLYETHYLENE GLYCOL 3350, SODIUM SULFATE ANHYDROUS, SODIUM BICARBONATE, SODIUM CHLORIDE, POTASSIUM CHLORIDE 236; 22.74; 6.74; 5.86; 2.97 G/4L; G/4L; G/4L; G/4L; G/4L
4 POWDER, FOR SOLUTION ORAL ONCE
Qty: 4000 ML | Refills: 0 | Status: SHIPPED | OUTPATIENT
Start: 2022-05-31 | End: 2022-05-31

## 2022-06-03 ENCOUNTER — PATIENT MESSAGE (OUTPATIENT)
Dept: INTERNAL MEDICINE | Facility: CLINIC | Age: 52
End: 2022-06-03

## 2022-06-03 ENCOUNTER — OFFICE VISIT (OUTPATIENT)
Dept: INTERNAL MEDICINE | Facility: CLINIC | Age: 52
End: 2022-06-03
Payer: COMMERCIAL

## 2022-06-03 ENCOUNTER — LAB VISIT (OUTPATIENT)
Dept: LAB | Facility: HOSPITAL | Age: 52
End: 2022-06-03
Attending: NURSE PRACTITIONER
Payer: COMMERCIAL

## 2022-06-03 VITALS
BODY MASS INDEX: 30.29 KG/M2 | SYSTOLIC BLOOD PRESSURE: 126 MMHG | HEIGHT: 75 IN | HEART RATE: 86 BPM | WEIGHT: 243.63 LBS | TEMPERATURE: 98 F | DIASTOLIC BLOOD PRESSURE: 80 MMHG

## 2022-06-03 DIAGNOSIS — S80.811D ABRASION OF RIGHT LOWER EXTREMITY, SUBSEQUENT ENCOUNTER: ICD-10-CM

## 2022-06-03 DIAGNOSIS — S89.91XD INJURY OF RIGHT LOWER EXTREMITY, SUBSEQUENT ENCOUNTER: Primary | ICD-10-CM

## 2022-06-03 DIAGNOSIS — Z29.9 PREVENTIVE MEASURE: ICD-10-CM

## 2022-06-03 DIAGNOSIS — R97.20 ELEVATED PSA: Primary | ICD-10-CM

## 2022-06-03 DIAGNOSIS — M79.661 PAIN AND SWELLING OF RIGHT LOWER LEG: ICD-10-CM

## 2022-06-03 DIAGNOSIS — M79.89 PAIN AND SWELLING OF RIGHT LOWER LEG: ICD-10-CM

## 2022-06-03 LAB
ALBUMIN SERPL BCP-MCNC: 4.1 G/DL (ref 3.5–5.2)
ALP SERPL-CCNC: 59 U/L (ref 55–135)
ALT SERPL W/O P-5'-P-CCNC: 16 U/L (ref 10–44)
ANION GAP SERPL CALC-SCNC: 9 MMOL/L (ref 8–16)
AST SERPL-CCNC: 17 U/L (ref 10–40)
BILIRUB SERPL-MCNC: 0.7 MG/DL (ref 0.1–1)
BUN SERPL-MCNC: 10 MG/DL (ref 6–20)
CALCIUM SERPL-MCNC: 9.4 MG/DL (ref 8.7–10.5)
CHLORIDE SERPL-SCNC: 105 MMOL/L (ref 95–110)
CHOLEST SERPL-MCNC: 157 MG/DL (ref 120–199)
CHOLEST/HDLC SERPL: 3 {RATIO} (ref 2–5)
CO2 SERPL-SCNC: 28 MMOL/L (ref 23–29)
COMPLEXED PSA SERPL-MCNC: 3 NG/ML (ref 0–4)
CREAT SERPL-MCNC: 1.3 MG/DL (ref 0.5–1.4)
EST. GFR  (AFRICAN AMERICAN): >60 ML/MIN/1.73 M^2
EST. GFR  (NON AFRICAN AMERICAN): >60 ML/MIN/1.73 M^2
ESTIMATED AVG GLUCOSE: 100 MG/DL (ref 68–131)
GLUCOSE SERPL-MCNC: 92 MG/DL (ref 70–110)
HBA1C MFR BLD: 5.1 % (ref 4–5.6)
HDLC SERPL-MCNC: 52 MG/DL (ref 40–75)
HDLC SERPL: 33.1 % (ref 20–50)
LDLC SERPL CALC-MCNC: 94.4 MG/DL (ref 63–159)
NONHDLC SERPL-MCNC: 105 MG/DL
POTASSIUM SERPL-SCNC: 4.1 MMOL/L (ref 3.5–5.1)
PROT SERPL-MCNC: 6.7 G/DL (ref 6–8.4)
SODIUM SERPL-SCNC: 142 MMOL/L (ref 136–145)
TRIGL SERPL-MCNC: 53 MG/DL (ref 30–150)

## 2022-06-03 PROCEDURE — 3079F DIAST BP 80-89 MM HG: CPT | Mod: CPTII,S$GLB,, | Performed by: NURSE PRACTITIONER

## 2022-06-03 PROCEDURE — 99999 PR PBB SHADOW E&M-EST. PATIENT-LVL V: ICD-10-PCS | Mod: PBBFAC,,, | Performed by: NURSE PRACTITIONER

## 2022-06-03 PROCEDURE — 99213 PR OFFICE/OUTPT VISIT, EST, LEVL III, 20-29 MIN: ICD-10-PCS | Mod: S$GLB,,, | Performed by: NURSE PRACTITIONER

## 2022-06-03 PROCEDURE — 99213 OFFICE O/P EST LOW 20 MIN: CPT | Mod: S$GLB,,, | Performed by: NURSE PRACTITIONER

## 2022-06-03 PROCEDURE — 84153 ASSAY OF PSA TOTAL: CPT | Performed by: NURSE PRACTITIONER

## 2022-06-03 PROCEDURE — 99999 PR PBB SHADOW E&M-EST. PATIENT-LVL V: CPT | Mod: PBBFAC,,, | Performed by: NURSE PRACTITIONER

## 2022-06-03 PROCEDURE — 3008F PR BODY MASS INDEX (BMI) DOCUMENTED: ICD-10-PCS | Mod: CPTII,S$GLB,, | Performed by: NURSE PRACTITIONER

## 2022-06-03 PROCEDURE — 80053 COMPREHEN METABOLIC PANEL: CPT | Performed by: NURSE PRACTITIONER

## 2022-06-03 PROCEDURE — 3074F SYST BP LT 130 MM HG: CPT | Mod: CPTII,S$GLB,, | Performed by: NURSE PRACTITIONER

## 2022-06-03 PROCEDURE — 3079F PR MOST RECENT DIASTOLIC BLOOD PRESSURE 80-89 MM HG: ICD-10-PCS | Mod: CPTII,S$GLB,, | Performed by: NURSE PRACTITIONER

## 2022-06-03 PROCEDURE — 1160F PR REVIEW ALL MEDS BY PRESCRIBER/CLIN PHARMACIST DOCUMENTED: ICD-10-PCS | Mod: CPTII,S$GLB,, | Performed by: NURSE PRACTITIONER

## 2022-06-03 PROCEDURE — 83036 HEMOGLOBIN GLYCOSYLATED A1C: CPT | Performed by: NURSE PRACTITIONER

## 2022-06-03 PROCEDURE — 36415 COLL VENOUS BLD VENIPUNCTURE: CPT | Mod: PO | Performed by: NURSE PRACTITIONER

## 2022-06-03 PROCEDURE — 1160F RVW MEDS BY RX/DR IN RCRD: CPT | Mod: CPTII,S$GLB,, | Performed by: NURSE PRACTITIONER

## 2022-06-03 PROCEDURE — 1159F MED LIST DOCD IN RCRD: CPT | Mod: CPTII,S$GLB,, | Performed by: NURSE PRACTITIONER

## 2022-06-03 PROCEDURE — 3008F BODY MASS INDEX DOCD: CPT | Mod: CPTII,S$GLB,, | Performed by: NURSE PRACTITIONER

## 2022-06-03 PROCEDURE — 80061 LIPID PANEL: CPT | Performed by: NURSE PRACTITIONER

## 2022-06-03 PROCEDURE — 3074F PR MOST RECENT SYSTOLIC BLOOD PRESSURE < 130 MM HG: ICD-10-PCS | Mod: CPTII,S$GLB,, | Performed by: NURSE PRACTITIONER

## 2022-06-03 PROCEDURE — 1159F PR MEDICATION LIST DOCUMENTED IN MEDICAL RECORD: ICD-10-PCS | Mod: CPTII,S$GLB,, | Performed by: NURSE PRACTITIONER

## 2022-06-03 NOTE — PROGRESS NOTES
"Subjective:       Patient ID: Jimi Clark is a 51 y.o. male.    Chief Complaint: Wound Check    Patient here for wound check prior to getting released to return to work  Calf with come continued tenderness  The hematoma is still present but improving  The shin wound improving as well, doing daily dsg changes  Wearing compression sock daily    Wound Check        /80   Pulse 86   Temp 97.9 °F (36.6 °C)   Ht 6' 3" (1.905 m)   Wt 110.5 kg (243 lb 9.7 oz)   BMI 30.45 kg/m²     Review of Systems   Constitutional: Negative for activity change, appetite change, chills, diaphoresis, fatigue, fever and unexpected weight change.   HENT: Negative.    Eyes: Negative.    Respiratory: Negative for apnea, chest tightness, shortness of breath and stridor.    Cardiovascular: Positive for leg swelling. Negative for chest pain and palpitations.   Gastrointestinal: Negative.    Endocrine: Negative.    Genitourinary: Negative.    Musculoskeletal: Negative for arthralgias and myalgias.   Skin: Positive for color change and wound. Negative for pallor and rash.   Allergic/Immunologic: Negative.    Neurological: Negative for dizziness, facial asymmetry, light-headedness and headaches.   Hematological: Negative for adenopathy.   Psychiatric/Behavioral: Negative for agitation and behavioral problems.       Objective:      Physical Exam  Vitals and nursing note reviewed.   Constitutional:       General: He is not in acute distress.     Appearance: He is well-developed. He is not ill-appearing, toxic-appearing or diaphoretic.   HENT:      Head: Normocephalic and atraumatic.      Right Ear: External ear normal.      Left Ear: External ear normal.      Nose: Nose normal.   Eyes:      General: Lids are normal. No scleral icterus.        Right eye: No discharge.         Left eye: No discharge.      Conjunctiva/sclera: Conjunctivae normal.   Cardiovascular:      Rate and Rhythm: Normal rate.   Pulmonary:      Effort: Pulmonary " effort is normal. No tachypnea, accessory muscle usage or respiratory distress.      Breath sounds: No stridor.   Abdominal:      General: There is no distension.      Palpations: Abdomen is soft.   Musculoskeletal:         General: Normal range of motion.      Cervical back: Full passive range of motion without pain.      Comments: Right lower leg with mild swelling,much improved since last visit. Abrasion to shin healing well. No drainage. Tenderness noted throughout shin and posterior lower calf area. Hematoma to posterior lower leg still present but improving   Skin:     General: Skin is warm and dry.      Coloration: Skin is not pale.      Findings: No rash.   Neurological:      Mental Status: He is alert and oriented to person, place, and time. He is not disoriented.   Psychiatric:         Attention and Perception: He is attentive.         Mood and Affect: Mood is not anxious or depressed. Affect is not labile, blunt, angry or inappropriate.         Speech: Speech normal.         Behavior: Behavior normal. Behavior is cooperative.         Thought Content: Thought content normal.         Judgment: Judgment normal.         Assessment:       1. Injury of right lower extremity, subsequent encounter    2. Abrasion of right lower extremity, subsequent encounter    3. Pain and swelling of right lower leg    4. Preventive measure        Plan:       Jimi was seen today for wound check.    Diagnoses and all orders for this visit:    Injury of right lower extremity, subsequent encounter  -     Ambulatory referral/consult to Physical/Occupational Therapy; Future    Abrasion of right lower extremity, subsequent encounter  -     Ambulatory referral/consult to Physical/Occupational Therapy; Future    Pain and swelling of right lower leg  -     Ambulatory referral/consult to Physical/Occupational Therapy; Future    Preventive measure  -     Comprehensive Metabolic Panel; Future  -     Lipid Panel; Future  -     Hemoglobin  A1C; Future  -     PSA, Screening; Future    physical therapy referral per patient request  Ok to return to work Monday with light duty for 2 weeks  See Dr. WEBB for wellness with fasting labs prior

## 2022-06-07 ENCOUNTER — CLINICAL SUPPORT (OUTPATIENT)
Dept: REHABILITATION | Facility: HOSPITAL | Age: 52
End: 2022-06-07
Payer: COMMERCIAL

## 2022-06-07 DIAGNOSIS — S80.811D ABRASION OF RIGHT LOWER EXTREMITY, SUBSEQUENT ENCOUNTER: ICD-10-CM

## 2022-06-07 DIAGNOSIS — M79.661 PAIN AND SWELLING OF RIGHT LOWER LEG: ICD-10-CM

## 2022-06-07 DIAGNOSIS — M79.89 PAIN AND SWELLING OF RIGHT LOWER LEG: ICD-10-CM

## 2022-06-07 DIAGNOSIS — M25.671 DECREASED RANGE OF MOTION OF RIGHT ANKLE: ICD-10-CM

## 2022-06-07 DIAGNOSIS — S89.91XD INJURY OF RIGHT LOWER EXTREMITY, SUBSEQUENT ENCOUNTER: ICD-10-CM

## 2022-06-07 PROBLEM — M25.673 DECREASED ROM OF ANKLE: Status: ACTIVE | Noted: 2022-06-07

## 2022-06-07 PROCEDURE — 97161 PT EVAL LOW COMPLEX 20 MIN: CPT | Mod: PN
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
## 2022-06-10 NOTE — PROGRESS NOTES
"[facility] OUTPATIENT THERAPY AND WELLNESS   Physical Therapy Treatment Note     Name: Jimi Clark  Clinic Number: [id number]    Therapy Diagnosis:   Encounter Diagnosis   Name Primary?    Decreased range of motion of right ankle Yes     Physician: Blanca Ibarra FNP-C    Visit Date: 6/14/2022      Physician Orders: PT Eval and Treat   Medical Diagnosis from Referral:   S89.91XD (ICD-10-CM) - Injury of right lower extremity, subsequent encounter   S80.811D (ICD-10-CM) - Abrasion of right lower extremity, subsequent encounter   M79.661,M79.89 (ICD-10-CM) - Pain and swelling of right lower leg      Evaluation Date: 6/7/2022  Authorization Period Expiration: 6/3/2023  Plan of Care Expiration: 9/7/2022  Visit # / Visits authorized: 1/ 24 (+eval)     PN DUE: 7/7/2022     Precautions: Standard and hypertension     PTA Visit #: 0/5     Time In: 3:15 pm  Time Out: 4:00 pm  Total Billable Time: 45 minutes    SUBJECTIVE     Pt reports: his leg felt better after the evaluation, but was sore after last visit.    He was compliant with home exercise program.  Response to previous treatment: soreness   Functional change: no change reported     Pain: 4/10  Location: right calf       OBJECTIVE     Objective Measures updated at progress report unless specified.     Treatment   (exercises performed today are bolded)    Jimi received the treatments listed below:      therapeutic exercises to develop strength, endurance, ROM and flexibility for 30 minutes including:  Standing stand board stretch 3 x 30"   Long sitting gastroc 5 x 15" each   Long soleus stretch 5 x 15" each   green theraband ankle inversion, eversion, and dorsiflexion 2 x 10 each   Ankle ABCs x 1   Added today:   Supine hamstring stretch 3 x 15"    LAQ 2 x 10    Bridges 2 x 10    Steam boats x 10 each    Heel/toe raises 2 x 10     manual therapy techniques: Myofacial release and Soft tissue Mobilization were applied to the: right lower extremity for 15 " "minutes, including:  IASTYM of right gastroc  silicone cupping for increased myofascial release of right gastroc     neuromuscular re-education activities to improve: Balance, Coordination and Proprioception for 0 minutes. The following activities were included: deferred   Tandem balance 2 x 30"   Tandem walking 5 laps   Marches on foam 3 x 30"    Airex step overs x10   Single leg stance 3 x 30"     gait training to improve functional mobility and safety for 0  minutes, including:      Patient Education and Home Exercises     Home Exercises Provided and Patient Education Provided     Education provided:   - Education/Self-Care provided:   · Patient educated on the impairments noted above and the effects of physical therapy intervention to improve overall condition and QOL.   · Patient was educated on all the above exercise prior/during/after for proper posture, positioning, and execution for safe performance with home exercise program.   · Patient educated on the importance of strong core and lower extremity musculature in order to improve both static and dynamic balance, improve gait mechanics, and improve household and community mobility    Written Home Exercises Provided: Patient instructed to cont prior HEP. Exercises were reviewed and Shama was able to demonstrate them prior to the end of the session.  Shama demonstrated good  understanding of the education provided. See EMR under Patient Instructions for exercises provided during therapy sessions    ASSESSMENT     Patient presents for first follow up visit with decreased right lower extremity pain and swelling. Steam boats and heel/toe raises were performed for increased dynamic ankle stability and range of motion. Hamstring stretch was performed for stretching and increased extensibility of posterior chain and popliteus. Patient tolerated all exercises well with minimal complaints. Patient continues to present with extensive hypertonicity and myofascial " trigger points throughout right gastrocnemius, but presents with excellent tolerance with manual therapy. Continue to progress patient towards prior level of function according to tolerance. Plan to try more standing and balance exercises for increased dynamic ankle stability for return to prior level of function.     Shama Is progressing well towards his goals.   Pt prognosis is Good.     Pt will continue to benefit from skilled outpatient physical therapy to address the deficits listed in the problem list box on initial evaluation, provide pt/family education and to maximize pt's level of independence in the home and community environment.     Pt's spiritual, cultural and educational needs considered and pt agreeable to plan of care and goals.     Anticipated barriers to physical therapy: pain     Goals:   Short Term Goals:  4 weeks   1. Pain: Pt will demonstrate improved pain by reports of less than or equal to 7/10 worst pain on the verbal rating scale in order to progress toward maximal functional ability and improve QOL. PC   2.  Mobility: Patient will present with full right ankle inversion and eversion in order to return to maximal functional potential and improve quality of life. PC   3.  HEP: Patient will demonstrate independence with HEP in order to progress toward functional independence. PC      Long Term Goals:  8 weeks   1. Pain: Pt will demonstrate improved pain by reports of less than or equal to 5/10 worst pain on the verbal rating scale in order to progress toward maximal functional ability and improve QOL.  PC   2. Function: Patient will demonstrate improved function as indicated by a functional limitation score of 30% on the FOTO. PC   3. Mobility: Patient will improve AROM of right ankle dorsiflexion to 10 degrees in order to return to maximal functional potential and improve quality of life. PC   4. Strength: Patient will improve strength to 5/5 in bilateral lower extremities in order to  improve functional independence and quality of life. PC   5. Patient will return to normal ADL's, IADL's, community involvement, recreational activities, and work-related activities with no pain and maximal function. PC      Goals Key:  PC= progressing/continue;        PM= partially met;             DC= discontinue      PLAN     Plan of care Certification: 6/7/2022 to 9/7/2022.     Cont PT per POC and progress pt as tolerated and appropriate.    Ira Simon, PT, DPT, PPCES  06/15/2022

## 2022-06-14 ENCOUNTER — CLINICAL SUPPORT (OUTPATIENT)
Dept: REHABILITATION | Facility: HOSPITAL | Age: 52
End: 2022-06-14
Payer: COMMERCIAL

## 2022-06-14 DIAGNOSIS — M25.671 DECREASED RANGE OF MOTION OF RIGHT ANKLE: Primary | ICD-10-CM

## 2022-06-14 PROCEDURE — 97110 THERAPEUTIC EXERCISES: CPT | Mod: PN

## 2022-06-14 PROCEDURE — 97140 MANUAL THERAPY 1/> REGIONS: CPT | Mod: PN

## 2022-06-15 NOTE — PROGRESS NOTES
"[facility]   Physical Therapy Treatment Note     Name: Jimi Clark  Clinic Number: [id number]    Therapy Diagnosis:   Encounter Diagnosis   Name Primary?    Decreased range of motion of ankle, unspecified laterality Yes     Physician: Blanca Ibarra FNP-C    Visit Date: 6/16/2022      Physician Orders: PT Eval and Treat   Medical Diagnosis from Referral:   S89.91XD (ICD-10-CM) - Injury of right lower extremity, subsequent encounter   S80.811D (ICD-10-CM) - Abrasion of right lower extremity, subsequent encounter   M79.661,M79.89 (ICD-10-CM) - Pain and swelling of right lower leg      Evaluation Date: 6/7/2022  Authorization Period Expiration: 6/3/2023  Plan of Care Expiration: 9/7/2022  Visit # / Visits authorized: 2/ 24 (+eval)     PN DUE: 7/7/2022     Precautions: Standard and hypertension     PTA Visit #: 0/5     Time In: 8:30 am   Time Out: 9:15 am   Total Billable Time: 45 minutes (+10 minutes of cold pack)    SUBJECTIVE     Pt reports: he was in a little pain and soreness after last visit.   He was compliant with home exercise program.  Response to previous treatment: soreness   Functional change: no change reported     Pain: 4/10  Location: right calf       OBJECTIVE     Objective Measures updated at progress report unless specified.     Treatment   (exercises performed today are bolded)    Jimi received the treatments listed below:      therapeutic exercises to develop strength, endurance, ROM and flexibility for 30 minutes including:  recumbent bike for increased lower extremity range of motion, endurance, and strength (seat 14)   Standing stand board stretch 3 x 30"   Long sitting gastroc 5 x 15" each   Long soleus stretch 5 x 15" each  green theraband ankle inversion, eversion, and dorsiflexion 2 x 10 each   Ankle ABCs x 1   Supine hamstring stretch 3 x 15"  LAQ 2 x 10   Bridges 2 x 10 +with green theraband   Steam boats x 10 each   Heel/toe raises 2 x 10   + " "lunges in // to airex pad x 10 each     manual therapy techniques: Myofacial release and Soft tissue Mobilization were applied to the: right lower extremity for 15 minutes, including:  IASTYM of right gastroc  silicone cupping for increased myofascial release of right gastroc     neuromuscular re-education activities to improve: Balance, Coordination and Proprioception for 0 minutes. The following activities were included: deferred   Tandem balance 2 x 30"   Tandem walking 5 laps   Marches on foam 3 x 30"    Airex step overs x10   Single leg stance 3 x 30"     gait training to improve functional mobility and safety for 0  minutes, including:    Cold pack to right calf for decreased pain and swelling for 10 minutes     Patient Education and Home Exercises     Home Exercises Provided and Patient Education Provided     Education provided:   - Education/Self-Care provided:   · Patient educated on the impairments noted above and the effects of physical therapy intervention to improve overall condition and QOL.   · Patient was educated on all the above exercise prior/during/after for proper posture, positioning, and execution for safe performance with home exercise program.   · Patient educated on the importance of strong core and lower extremity musculature in order to improve both static and dynamic balance, improve gait mechanics, and improve household and community mobility    Written Home Exercises Provided: Patient instructed to cont prior HEP. Exercises were reviewed and Shama was able to demonstrate them prior to the end of the session.  Shama demonstrated good  understanding of the education provided. See EMR under Patient Instructions for exercises provided during therapy sessions    ASSESSMENT     Patient presents for second follow up visit with continued right lower extremity pain. Lunges were performed for increased dynamic ankle stability and range of motion. Patient tolerated all exercises well with " minimal complaints. Continued hypertonicity and myofascial trigger points throughout right gastrocnemius. Continue to progress patient towards prior level of function and more standing exercises according to tolerance.     Shama Is progressing well towards his goals.   Pt prognosis is Good.     Pt will continue to benefit from skilled outpatient physical therapy to address the deficits listed in the problem list box on initial evaluation, provide pt/family education and to maximize pt's level of independence in the home and community environment.     Pt's spiritual, cultural and educational needs considered and pt agreeable to plan of care and goals.     Anticipated barriers to physical therapy: pain     Goals:   Short Term Goals:  4 weeks   1. Pain: Pt will demonstrate improved pain by reports of less than or equal to 7/10 worst pain on the verbal rating scale in order to progress toward maximal functional ability and improve QOL. MET 6/16/2022   2.  Mobility: Patient will present with full right ankle inversion and eversion in order to return to maximal functional potential and improve quality of life. PC   3.  HEP: Patient will demonstrate independence with HEP in order to progress toward functional independence. MET 6/16/2022      Long Term Goals:  8 weeks   1. Pain: Pt will demonstrate improved pain by reports of less than or equal to 5/10 worst pain on the verbal rating scale in order to progress toward maximal functional ability and improve QOL.  PC   2. Function: Patient will demonstrate improved function as indicated by a functional limitation score of 30% on the FOTO. PC   3. Mobility: Patient will improve AROM of right ankle dorsiflexion to 10 degrees in order to return to maximal functional potential and improve quality of life. PC   4. Strength: Patient will improve strength to 5/5 in bilateral lower extremities in order to improve functional independence and quality of life. PC   5. Patient will  return to normal ADL's, IADL's, community involvement, recreational activities, and work-related activities with no pain and maximal function. PC      Goals Key:  PC= progressing/continue;        PM= partially met;             DC= discontinue      PLAN     Plan of care Certification: 6/7/2022 to 9/7/2022.     Cont PT per POC and progress pt as tolerated and appropriate.    Ira Simon, PT, DPT, PPCES  06/16/2022

## 2022-06-16 ENCOUNTER — CLINICAL SUPPORT (OUTPATIENT)
Dept: REHABILITATION | Facility: HOSPITAL | Age: 52
End: 2022-06-16
Payer: COMMERCIAL

## 2022-06-16 DIAGNOSIS — M25.673 DECREASED RANGE OF MOTION OF ANKLE, UNSPECIFIED LATERALITY: Primary | ICD-10-CM

## 2022-06-16 PROCEDURE — 97110 THERAPEUTIC EXERCISES: CPT | Mod: PN

## 2022-06-16 PROCEDURE — 97140 MANUAL THERAPY 1/> REGIONS: CPT | Mod: PN

## 2022-06-20 NOTE — PROGRESS NOTES
"[facility]   Physical Therapy Treatment Note     Name: Jimi Clark  Clinic Number: [id number]    Therapy Diagnosis:   Encounter Diagnosis   Name Primary?    Decreased range of motion of ankle, unspecified laterality Yes     Physician: Blanca Ibarra FNP-C    Visit Date: 6/21/2022      Physician Orders: PT Eval and Treat   Medical Diagnosis from Referral:   S89.91XD (ICD-10-CM) - Injury of right lower extremity, subsequent encounter   S80.811D (ICD-10-CM) - Abrasion of right lower extremity, subsequent encounter   M79.661,M79.89 (ICD-10-CM) - Pain and swelling of right lower leg      Evaluation Date: 6/7/2022  Authorization Period Expiration: 6/3/2023  Plan of Care Expiration: 9/7/2022  Visit # / Visits authorized: 3/ 24 (+eval)     PN DUE: 7/7/2022     Precautions: Standard and hypertension     PTA Visit #: 0/5     Time In: 8:32 am   Time Out: 9:22 am   Total Billable Time: 50 minutes (+10 minutes of cold pack)    SUBJECTIVE     Pt reports: slowly getting better, but consistently still swelling and lower leg warm. Increased pain and swelling yesterday at about 1pm.   He was compliant with home exercise program.  Response to previous treatment: soreness   Functional change: no change reported     Pain: 3/10  Location: right calf       OBJECTIVE     Objective Measures updated at progress report unless specified.     Treatment   (exercises performed today are bolded)    Jimi received the treatments listed below:      therapeutic exercises to develop strength, endurance, ROM and flexibility for 35 minutes including:  recumbent bike for increased lower extremity range of motion, endurance, and strength (seat 14)   Standing stand board stretch 3 x 30"   Long sitting gastroc 5 x 15" each   Long soleus stretch 5 x 15" each   blue theraband ankle inversion, eversion, and dorsiflexion 2 x 10 each   Ankle ABCs x 1   Supine hamstring stretch 3 x 15"   LAQ 2 x 10   Bridges 2 x 10 +with " "blue theraband   Steam boats x 10 each   Heel/toe raises 2 x 10  lunges in // to airex pad x 10 each     manual therapy techniques: Myofacial release and Soft tissue Mobilization were applied to the: right lower extremity for 10 minutes, including:  IASTYM of right gastroc  silicone cupping for increased myofascial release of right gastroc     neuromuscular re-education activities to improve: Balance, Coordination and Proprioception for 5 minutes. The following activities were included:  + single leg balance on tramp 3 x 30"  + Tandem walking 1 laps (too easy)    Deferred:   Marches on foam 3 x 30"    Airex step overs x10     gait training to improve functional mobility and safety for 0  minutes, including:    Cold pack to right calf for decreased pain and swelling for 10 minutes     Patient Education and Home Exercises     Home Exercises Provided and Patient Education Provided     Education provided:   - Education/Self-Care provided:   · Patient educated on the impairments noted above and the effects of physical therapy intervention to improve overall condition and QOL.   · Patient was educated on all the above exercise prior/during/after for proper posture, positioning, and execution for safe performance with home exercise program.   · Patient educated on the importance of strong core and lower extremity musculature in order to improve both static and dynamic balance, improve gait mechanics, and improve household and community mobility    Written Home Exercises Provided: Patient instructed to cont prior HEP. Exercises were reviewed and Shama was able to demonstrate them prior to the end of the session.  Shama demonstrated good  understanding of the education provided. See EMR under Patient Instructions for exercises provided during therapy sessions    ASSESSMENT     Patient presents with continued lower extremity pain and swelling. Patient asked to send a picture of his wound to MD secondary to suspicion of " infection. Patient tolerated all exercises well with minimal complaints. Balance exercises were introduced for incereased ankle stability and range of motion. Continue to progress patient towards prior level of function and more standing exercises according to tolerance.     Shama Is progressing well towards his goals.   Pt prognosis is Good.     Pt will continue to benefit from skilled outpatient physical therapy to address the deficits listed in the problem list box on initial evaluation, provide pt/family education and to maximize pt's level of independence in the home and community environment.     Pt's spiritual, cultural and educational needs considered and pt agreeable to plan of care and goals.     Anticipated barriers to physical therapy: pain     Goals:   Short Term Goals:  4 weeks   1. Pain: Pt will demonstrate improved pain by reports of less than or equal to 7/10 worst pain on the verbal rating scale in order to progress toward maximal functional ability and improve QOL. MET 6/16/2022   2.  Mobility: Patient will present with full right ankle inversion and eversion in order to return to maximal functional potential and improve quality of life. PC   3.  HEP: Patient will demonstrate independence with HEP in order to progress toward functional independence. MET 6/16/2022      Long Term Goals:  8 weeks   1. Pain: Pt will demonstrate improved pain by reports of less than or equal to 5/10 worst pain on the verbal rating scale in order to progress toward maximal functional ability and improve QOL.  PC   2. Function: Patient will demonstrate improved function as indicated by a functional limitation score of 30% on the FOTO. PC   3. Mobility: Patient will improve AROM of right ankle dorsiflexion to 10 degrees in order to return to maximal functional potential and improve quality of life. PC   4. Strength: Patient will improve strength to 5/5 in bilateral lower extremities in order to improve functional  independence and quality of life. PC   5. Patient will return to normal ADL's, IADL's, community involvement, recreational activities, and work-related activities with no pain and maximal function. PC      Goals Key:  PC= progressing/continue;        PM= partially met;             DC= discontinue      PLAN     Plan of care Certification: 6/7/2022 to 9/7/2022.     Cont PT per POC and progress pt as tolerated and appropriate.    Ira Simon, PT, DPT, PPCES  06/21/2022

## 2022-06-21 ENCOUNTER — TELEPHONE (OUTPATIENT)
Dept: INTERNAL MEDICINE | Facility: CLINIC | Age: 52
End: 2022-06-21
Payer: COMMERCIAL

## 2022-06-21 ENCOUNTER — CLINICAL SUPPORT (OUTPATIENT)
Dept: REHABILITATION | Facility: HOSPITAL | Age: 52
End: 2022-06-21
Payer: COMMERCIAL

## 2022-06-21 DIAGNOSIS — M25.673 DECREASED RANGE OF MOTION OF ANKLE, UNSPECIFIED LATERALITY: Primary | ICD-10-CM

## 2022-06-21 PROCEDURE — 97140 MANUAL THERAPY 1/> REGIONS: CPT | Mod: PN

## 2022-06-21 PROCEDURE — 97110 THERAPEUTIC EXERCISES: CPT | Mod: PN

## 2022-06-21 NOTE — TELEPHONE ENCOUNTER
----- Message from Jen Varela sent at 6/21/2022  9:58 AM CDT -----  Regarding: appt  Contact: patient  Patient is requesting same day appt for a wound that is not healing, please call him back at [phone]

## 2022-06-21 NOTE — TELEPHONE ENCOUNTER
Spoke with pt and he says that he has a wound from an accident in [place] that may not be healing properly. Offer pt the soonest appt with raeann but pt says that was too far out so pt will be going to the ER

## 2022-06-21 NOTE — PROGRESS NOTES
"[facility]   Physical Therapy Treatment Note     Name: Jimi Clark  Clinic Number: [id number]    Therapy Diagnosis:   Encounter Diagnosis   Name Primary?    Decreased range of motion of right ankle Yes     Physician: Blanca Ibarra FNP-C    Visit Date: 6/23/2022      Physician Orders: PT Eval and Treat   Medical Diagnosis from Referral:   S89.91XD (ICD-10-CM) - Injury of right lower extremity, subsequent encounter   S80.811D (ICD-10-CM) - Abrasion of right lower extremity, subsequent encounter   M79.661,M79.89 (ICD-10-CM) - Pain and swelling of right lower leg      Evaluation Date: 6/7/2022  Authorization Period Expiration: 6/3/2023  Plan of Care Expiration: 9/7/2022  Visit # / Visits authorized: 4/ 24 (+eval)     PN DUE: 7/7/2022     Precautions: Standard and hypertension     PTA Visit #: 0/5     Time In: 8:32 am   Time Out: 9:22 am   Total Billable Time: 50 minutes (+10 minutes of cold pack)    SUBJECTIVE     Pt reports: leg is slowly getting better. He went see the doctor and she prescribed Bactrim and says the wound is healing appropriately.   He was compliant with home exercise program.  Response to previous treatment: soreness   Functional change: no change reported     Pain: 2/10  Location: right calf       OBJECTIVE     Objective Measures updated at progress report unless specified.     Treatment   (exercises performed today are bolded)    Jimi received the treatments listed below:      therapeutic exercises to develop strength, endurance, ROM and flexibility for 35 minutes including:  recumbent bike for increased lower extremity range of motion, endurance, and strength (seat 14), + resistance 4   Standing stand board stretch 3 x 30"   Long sitting gastroc 3 x 30" each   Long soleus stretch 5 x 15" each   blue theraband ankle inversion, eversion, and dorsiflexion 2 x 10 each   Ankle ABCs x 1   Supine hamstring stretch 3 x 15"   LAQ 2 x 10   Bridges 2 x 10 with blue " "theraband   Steam boats x 10 each   Heel/toe raises 2 x 10   lunges in // to airex pad x 10 each   + side stepping 2 x laps with blue theraband     manual therapy techniques: Myofacial release and Soft tissue Mobilization were applied to the: right lower extremity for 10 minutes, including:  IASTYM of right gastroc  silicone cupping for increased myofascial release of right gastroc     neuromuscular re-education activities to improve: Balance, Coordination and Proprioception for 5 minutes. The following activities were included:  single leg balance on tramp 3 x 30"     Deferred:   Marches on foam 3 x 30"    Airex step overs x10     gait training to improve functional mobility and safety for 0  minutes, including:    Cold pack to right calf for decreased pain and swelling for 10 minutes     Patient Education and Home Exercises     Home Exercises Provided and Patient Education Provided     Education provided:   - Education/Self-Care provided:   · Patient educated on the impairments noted above and the effects of physical therapy intervention to improve overall condition and QOL.   · Patient was educated on all the above exercise prior/during/after for proper posture, positioning, and execution for safe performance with home exercise program.   · Patient educated on the importance of strong core and lower extremity musculature in order to improve both static and dynamic balance, improve gait mechanics, and improve household and community mobility    Written Home Exercises Provided: Patient instructed to cont prior HEP. Exercises were reviewed and Shama was able to demonstrate them prior to the end of the session.  Shama demonstrated good  understanding of the education provided. See EMR under Patient Instructions for exercises provided during therapy sessions    ASSESSMENT     Patient presents with decreased lower extremity pain. Patient tolerated all exercises well with minimal complaints. Side stepping with " thera band was introduced for increased ankle and hip stability. Continue to progress patient towards prior level of function and more standing exercises according to tolerance.     Shama Is progressing well towards his goals.   Pt prognosis is Good.     Pt will continue to benefit from skilled outpatient physical therapy to address the deficits listed in the problem list box on initial evaluation, provide pt/family education and to maximize pt's level of independence in the home and community environment.     Pt's spiritual, cultural and educational needs considered and pt agreeable to plan of care and goals.     Anticipated barriers to physical therapy: pain     Goals:   Short Term Goals:  4 weeks   1. Pain: Pt will demonstrate improved pain by reports of less than or equal to 7/10 worst pain on the verbal rating scale in order to progress toward maximal functional ability and improve QOL. MET 6/16/2022   2.  Mobility: Patient will present with full right ankle inversion and eversion in order to return to maximal functional potential and improve quality of life. PC   3.  HEP: Patient will demonstrate independence with HEP in order to progress toward functional independence. MET 6/16/2022      Long Term Goals:  8 weeks   1. Pain: Pt will demonstrate improved pain by reports of less than or equal to 5/10 worst pain on the verbal rating scale in order to progress toward maximal functional ability and improve QOL.  PC   2. Function: Patient will demonstrate improved function as indicated by a functional limitation score of 30% on the FOTO. PC   3. Mobility: Patient will improve AROM of right ankle dorsiflexion to 10 degrees in order to return to maximal functional potential and improve quality of life. PC   4. Strength: Patient will improve strength to 5/5 in bilateral lower extremities in order to improve functional independence and quality of life. PC   5. Patient will return to normal ADL's, IADL's, community  involvement, recreational activities, and work-related activities with no pain and maximal function. PC      Goals Key:  PC= progressing/continue;        PM= partially met;             DC= discontinue      PLAN     Plan of care Certification: 6/7/2022 to 9/7/2022.     Cont PT per POC and progress pt as tolerated and appropriate.    Ira Simon, PT, DPT, PPCES  06/23/2022

## 2022-06-22 ENCOUNTER — TELEPHONE (OUTPATIENT)
Dept: INTERNAL MEDICINE | Facility: CLINIC | Age: 52
End: 2022-06-22

## 2022-06-22 ENCOUNTER — OFFICE VISIT (OUTPATIENT)
Dept: INTERNAL MEDICINE | Facility: CLINIC | Age: 52
End: 2022-06-22
Payer: COMMERCIAL

## 2022-06-22 VITALS
HEART RATE: 86 BPM | DIASTOLIC BLOOD PRESSURE: 100 MMHG | BODY MASS INDEX: 30.51 KG/M2 | SYSTOLIC BLOOD PRESSURE: 140 MMHG | TEMPERATURE: 98 F | HEIGHT: 75 IN | WEIGHT: 245.38 LBS

## 2022-06-22 DIAGNOSIS — S80.811D ABRASION OF RIGHT LOWER EXTREMITY, SUBSEQUENT ENCOUNTER: Primary | ICD-10-CM

## 2022-06-22 PROCEDURE — 3080F PR MOST RECENT DIASTOLIC BLOOD PRESSURE >= 90 MM HG: ICD-10-PCS | Mod: CPTII,S$GLB,, | Performed by: NURSE PRACTITIONER

## 2022-06-22 PROCEDURE — 3008F PR BODY MASS INDEX (BMI) DOCUMENTED: ICD-10-PCS | Mod: CPTII,S$GLB,, | Performed by: NURSE PRACTITIONER

## 2022-06-22 PROCEDURE — 99999 PR PBB SHADOW E&M-EST. PATIENT-LVL IV: ICD-10-PCS | Mod: PBBFAC,,, | Performed by: NURSE PRACTITIONER

## 2022-06-22 PROCEDURE — 99213 PR OFFICE/OUTPT VISIT, EST, LEVL III, 20-29 MIN: ICD-10-PCS | Mod: S$GLB,,, | Performed by: NURSE PRACTITIONER

## 2022-06-22 PROCEDURE — 1159F PR MEDICATION LIST DOCUMENTED IN MEDICAL RECORD: ICD-10-PCS | Mod: CPTII,S$GLB,, | Performed by: NURSE PRACTITIONER

## 2022-06-22 PROCEDURE — 99213 OFFICE O/P EST LOW 20 MIN: CPT | Mod: S$GLB,,, | Performed by: NURSE PRACTITIONER

## 2022-06-22 PROCEDURE — 99999 PR PBB SHADOW E&M-EST. PATIENT-LVL IV: CPT | Mod: PBBFAC,,, | Performed by: NURSE PRACTITIONER

## 2022-06-22 PROCEDURE — 3077F SYST BP >= 140 MM HG: CPT | Mod: CPTII,S$GLB,, | Performed by: NURSE PRACTITIONER

## 2022-06-22 PROCEDURE — 3077F PR MOST RECENT SYSTOLIC BLOOD PRESSURE >= 140 MM HG: ICD-10-PCS | Mod: CPTII,S$GLB,, | Performed by: NURSE PRACTITIONER

## 2022-06-22 PROCEDURE — 3044F HG A1C LEVEL LT 7.0%: CPT | Mod: CPTII,S$GLB,, | Performed by: NURSE PRACTITIONER

## 2022-06-22 PROCEDURE — 3080F DIAST BP >= 90 MM HG: CPT | Mod: CPTII,S$GLB,, | Performed by: NURSE PRACTITIONER

## 2022-06-22 PROCEDURE — 3008F BODY MASS INDEX DOCD: CPT | Mod: CPTII,S$GLB,, | Performed by: NURSE PRACTITIONER

## 2022-06-22 PROCEDURE — 1159F MED LIST DOCD IN RCRD: CPT | Mod: CPTII,S$GLB,, | Performed by: NURSE PRACTITIONER

## 2022-06-22 PROCEDURE — 3044F PR MOST RECENT HEMOGLOBIN A1C LEVEL <7.0%: ICD-10-PCS | Mod: CPTII,S$GLB,, | Performed by: NURSE PRACTITIONER

## 2022-06-22 RX ORDER — MUPIROCIN 20 MG/G
OINTMENT TOPICAL 3 TIMES DAILY
Qty: 30 G | Refills: 0 | Status: SHIPPED | OUTPATIENT
Start: 2022-06-22 | End: 2022-06-22 | Stop reason: SDUPTHER

## 2022-06-22 RX ORDER — MUPIROCIN 20 MG/G
OINTMENT TOPICAL 3 TIMES DAILY
Qty: 30 G | Refills: 0 | Status: SHIPPED | OUTPATIENT
Start: 2022-06-22 | End: 2022-07-02

## 2022-06-22 NOTE — PROGRESS NOTES
"Subjective:       Patient ID: Jimi Clark is a 51 y.o. male.    Chief Complaint: Wound Check    Patient here for wound check  States that at physical therapy he asked them to check wound and they recommended I look at it  Healing well  No drainage  Not back at work even though I released him on limited activity. His work would not accommodate him  Doing therapy twice weekly  Swelling improved greatly  No fever    Wound Check        BP (!) 140/100   Pulse 86   Temp 98.4 °F (36.9 °C)   Ht 6' 3" (1.905 m)   Wt 111.3 kg (245 lb 6 oz)   BMI 30.67 kg/m²     Review of Systems   Constitutional: Negative for activity change, appetite change, chills, diaphoresis, fatigue, fever and unexpected weight change.   HENT: Negative.    Eyes: Negative.    Respiratory: Negative for apnea, chest tightness, shortness of breath and stridor.    Cardiovascular: Negative for chest pain, palpitations and leg swelling.   Gastrointestinal: Negative.    Endocrine: Negative.    Genitourinary: Negative.    Musculoskeletal: Negative for arthralgias and myalgias.   Skin: Positive for wound. Negative for color change, pallor and rash.   Allergic/Immunologic: Negative.    Neurological: Negative for dizziness, facial asymmetry, light-headedness and headaches.   Hematological: Negative for adenopathy.   Psychiatric/Behavioral: Negative for agitation and behavioral problems.       Objective:      Physical Exam  Vitals and nursing note reviewed.   Constitutional:       General: He is not in acute distress.     Appearance: He is well-developed. He is not diaphoretic.   HENT:      Head: Normocephalic and atraumatic.   Cardiovascular:      Rate and Rhythm: Normal rate.   Pulmonary:      Effort: Pulmonary effort is normal. No respiratory distress.   Skin:     General: Skin is warm and dry.      Findings: No rash.      Comments: Anterior shin wound, still with 1 small dime sized opened area. No cellulitis or drainage. Granulation tissue noted "   Neurological:      Mental Status: He is alert and oriented to person, place, and time.   Psychiatric:         Behavior: Behavior normal.         Thought Content: Thought content normal.         Judgment: Judgment normal.         Assessment:       1. Abrasion of right lower extremity, subsequent encounter        Plan:       Jimi was seen today for wound check.    Diagnoses and all orders for this visit:    Abrasion of right lower extremity, subsequent encounter  -     mupirocin (BACTROBAN) 2 % ointment; Apply topically 3 (three) times daily. for 10 days  Add bactroban  Wound care discussed  Continue physical therapy  bp high today; monitor

## 2022-06-22 NOTE — TELEPHONE ENCOUNTER
----- Message from Blanca Ibarra, MIESHAP-C sent at 6/22/2022 12:50 PM CDT -----  Patient's bp here today was high. Ask him to check it at home and send readings on my [facility] to me friday

## 2022-06-22 NOTE — Clinical Note
Patient's bp here today was high. Ask him to check it at home and send readings on my [facility] to me friday

## 2022-06-23 ENCOUNTER — CLINICAL SUPPORT (OUTPATIENT)
Dept: REHABILITATION | Facility: HOSPITAL | Age: 52
End: 2022-06-23
Payer: COMMERCIAL

## 2022-06-23 DIAGNOSIS — M25.671 DECREASED RANGE OF MOTION OF RIGHT ANKLE: Primary | ICD-10-CM

## 2022-06-23 PROCEDURE — 97110 THERAPEUTIC EXERCISES: CPT | Mod: PN

## 2022-06-23 PROCEDURE — 97140 MANUAL THERAPY 1/> REGIONS: CPT | Mod: PN

## 2022-06-29 NOTE — PROGRESS NOTES
"[facility]   Physical Therapy Treatment Note     Name: Jimi Clark  Clinic Number: [id number]    Therapy Diagnosis:   Encounter Diagnosis   Name Primary?    Decreased range of motion of ankle, unspecified laterality Yes     Physician: Blanca Ibarra FNP-C    Visit Date: 2022      Physician Orders: PT Eval and Treat   Medical Diagnosis from Referral:   S89.91XD (ICD-10-CM) - Injury of right lower extremity, subsequent encounter   S80.811D (ICD-10-CM) - Abrasion of right lower extremity, subsequent encounter   M79.661,M79.89 (ICD-10-CM) - Pain and swelling of right lower leg      Evaluation Date: 2022  Authorization Period Expiration: 6/3/2023  Plan of Care Expiration: 2022  Visit # / Visits authorized:  (+eval)     PN DUE: 2022     Precautions: Standard and hypertension     PTA Visit #: 0/5     Time In: 8:32 am   Time Out: 9:15 am   Total Billable Time: 43 minutes (+15 minutes of cold pack)    SUBJECTIVE     Pt reports: no pain this morning. Patient reports soreness from walking at a  service.   He was compliant with home exercise program.  Response to previous treatment: soreness   Functional change: no change reported     Pain: 0/10  Location: right calf       OBJECTIVE     Objective Measures updated at progress report unless specified.     Treatment   (exercises performed today are bolded)    Jimi received the treatments listed below:      therapeutic exercises to develop strength, endurance, ROM and flexibility for 33 minutes including:  recumbent bike for increased lower extremity range of motion, endurance, and strength (seat 14) for 5 minutes, resistance 4   Standing stand board stretch 3 x 30"   Long sitting gastroc 3 x 30" each   Long soleus stretch 5 x 15" each   blue theraband ankle inversion, eversion, and dorsiflexion 2 x 10 each   Ankle ABCs x 1   Supine hamstring stretch 3 x 15"   LAQ 2 x 10   Bridges 2 x 10 with blue theraband " "  Steam boats x 10 each   Heel/toe raises 2 x 10   lunges in // to airex pad 2 x 10 each   side stepping 2 x laps with blue theraband   + monster walks 2 laps with blue theraband     manual therapy techniques: Myofacial release and Soft tissue Mobilization were applied to the: right lower extremity for 10 minutes, including:  IASTYM of right gastroc   silicone cupping for increased myofascial release of right gastroc     neuromuscular re-education activities to improve: Balance, Coordination and Proprioception for 5 minutes. The following activities were included:  single leg balance on tramp 3 x 30"     Deferred:   Marches on foam 3 x 30"    Airex step overs x10     gait training to improve functional mobility and safety for 0  minutes, including:    Cold pack to right calf for decreased pain and swelling for 15 minutes     Patient Education and Home Exercises     Home Exercises Provided and Patient Education Provided     Education provided:   - Education/Self-Care provided:   · Patient educated on the impairments noted above and the effects of physical therapy intervention to improve overall condition and QOL.   · Patient was educated on all the above exercise prior/during/after for proper posture, positioning, and execution for safe performance with home exercise program.   · Patient educated on the importance of strong core and lower extremity musculature in order to improve both static and dynamic balance, improve gait mechanics, and improve household and community mobility    Written Home Exercises Provided: Patient instructed to cont prior HEP. Exercises were reviewed and Shama was able to demonstrate them prior to the end of the session.  Shama demonstrated good  understanding of the education provided. See EMR under Patient Instructions for exercises provided during therapy sessions    ASSESSMENT   Patient continues to progress well with therapy with only minimal complaints of soreness. Patient " tolerated all exercises well with no adverse effects. Monster walks with thera band was introduced for farther progression of ankle and hip stability. Continue to progress patient towards prior level of function according to tolerance.     Shama Is progressing well towards his goals.   Pt prognosis is Good.     Pt will continue to benefit from skilled outpatient physical therapy to address the deficits listed in the problem list box on initial evaluation, provide pt/family education and to maximize pt's level of independence in the home and community environment.     Pt's spiritual, cultural and educational needs considered and pt agreeable to plan of care and goals.     Anticipated barriers to physical therapy: pain     Goals:   Short Term Goals:  4 weeks   1. Pain: Pt will demonstrate improved pain by reports of less than or equal to 7/10 worst pain on the verbal rating scale in order to progress toward maximal functional ability and improve QOL. MET 6/16/2022   2.  Mobility: Patient will present with full right ankle inversion and eversion in order to return to maximal functional potential and improve quality of life. PC   3.  HEP: Patient will demonstrate independence with HEP in order to progress toward functional independence. MET 6/16/2022      Long Term Goals:  8 weeks   1. Pain: Pt will demonstrate improved pain by reports of less than or equal to 5/10 worst pain on the verbal rating scale in order to progress toward maximal functional ability and improve QOL.  MET 6/30/2022   2. Function: Patient will demonstrate improved function as indicated by a functional limitation score of 30% on the FOTO. PC   3. Mobility: Patient will improve AROM of right ankle dorsiflexion to 10 degrees in order to return to maximal functional potential and improve quality of life. PC   4. Strength: Patient will improve strength to 5/5 in bilateral lower extremities in order to improve functional independence and quality of  life. PC   5. Patient will return to normal ADL's, IADL's, community involvement, recreational activities, and work-related activities with no pain and maximal function. PC      Goals Key:  PC= progressing/continue;        PM= partially met;             DC= discontinue      PLAN     Plan of care Certification: 6/7/2022 to 9/7/2022.     Cont PT per POC and progress pt as tolerated and appropriate.    Ira Simon, PT, DPT, PPCES  06/30/2022

## 2022-06-30 ENCOUNTER — CLINICAL SUPPORT (OUTPATIENT)
Dept: REHABILITATION | Facility: HOSPITAL | Age: 52
End: 2022-06-30
Payer: COMMERCIAL

## 2022-06-30 DIAGNOSIS — M25.673 DECREASED RANGE OF MOTION OF ANKLE, UNSPECIFIED LATERALITY: Primary | ICD-10-CM

## 2022-06-30 PROCEDURE — 97110 THERAPEUTIC EXERCISES: CPT | Mod: PN

## 2022-06-30 PROCEDURE — 97140 MANUAL THERAPY 1/> REGIONS: CPT | Mod: PN

## 2022-06-30 NOTE — PROGRESS NOTES
Procedure instructions reviewed. Place, time, nothing to eat or drink after 2 am dose of prep, no chewing gum or sucking on candy, take BP medication with sip of water 2 hours after am dose of prep, have someone to drive them home and bowel prep instructions reviewed with pt. Pt verbalized understanding.

## 2022-07-01 NOTE — PROGRESS NOTES
[facility]   Physical Therapy Treatment Note     Name: Jimi Clark  [facility] Number: [id number]    Therapy Diagnosis:   Encounter Diagnosis   Name Primary?    Decreased range of motion of ankle, unspecified laterality Yes     Physician: Blanca Ibarra FNP-C    Visit Date: 7/5/2022      Physician Orders: PT Eval and Treat   Medical Diagnosis from Referral:   S89.91XD (ICD-10-CM) - Injury of right lower extremity, subsequent encounter   S80.811D (ICD-10-CM) - Abrasion of right lower extremity, subsequent encounter   M79.661,M79.89 (ICD-10-CM) - Pain and swelling of right lower leg      Evaluation Date: 6/7/2022  Authorization Period Expiration: 6/3/2023  Plan of Care Expiration: 9/7/2022  Visit # / Visits authorized: 6/ 24 (+eval)     PN DUE: 7/7/2022     Precautions: Standard and hypertension     PTA Visit #: 0/5     Time In: 9:15 am   Time Out: 10:00 am   Total Billable Time: 45 minutes (+15 minutes of cold pack)    SUBJECTIVE     Pt reports: his calf is bothering him more this weekend and had more swelling.   He was compliant with home exercise program.  Response to previous treatment: soreness   Functional change: no change reported     Pain: 0/10  Location: right calf       OBJECTIVE     Objective Measures updated at progress report unless specified.                   Circumference swelling:      of ankle at malleolus:              right 29 cm                                                                             8 cm inferior to fibular head:   right 39.0 cm                    AROM    Right (degrees) Left (degrees)   Plantar Flexion (50) 50 with in normal limits     Dorsiflexion (20) -5/ (PROM 3) with in normal limits     Ankle Inversion (35) 31 with in normal limits     Ankle Eversion (25) 10 with in normal limits       Lower Extremity Strength   Right LE  Left LE    Knee extension: 5/5 Knee extension: 5/5   Knee flexion: 5/5 Knee flexion: 5/5   Hip flexion: 5/5 Hip  "flexion: 5/5   Hip abduction: 5/5 Hip abduction: 5/5   Hip adduction: 5/5 Hip adduction 5/5   Ankle dorsiflexion: 5/5 Ankle dorsiflexion: 5/5   Ankle plantarflexion: 5/5 Ankle plantarflexion: 5/5         Treatment   (exercises performed today are bolded)    Shama received the treatments listed below:      therapeutic exercises to develop strength, endurance, ROM and flexibility for 25 minutes including:  recumbent bike for increased lower extremity range of motion, endurance, and strength (seat 14) for 5 minutes, resistance 4   Standing stand board stretch 3 x 30"   Long sitting gastroc 3 x 30" each   +test and measures listed above  Deferred:   Long soleus stretch 5 x 15" each   blue theraband ankle inversion, eversion, and dorsiflexion 2 x 10 each   Ankle ABCs x 1   Supine hamstring stretch 3 x 15"   LAQ 2 x 10   Bridges 2 x 10 with blue theraband   Steam boats x 10 each   Heel/toe raises 2 x 10   lunges in // to airex pad 2 x 10 each   side stepping 2 x laps with blue theraband   monster walks 2 laps with blue theraband       manual therapy techniques: Myofacial release and Soft tissue Mobilization were applied to the: right lower extremity for 20 minutes, including:  IASTYM of right gastroc , heel, and plantar fascia   silicone cupping for increased myofascial release of right gastroc     neuromuscular re-education activities to improve: Balance, Coordination and Proprioception for 0 minutes. The following activities were included:    Deferred:   Marches on foam 3 x 30"    Airex step overs x10   single leg balance on tramp 3 x 30"     gait training to improve functional mobility and safety for 0  minutes, including:    Cold pack to right calf for decreased pain and swelling for 15 minutes     Patient Education and Home Exercises     Home Exercises Provided and Patient Education Provided     Education provided:   - Education/Self-Care provided:   · Patient educated on the impairments noted above and the " effects of physical therapy intervention to improve overall condition and QOL.   · Patient was educated on all the above exercise prior/during/after for proper posture, positioning, and execution for safe performance with home exercise program.   · Patient educated on the importance of strong core and lower extremity musculature in order to improve both static and dynamic balance, improve gait mechanics, and improve household and community mobility    Written Home Exercises Provided: Patient instructed to cont prior HEP. Exercises were reviewed and Shama was able to demonstrate them prior to the end of the session.  Shama demonstrated good  understanding of the education provided. See EMR under Patient Instructions for exercises provided during therapy sessions    ASSESSMENT     Patient presents with increased calf pain and swelling after the last weekend. Patient presents with increased right ankle dorsiflexion and inversion. Patient presents with decreased superior calf swelling, but increased lower ankle swelling via measurements. Patient presents with full lower extremity strength and increased functional tolerance via decreased FOTO limitation score. Patient has met 4 out of 8 goals and progressing well with therapy. Continue to progress patient towards prior level of function according to tolerance.     Shama Is progressing well towards his goals.   Pt prognosis is Good.     Pt will continue to benefit from skilled outpatient physical therapy to address the deficits listed in the problem list box on initial evaluation, provide pt/family education and to maximize pt's level of independence in the home and community environment.     Pt's spiritual, cultural and educational needs considered and pt agreeable to plan of care and goals.     Anticipated barriers to physical therapy: pain     Goals:   Short Term Goals:  4 weeks   1. Pain: Pt will demonstrate improved pain by reports of less than or equal to  7/10 worst pain on the verbal rating scale in order to progress toward maximal functional ability and improve QOL. MET 6/16/2022   2.  Mobility: Patient will present with full right ankle inversion and eversion in order to return to maximal functional potential and improve quality of life. PC   3.  HEP: Patient will demonstrate independence with HEP in order to progress toward functional independence. MET 6/16/2022      Long Term Goals:  8 weeks   1. Pain: Pt will demonstrate improved pain by reports of less than or equal to 5/10 worst pain on the verbal rating scale in order to progress toward maximal functional ability and improve QOL.  MET 6/30/2022   2. Function: Patient will demonstrate improved function as indicated by a functional limitation score of 30% on the FOTO. PC   3. Mobility: Patient will improve AROM of right ankle dorsiflexion to 10 degrees in order to return to maximal functional potential and improve quality of life. PC   4. Strength: Patient will improve strength to 5/5 in bilateral lower extremities in order to improve functional independence and quality of life. MET 7/5/2022   5. Patient will return to normal ADL's, IADL's, community involvement, recreational activities, and work-related activities with no pain and maximal function. PC      Goals Key:  PC= progressing/continue;        PM= partially met;             DC= discontinue      PLAN     Plan of care Certification: 6/7/2022 to 9/7/2022.     Cont PT per POC and progress pt as tolerated and appropriate.    Ira Simon, PT, DPT, PPCES  07/05/2022

## 2022-07-05 ENCOUNTER — CLINICAL SUPPORT (OUTPATIENT)
Dept: REHABILITATION | Facility: HOSPITAL | Age: 52
End: 2022-07-05
Payer: COMMERCIAL

## 2022-07-05 DIAGNOSIS — M25.673 DECREASED RANGE OF MOTION OF ANKLE, UNSPECIFIED LATERALITY: Primary | ICD-10-CM

## 2022-07-05 PROCEDURE — 97140 MANUAL THERAPY 1/> REGIONS: CPT | Mod: PN

## 2022-07-05 PROCEDURE — 97110 THERAPEUTIC EXERCISES: CPT | Mod: PN

## 2022-07-05 NOTE — PROGRESS NOTES
"[facility]   Physical Therapy Treatment Note     Name: Jimi Clark  Clinic Number: [id number]    Therapy Diagnosis:   Encounter Diagnosis   Name Primary?    Decreased range of motion of ankle, unspecified laterality Yes     Physician: Blanca Ibarra FNP-C    Visit Date: 7/7/2022      Physician Orders: PT Eval and Treat   Medical Diagnosis from Referral:   S89.91XD (ICD-10-CM) - Injury of right lower extremity, subsequent encounter   S80.811D (ICD-10-CM) - Abrasion of right lower extremity, subsequent encounter   M79.661,M79.89 (ICD-10-CM) - Pain and swelling of right lower leg      Evaluation Date: 6/7/2022  Authorization Period Expiration: 6/3/2023  Plan of Care Expiration: 9/7/2022  Visit # / Visits authorized: 7/ 24 (+eval)     PN DUE: 8/5/2022     Precautions: Standard and hypertension     PTA Visit #: 0/5     Time In: 8:30 am   Time Out: 9:10 am   Total Billable Time: 40 minutes (+15 minutes of cold pack)    SUBJECTIVE     Pt reports: no new complaints.   He was compliant with home exercise program.  Response to previous treatment: soreness   Functional change: no change reported     Pain: 2/10  Location: right calf       OBJECTIVE     Objective Measures updated at progress report unless specified.     Treatment   (exercises performed today are bolded)    Jimi received the treatments listed below:      therapeutic exercises to develop strength, endurance, ROM and flexibility for 15 minutes including:  recumbent bike for increased lower extremity range of motion, endurance, and strength (seat 14) for 5 minutes, resistance 4   Standing stand board stretch 3 x 30"   Heel/toe raises 2 x 10   lunges in // to airex pad 2 x 10 each   side stepping 2 x laps with blue theraband   monster walks 2 laps with blue theraband  + step up high knees 2 x 10     Deferred:   Long sitting gastroc 3 x 30" each   Long soleus stretch 5 x 15" each   blue theraband ankle inversion, eversion, " "and dorsiflexion 2 x 10 each   Ankle ABCs x 1   Supine hamstring stretch 3 x 15"   LAQ 2 x 10   Bridges 2 x 10 with blue theraband   Steam boats x 10 each     manual therapy techniques: Myofacial release and Soft tissue Mobilization were applied to the: right lower extremity for 25 minutes, including:  IASTYM of right gastroc , heel, and plantar fascia   silicone cupping for increased myofascial release of right gastroc   + myofascial release to right gastroc    neuromuscular re-education activities to improve: Balance, Coordination and Proprioception for 0 minutes. The following activities were included:    Deferred:   Marches on foam 3 x 30"    Airex step overs x10   single leg balance on tramp 3 x 30"     gait training to improve functional mobility and safety for 0  minutes, including:    Cold pack to right calf for decreased pain and swelling for 15 minutes     Patient Education and Home Exercises     Home Exercises Provided and Patient Education Provided     Education provided:   - Education/Self-Care provided:   · Patient educated on the impairments noted above and the effects of physical therapy intervention to improve overall condition and QOL.   · Patient was educated on all the above exercise prior/during/after for proper posture, positioning, and execution for safe performance with home exercise program.   · Patient educated on the importance of strong core and lower extremity musculature in order to improve both static and dynamic balance, improve gait mechanics, and improve household and community mobility    Written Home Exercises Provided: Patient instructed to cont prior HEP. Exercises were reviewed and Shama was able to demonstrate them prior to the end of the session.  Shama demonstrated good  understanding of the education provided. See EMR under Patient Instructions for exercises provided during therapy sessions    ASSESSMENT     Patient presents with decreased calf pain. Step up high " knees was performed for dynamic ankle stability, endurance, and return to prior level of function. Myofascial release of right gastroc was performed for decreased gastroc hypertonicity and decreased scar tissue build up. Continue to progress patient towards prior level of function according to tolerance.     Shama Is progressing well towards his goals.   Pt prognosis is Good.     Pt will continue to benefit from skilled outpatient physical therapy to address the deficits listed in the problem list box on initial evaluation, provide pt/family education and to maximize pt's level of independence in the home and community environment.     Pt's spiritual, cultural and educational needs considered and pt agreeable to plan of care and goals.     Anticipated barriers to physical therapy: pain     Goals:   Short Term Goals:  4 weeks   1. Pain: Pt will demonstrate improved pain by reports of less than or equal to 7/10 worst pain on the verbal rating scale in order to progress toward maximal functional ability and improve QOL. MET 6/16/2022   2.  Mobility: Patient will present with full right ankle inversion and eversion in order to return to maximal functional potential and improve quality of life. PC   3.  HEP: Patient will demonstrate independence with HEP in order to progress toward functional independence. MET 6/16/2022      Long Term Goals:  8 weeks   1. Pain: Pt will demonstrate improved pain by reports of less than or equal to 5/10 worst pain on the verbal rating scale in order to progress toward maximal functional ability and improve QOL.  MET 6/30/2022   2. Function: Patient will demonstrate improved function as indicated by a functional limitation score of 30% on the FOTO. PC   3. Mobility: Patient will improve AROM of right ankle dorsiflexion to 10 degrees in order to return to maximal functional potential and improve quality of life. PC   4. Strength: Patient will improve strength to 5/5 in bilateral lower  extremities in order to improve functional independence and quality of life. MET 7/5/2022   5. Patient will return to normal ADL's, IADL's, community involvement, recreational activities, and work-related activities with no pain and maximal function. PC      Goals Key:  PC= progressing/continue;        PM= partially met;             DC= discontinue      PLAN     Plan of care Certification: 6/7/2022 to 9/7/2022.     Cont PT per POC and progress pt as tolerated and appropriate.    Ira Simon, PT, DPT, PPCES  07/07/2022

## 2022-07-06 ENCOUNTER — HOSPITAL ENCOUNTER (OUTPATIENT)
Facility: HOSPITAL | Age: 52
Discharge: HOME | End: 2022-07-06
Attending: SURGERY | Admitting: SURGERY
Payer: COMMERCIAL

## 2022-07-06 ENCOUNTER — ANESTHESIA (OUTPATIENT)
Dept: ENDOSCOPY | Facility: HOSPITAL | Age: 52
End: 2022-07-06
Payer: COMMERCIAL

## 2022-07-06 ENCOUNTER — ANESTHESIA EVENT (OUTPATIENT)
Dept: ENDOSCOPY | Facility: HOSPITAL | Age: 52
End: 2022-07-06
Payer: COMMERCIAL

## 2022-07-06 DIAGNOSIS — K63.5 POLYP OF SIGMOID COLON, UNSPECIFIED TYPE: Primary | ICD-10-CM

## 2022-07-06 PROCEDURE — 45380 COLONOSCOPY AND BIOPSY: CPT | Mod: 33,,, | Performed by: SURGERY

## 2022-07-06 PROCEDURE — 63600175 PHARM REV CODE 636 W HCPCS: Performed by: NURSE ANESTHETIST, CERTIFIED REGISTERED

## 2022-07-06 PROCEDURE — 37000009 HC ANESTHESIA EA ADD 15 MINS: Performed by: SURGERY

## 2022-07-06 PROCEDURE — 88305 TISSUE EXAM BY PATHOLOGIST: ICD-10-PCS | Mod: 26,,, | Performed by: PATHOLOGY

## 2022-07-06 PROCEDURE — 27201012 HC FORCEPS, HOT/COLD, DISP: Performed by: SURGERY

## 2022-07-06 PROCEDURE — 88305 TISSUE EXAM BY PATHOLOGIST: CPT | Performed by: PATHOLOGY

## 2022-07-06 PROCEDURE — 25000003 PHARM REV CODE 250: Performed by: SURGERY

## 2022-07-06 PROCEDURE — 45380 PR COLONOSCOPY,BIOPSY: ICD-10-PCS | Mod: 33,,, | Performed by: SURGERY

## 2022-07-06 PROCEDURE — 45380 COLONOSCOPY AND BIOPSY: CPT | Performed by: SURGERY

## 2022-07-06 PROCEDURE — 88305 TISSUE EXAM BY PATHOLOGIST: CPT | Mod: 26,,, | Performed by: PATHOLOGY

## 2022-07-06 PROCEDURE — 37000008 HC ANESTHESIA 1ST 15 MINUTES: Performed by: SURGERY

## 2022-07-06 PROCEDURE — 25000003 PHARM REV CODE 250: Performed by: NURSE ANESTHETIST, CERTIFIED REGISTERED

## 2022-07-06 RX ORDER — LIDOCAINE HYDROCHLORIDE 20 MG/ML
INJECTION INTRAVENOUS
Status: DISCONTINUED | OUTPATIENT
Start: 2022-07-06 | End: 2022-07-06

## 2022-07-06 RX ORDER — SODIUM CHLORIDE 9 MG/ML
INJECTION, SOLUTION INTRAVENOUS CONTINUOUS
Status: DISCONTINUED | OUTPATIENT
Start: 2022-07-06 | End: 2022-07-06 | Stop reason: HOSPADM

## 2022-07-06 RX ORDER — PROPOFOL 10 MG/ML
VIAL (ML) INTRAVENOUS
Status: DISCONTINUED | OUTPATIENT
Start: 2022-07-06 | End: 2022-07-06

## 2022-07-06 RX ORDER — DEXTROMETHORPHAN/PSEUDOEPHED 2.5-7.5/.8
DROPS ORAL
Status: COMPLETED | OUTPATIENT
Start: 2022-07-06 | End: 2022-07-06

## 2022-07-06 RX ADMIN — PROPOFOL 50 MG: 10 INJECTION, EMULSION INTRAVENOUS at 09:07

## 2022-07-06 RX ADMIN — SODIUM CHLORIDE, SODIUM LACTATE, POTASSIUM CHLORIDE, AND CALCIUM CHLORIDE: .6; .31; .03; .02 INJECTION, SOLUTION INTRAVENOUS at 09:07

## 2022-07-06 RX ADMIN — PROPOFOL 100 MG: 10 INJECTION, EMULSION INTRAVENOUS at 09:07

## 2022-07-06 RX ADMIN — SIMETHICONE 40 MG: 20 SUSPENSION/ DROPS ORAL at 09:07

## 2022-07-06 RX ADMIN — Medication 40 MG: at 09:07

## 2022-07-06 NOTE — H&P
PRE PROCEDURE H&P    Patient Name: Jimi Clark  MRN: [id number]  : 1970  Date of Procedure:  2022  Referring Physician: Demi Guzman DO  Primary Physician: Mack Beebe MD  Procedure Physician: Demi Guzman DO      Planned Procedure: Colonoscopy  Diagnosis: previous adenomatous polyp  Chief Complaint: Same as above    HPI: Patient is an 51 y.o. male is here for the above.     Last colonoscopy: 2018  Family history: Denies colorectal cancers  Anticoagulation: None    Past Medical History:   Past Medical History:   Diagnosis Date    Back pain     Dr. 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 Authorizing Provider   amLODIPine (NORVASC) 10 MG tablet Take 1 tablet (10 mg total) by mouth once daily. 21 Yes Mack Beebe MD   betamethasone valerate (LUXIQ) 0.12 % foam 2 (two) times daily. 17  Yes Historical Provider   CELACYN GlwP APPLY 1 GRAM ON THE SKIN BID AA 18  Yes Historical Provider   desoximetasone 0.05 % Oint EOLDIA TO CHEST BID 17  Yes Historical Provider   sildenafiL (VIAGRA) 25 MG tablet CHEW 1 TO 2 TABLETS BY MOUTH 30 TO 45 MINUTES BEFORE SEXUAL ACTIVITY AS NEEDED 3/29/22  Yes Historical Provider   TEXACORT 2.5 % Soln APPLY ON THE SKIN QOD WITH QTIP 18  Yes Historical Provider   azelastine-fluticasone 137-50 mcg/spray [place] 1 spray by Nasal route. 3/9/16   Historical Provider   celecoxib (CELEBREX) 200 MG capsule Take 200 mg by mouth 2 (two) times daily.  "22   Historical Provider   cyclobenzaprine (FLEXERIL) 10 MG tablet Take 10 mg by mouth daily as needed. 22   Historical Provider   GRALISE 600 mg Tb24 SMARTSI Tablet(s) By Mouth Every Evening 22   Historical Provider   ketoconazole (NIZORAL) 2 % shampoo  6/10/21   Historical Provider   neomycin-polymyxin-dexamethasone (DEXACINE) 3.5 mg/g-10,000 unit/g-0.1 % Oint SMARTSI.125 Inch(es) In Eye(s) Twice Daily 20   Historical Provider   polyethylene glycol (GLYCOLAX) 17 gram PwPk 1 pack every other day for a couple of weeks to regulate bowels 18   KAIT Beckham        Allergies:  Review of patient's allergies indicates:  No Known Allergies     Social History:   Social History     Socioeconomic History    Marital status:     Number of children: 3   Occupational History    Occupation:    Tobacco Use    Smoking status: Never Smoker    Smokeless tobacco: Never Used   Substance and Sexual Activity    Alcohol use: No    Drug use: No    Sexual activity: Yes     Partners: Female   Social History Narrative     with 3 kids and is an  with [employer]       Family History:  Family History   Problem Relation Age of Onset    Alcohol abuse Mother     Stroke Mother     Heart disease Mother     Heart disease Father     Stroke Father     Heart disease Maternal Aunt     Heart attack Maternal Aunt 59    Heart disease Maternal Grandmother     Cancer Maternal Grandfather         prostate       ROS: No acute cardiac events, no acute respiratory complaints.     Physical Exam (all patients):    BP (!) 148/89 (BP Location: Left arm, Patient Position: Lying)   Pulse 66   Temp 98.1 °F (36.7 °C) (Temporal)   Resp 20   Ht 6' 3" (1.905 m)   Wt 108.9 kg (240 lb)   SpO2 99%   BMI 30.00 kg/m²   Lungs: Clear to auscultation bilaterally, respirations unlabored  Heart: Regular rate and rhythm, S1 and S2 normal, no obvious murmurs  Abdomen:         Soft, non-tender, " bowel sounds normal, no masses, no organomegaly    Lab Results   Component Value Date    WBC 5.91 06/18/2021    MCV 83 06/18/2021    RDW 13.7 06/18/2021     06/18/2021    GLU 92 06/03/2022    HGBA1C 5.1 06/03/2022    BUN 10 06/03/2022     06/03/2022    K 4.1 06/03/2022     06/03/2022        SEDATION PLAN: per anesthesia      History reviewed, vital signs satisfactory, cardiopulmonary status satisfactory, sedation options, risks and plans have been discussed with the patient  All their questions were answered and the patient agrees to the sedation procedures as planned and the patient is deemed an appropriate candidate for the sedation as planned.    Procedure explained to patient, informed consent obtained and placed in chart.    Demi Guzman, DO  General Surgery  [facility]  7/6/2022

## 2022-07-06 NOTE — ANESTHESIA PREPROCEDURE EVALUATION
07/06/2022  Jimijoie Clark is a 51 y.o., male.    Pre-op Assessment    I have reviewed the Patient Summary Reports.    I have reviewed the Nursing Notes. I have reviewed the NPO Status.   I have reviewed the Medications.     Review of Systems  Anesthesia Hx:  No problems with previous Anesthesia  History of prior surgery of interest to airway management or planning: Denies Family Hx of Anesthesia complications.   Denies Personal Hx of Anesthesia complications.   Social:  Non-Smoker    Hematology/Oncology:  Hematology Normal   Oncology Normal     Cardiovascular:   Hypertension, well controlled    Pulmonary:  Pulmonary Normal    Renal/:  Renal/ Normal     Hepatic/GI:   Bowel Prep.    Musculoskeletal:  Spine Disorders: Chronic Pain    Neurological:  Neurology Normal    Endocrine:  Endocrine Normal    Psych:  Psychiatric Normal           Physical Exam  General:  Well nourished      Airway/Jaw/Neck:  Airway Findings: Mouth Opening: Normal   Tongue: Normal   General Airway Assessment: Adult Mallampati: II  Improves to II with phonation.  TM Distance: Normal, at least 6 cm       Dental:  Intact     Chest/Lungs:  Chest/Lungs Findings: Normal Respiratory Rate      Heart/Vascular:  Heart Findings: Rate: Normal        Mental Status:  Mental Status Findings:  Cooperative, Alert and Oriented         Anesthesia Plan  Type of Anesthesia, risks & benefits discussed:  Anesthesia Type:  MAC    Patient's Preference:   Plan Factors:          Intra-op Monitoring Plan: Standard ASA Monitors  Intra-op Monitoring Plan Comments:   Post Op Pain Control Plan: multimodal analgesia  Post Op Pain Control Plan Comments:     Induction:   IV  Beta Blocker:  Patient is not currently on a Beta-Blocker (No further documentation required).       Informed Consent: Informed consent signed with the Patient and all parties understand the  risks and agree with anesthesia plan.  All questions answered.  Anesthesia consent signed with patient.  ASA Score: 2     Day of Surgery Review of History & Physical: I have interviewed and examined the patient. I have reviewed the patient's H&P dated:  There are no significant changes.  H&P Update referred to the surgeon/provider.          Ready For Surgery From Anesthesia Perspective.           Physical Exam  General: Well nourished    Airway:  Mallampati: II / II  Mouth Opening: Normal  TM Distance: Normal, at least 6 cm  Tongue: Normal    Dental:  Intact    Chest/Lungs:  Normal Respiratory Rate    Heart:  Rate: Normal          Anesthesia Plan  Type of Anesthesia, risks & benefits discussed:    Anesthesia Type: MAC  Intra-op Monitoring Plan: Standard ASA Monitors  Post Op Pain Control Plan: multimodal analgesia  Induction:  IV  Informed Consent: Informed consent signed with the Patient and all parties understand the risks and agree with anesthesia plan.  All questions answered.   ASA Score: 2  Day of Surgery Review of History & Physical: H&P Update referred to the surgeon/provider.I have interviewed and examined the patient. I have reviewed the patient's H&P dated: There are no significant changes.     Ready For Surgery From Anesthesia Perspective.       .

## 2022-07-06 NOTE — TRANSFER OF CARE
"Anesthesia Transfer of Care Note    Patient: Jimi Clark    Procedure(s) Performed: Procedure(s) (LRB):  COLONOSCOPY (N/A)    Patient location: GI    Anesthesia Type: MAC    Transport from OR: Transported from OR on room air with adequate spontaneous ventilation    Post pain: adequate analgesia    Post assessment: no apparent anesthetic complications    Post vital signs: stable    Level of consciousness: sedated    Nausea/Vomiting: no nausea/vomiting    Complications: none    Transfer of care protocol was followed      Last vitals:   Visit Vitals  BP (!) 148/89 (BP Location: Left arm, Patient Position: Lying)   Pulse 66   Temp 36.7 °C (98.1 °F) (Temporal)   Resp 20   Ht 6' 3" (1.905 m)   Wt 108.9 kg (240 lb)   SpO2 99%   BMI 30.00 kg/m²     "

## 2022-07-06 NOTE — ANESTHESIA POSTPROCEDURE EVALUATION
Anesthesia Post Evaluation    Patient: Jimi Clark    Procedure(s) Performed: Procedure(s) (LRB):  COLONOSCOPY (N/A)    Final Anesthesia Type: MAC      Patient location during evaluation: GI PACU  Patient participation: Yes- Able to Participate  Level of consciousness: awake and alert  Post-procedure vital signs: reviewed and stable  Pain management: adequate  Airway patency: patent    PONV status at discharge: No PONV  Anesthetic complications: no      Cardiovascular status: blood pressure returned to baseline  Respiratory status: unassisted and spontaneous ventilation  Hydration status: euvolemic  Follow-up not needed.          Vitals Value Taken Time   /94 07/06/22 1009   Temp  07/06/22 1056   Pulse 59 07/06/22 1009   Resp 18 07/06/22 1009   SpO2 99 % 07/06/22 1009         Event Time   Out of Recovery 10:19:59         Pain/Rupali Score: Rupali Score: 10 (7/6/2022 10:09 AM)

## 2022-07-06 NOTE — PROVATION PATIENT INSTRUCTIONS
Discharge Summary/Instructions after an Endoscopic Procedure  Patient Name: Jimi Clark  Patient MRN: [id number]  Patient YOB: 1970 Wednesday, July 6, 2022 Demi Guzman DO  Dear patient,  As a result of recent federal legislation (The Federal Cures Act), you may   receive lab or pathology results from your procedure in your [facility]   account before your physician is able to contact you. Your physician or   their representative will relay the results to you with their   recommendations at their soonest availability.  Thank you,  RESTRICTIONS:  During your procedure today, you received medications for sedation.  These   medications may affect your judgment, balance and coordination.  Therefore,   for 24 hours, you have the following restrictions:   - DO NOT drive a car, operate machinery, make legal/financial decisions,   sign important papers or drink alcohol.    ACTIVITY:  Today: no heavy lifting, straining or running due to procedural   sedation/anesthesia.  The following day: return to full activity including work.  DIET:  Eat and drink normally unless instructed otherwise.     TREATMENT FOR COMMON SIDE EFFECTS:  - Mild abdominal pain, nausea, belching, bloating or excessive gas:  rest,   eat lightly and use a heating pad.  - Sore Throat: treat with throat lozenges and/or gargle with warm salt   water.  - Because air was used during the procedure, expelling large amounts of air   from your rectum or belching is normal.  - If a bowel prep was taken, you may not have a bowel movement for 1-3 days.    This is normal.  SYMPTOMS TO WATCH FOR AND REPORT TO YOUR PHYSICIAN:  1. Abdominal pain or bloating, other than gas cramps.  2. Chest pain.  3. Back pain.  4. Signs of infection such as: chills or fever occurring within 24 hours   after the procedure.  5. Rectal bleeding, which would show as bright red, maroon, or black stools.   (A tablespoon of blood from the rectum is not serious, especially  if   hemorrhoids are present.)  6. Vomiting.  7. Weakness or dizziness.  GO DIRECTLY TO THE NEAREST EMERGENCY ROOM IF YOU HAVE ANY OF THE FOLLOWING:      Difficulty breathing              Chills and/or fever over 101 F   Persistent vomiting and/or vomiting blood   Severe abdominal pain   Severe chest pain   Black, tarry stools   Bleeding- more than one tablespoon   Any other symptom or condition that you feel may need urgent attention  Your doctor recommends these additional instructions:  If any biopsies were taken, your doctors clinic will contact you in 1 to 2   weeks with any results.  - Patient has a contact number available for emergencies.  The signs and   symptoms of potential delayed complications were discussed with the   patient.  Return to normal activities tomorrow.  Written discharge   instructions were provided to the patient.   - Resume previous diet.   - Discharge patient to home (ambulatory).   - Continue present medications.   - Repeat colonoscopy in 5 years for surveillance.   - Return to GI clinic PRN.  For questions, problems or results please call your physician Demi Guzman, DO at Work:  [phone]  If you have any questions about the above instructions, call the GI   department at [phone] or call the endoscopy unit at [phone]   from 7am until 3 pm.  [facility], EMERGENCY ROOM PHONE NUMBER:   [phone]  IF A COMPLICATION OR EMERGENCY SITUATION ARISES AND YOU ARE UNABLE TO REACH   YOUR PHYSICIAN - GO DIRECTLY TO THE EMERGENCY ROOM.  I have read or have had read to me these discharge instructions for my   procedure and have received a written copy.  I understand these   instructions and will follow-up with my physician if I have any questions.     __________________________________       _____________________________________  Nurse Signature                                          Patient/Designated   Responsible Party Signature  Demi LEONARD  DO Megan  7/6/2022 9:46:33 AM  This report has been verified and signed electronically.  Dear patient,  As a result of recent federal legislation (The Federal Cures Act), you may   receive lab or pathology results from your procedure in your [facility]   account before your physician is able to contact you. Your physician or   their representative will relay the results to you with their   recommendations at their soonest availability.  Thank you,  PROVATION

## 2022-07-06 NOTE — DISCHARGE SUMMARY
[facility] (Hospital)  Discharge Note  Short Stay    Procedure(s) (LRB):  COLONOSCOPY (N/A)    OUTCOME: Patient tolerated treatment/procedure well without complication and is now ready for discharge.    DISPOSITION: Home or Self Care    FINAL DIAGNOSIS:  <principal problem not specified>    FOLLOWUP: With primary care provider    DISCHARGE INSTRUCTIONS:  No discharge procedures on file.      Clinical Reference Documents Added to Patient Instructions       Document    COLON POLYPECTOMY DISCHARGE INSTRUCTIONS (ENGLISH)    HEMORRHOIDS (ENGLISH)          TIME SPENT ON DISCHARGE: 5 minutes

## 2022-07-07 ENCOUNTER — CLINICAL SUPPORT (OUTPATIENT)
Dept: REHABILITATION | Facility: HOSPITAL | Age: 52
End: 2022-07-07
Payer: COMMERCIAL

## 2022-07-07 VITALS
WEIGHT: 240 LBS | SYSTOLIC BLOOD PRESSURE: 154 MMHG | HEIGHT: 75 IN | HEART RATE: 59 BPM | BODY MASS INDEX: 29.84 KG/M2 | OXYGEN SATURATION: 99 % | TEMPERATURE: 98 F | RESPIRATION RATE: 18 BRPM | DIASTOLIC BLOOD PRESSURE: 94 MMHG

## 2022-07-07 DIAGNOSIS — M25.673 DECREASED RANGE OF MOTION OF ANKLE, UNSPECIFIED LATERALITY: Primary | ICD-10-CM

## 2022-07-07 PROCEDURE — 97140 MANUAL THERAPY 1/> REGIONS: CPT | Mod: PN

## 2022-07-07 PROCEDURE — 97110 THERAPEUTIC EXERCISES: CPT | Mod: PN

## 2022-07-08 LAB
FINAL PATHOLOGIC DIAGNOSIS: NORMAL
GROSS: NORMAL
Lab: NORMAL

## 2022-07-15 ENCOUNTER — PATIENT MESSAGE (OUTPATIENT)
Dept: INTERNAL MEDICINE | Facility: CLINIC | Age: 52
End: 2022-07-15
Payer: COMMERCIAL

## 2022-07-15 ENCOUNTER — OFFICE VISIT (OUTPATIENT)
Dept: INTERNAL MEDICINE | Facility: CLINIC | Age: 52
End: 2022-07-15
Payer: COMMERCIAL

## 2022-07-15 VITALS
DIASTOLIC BLOOD PRESSURE: 80 MMHG | BODY MASS INDEX: 30.7 KG/M2 | TEMPERATURE: 98 F | HEART RATE: 63 BPM | SYSTOLIC BLOOD PRESSURE: 130 MMHG | HEIGHT: 75 IN | WEIGHT: 246.94 LBS

## 2022-07-15 DIAGNOSIS — I10 ESSENTIAL HYPERTENSION: ICD-10-CM

## 2022-07-15 DIAGNOSIS — R97.20 ELEVATED PSA: ICD-10-CM

## 2022-07-15 DIAGNOSIS — S80.811D ABRASION OF RIGHT LOWER EXTREMITY, SUBSEQUENT ENCOUNTER: ICD-10-CM

## 2022-07-15 DIAGNOSIS — S89.91XD INJURY OF RIGHT LOWER EXTREMITY, SUBSEQUENT ENCOUNTER: Primary | ICD-10-CM

## 2022-07-15 PROCEDURE — 3079F DIAST BP 80-89 MM HG: CPT | Mod: CPTII,S$GLB,, | Performed by: NURSE PRACTITIONER

## 2022-07-15 PROCEDURE — 1160F PR REVIEW ALL MEDS BY PRESCRIBER/CLIN PHARMACIST DOCUMENTED: ICD-10-PCS | Mod: CPTII,S$GLB,, | Performed by: NURSE PRACTITIONER

## 2022-07-15 PROCEDURE — 3044F HG A1C LEVEL LT 7.0%: CPT | Mod: CPTII,S$GLB,, | Performed by: NURSE PRACTITIONER

## 2022-07-15 PROCEDURE — 99999 PR PBB SHADOW E&M-EST. PATIENT-LVL IV: ICD-10-PCS | Mod: PBBFAC,,, | Performed by: NURSE PRACTITIONER

## 2022-07-15 PROCEDURE — 1160F RVW MEDS BY RX/DR IN RCRD: CPT | Mod: CPTII,S$GLB,, | Performed by: NURSE PRACTITIONER

## 2022-07-15 PROCEDURE — 3008F BODY MASS INDEX DOCD: CPT | Mod: CPTII,S$GLB,, | Performed by: NURSE PRACTITIONER

## 2022-07-15 PROCEDURE — 3075F PR MOST RECENT SYSTOLIC BLOOD PRESS GE 130-139MM HG: ICD-10-PCS | Mod: CPTII,S$GLB,, | Performed by: NURSE PRACTITIONER

## 2022-07-15 PROCEDURE — 99214 OFFICE O/P EST MOD 30 MIN: CPT | Mod: S$GLB,,, | Performed by: NURSE PRACTITIONER

## 2022-07-15 PROCEDURE — 3044F PR MOST RECENT HEMOGLOBIN A1C LEVEL <7.0%: ICD-10-PCS | Mod: CPTII,S$GLB,, | Performed by: NURSE PRACTITIONER

## 2022-07-15 PROCEDURE — 1159F PR MEDICATION LIST DOCUMENTED IN MEDICAL RECORD: ICD-10-PCS | Mod: CPTII,S$GLB,, | Performed by: NURSE PRACTITIONER

## 2022-07-15 PROCEDURE — 3079F PR MOST RECENT DIASTOLIC BLOOD PRESSURE 80-89 MM HG: ICD-10-PCS | Mod: CPTII,S$GLB,, | Performed by: NURSE PRACTITIONER

## 2022-07-15 PROCEDURE — 1159F MED LIST DOCD IN RCRD: CPT | Mod: CPTII,S$GLB,, | Performed by: NURSE PRACTITIONER

## 2022-07-15 PROCEDURE — 99999 PR PBB SHADOW E&M-EST. PATIENT-LVL IV: CPT | Mod: PBBFAC,,, | Performed by: NURSE PRACTITIONER

## 2022-07-15 PROCEDURE — 99214 PR OFFICE/OUTPT VISIT, EST, LEVL IV, 30-39 MIN: ICD-10-PCS | Mod: S$GLB,,, | Performed by: NURSE PRACTITIONER

## 2022-07-15 PROCEDURE — 3008F PR BODY MASS INDEX (BMI) DOCUMENTED: ICD-10-PCS | Mod: CPTII,S$GLB,, | Performed by: NURSE PRACTITIONER

## 2022-07-15 PROCEDURE — 3075F SYST BP GE 130 - 139MM HG: CPT | Mod: CPTII,S$GLB,, | Performed by: NURSE PRACTITIONER

## 2022-07-15 NOTE — PROGRESS NOTES
"Subjective:       Patient ID: Jimi Clark is a 51 y.o. male.    Chief Complaint: Follow-up    Patient here for follow up  Hurt right leg 5/10  Has been out of work since  Had wound to right shin that has healed  Had lingering calf pain/swelling, has been doing physical therapy which is helping  Feels he is ready to return to work   Has upcoming wellness with Dr. SMITH Needs to see urology as psa went up this year. Grandfather had hx of prostate cancer    Follow-up  Pertinent negatives include no arthralgias, chest pain, headaches, joint swelling, neck pain, vomiting or weakness.       /80   Pulse 63   Temp 97.6 °F (36.4 °C)   Ht 6' 3" (1.905 m)   Wt 112 kg (246 lb 14.6 oz)   BMI 30.86 kg/m²     Review of Systems   Constitutional: Negative for activity change and unexpected weight change.   HENT: Negative for hearing loss, rhinorrhea and trouble swallowing.    Eyes: Negative for discharge and visual disturbance.   Respiratory: Negative for chest tightness and wheezing.    Cardiovascular: Negative for chest pain and palpitations.   Gastrointestinal: Negative for blood in stool, constipation, diarrhea and vomiting.   Endocrine: Negative for polydipsia and polyuria.   Genitourinary: Negative for difficulty urinating, hematuria and urgency.   Musculoskeletal: Negative for arthralgias, joint swelling and neck pain.   Skin: Positive for wound.   Neurological: Negative for weakness and headaches.   Psychiatric/Behavioral: Negative for confusion and dysphoric mood.       Objective:      Physical Exam  Constitutional:       General: He is not in acute distress.     Appearance: He is well-developed. He is not ill-appearing, toxic-appearing or diaphoretic.   HENT:      Head: Normocephalic and atraumatic.      Right Ear: External ear normal.      Left Ear: External ear normal.      Nose: Nose normal.   Eyes:      General: Lids are normal. No scleral icterus.        Right eye: No discharge.         Left eye: No " discharge.      Conjunctiva/sclera: Conjunctivae normal.   Pulmonary:      Effort: Pulmonary effort is normal. No tachypnea, accessory muscle usage or respiratory distress.      Breath sounds: No stridor.   Musculoskeletal:      Cervical back: Full passive range of motion without pain.      Comments: Healed wound to right shin with some hyperpigmented. Small scab to center   Skin:     Coloration: Skin is not pale.   Neurological:      Mental Status: He is alert. He is not disoriented.   Psychiatric:         Attention and Perception: He is attentive.         Mood and Affect: Mood is not anxious or depressed. Affect is not labile, blunt, angry or inappropriate.         Speech: Speech normal.         Behavior: Behavior normal.         Thought Content: Thought content normal.         Judgment: Judgment normal.         Assessment:       1. Injury of right lower extremity, subsequent encounter    2. Abrasion of right lower extremity, subsequent encounter    3. Elevated PSA    4. Essential hypertension        Plan:       Jimi was seen today for follow-up.    Diagnoses and all orders for this visit:    Injury of right lower extremity, subsequent encounter  Abrasion of right lower extremity, subsequent encounter  Healing well  Did physical therapy  I cleared patient to return to work several weeks ago with some limited activity restrictions however, he states that his work refused to accommodate his restrictions so he has not returned  la papers filled out again  Ok to return to work Monday 7/18/22 with no restrictions    Elevated PSA  -     Ambulatory referral/consult to Urology; Future  Dr. TODD Scott per patient request    Essential hypertension  Stable on amlodipine    Due for annual with Dr. WEBB

## 2022-07-15 NOTE — TELEPHONE ENCOUNTER
1)fax urology referral    2) please give letter patient may return to work 7/18 with no restrictions. He can print it from the portal

## 2022-07-18 NOTE — PROGRESS NOTES
"[facility]   Physical Therapy Treatment Note     Name: Jimi Clark  Clinic Number: [id number]    Therapy Diagnosis:   Encounter Diagnosis   Name Primary?    Decreased range of motion of right ankle Yes     Physician: Blanca Ibarra FNP-C    Visit Date: 7/19/2022      Physician Orders: PT Eval and Treat   Medical Diagnosis from Referral:   S89.91XD (ICD-10-CM) - Injury of right lower extremity, subsequent encounter   S80.811D (ICD-10-CM) - Abrasion of right lower extremity, subsequent encounter   M79.661,M79.89 (ICD-10-CM) - Pain and swelling of right lower leg      Evaluation Date: 6/7/2022  Authorization Period Expiration: 6/3/2023  Plan of Care Expiration: 9/7/2022  Visit # / Visits authorized: 8/ 24 (+eval)     PN DUE: 8/5/2022     Precautions: Standard and hypertension     PTA Visit #: 0/5     Time In: 9:15 am   Time Out: 10:00 am   Total Billable Time: 45 minutes (+15 minutes of cold pack)    SUBJECTIVE     Pt reports: he feels 85-95% better. Patient reports he started back at work yesterday.   He was compliant with home exercise program.  Response to previous treatment: soreness   Functional change: no change reported     Pain: 2/10  Location: right calf       OBJECTIVE     Objective Measures updated at progress report unless specified.     Treatment   (exercises performed today are bolded)    Jimi received the treatments listed below:      therapeutic exercises to develop strength, endurance, ROM and flexibility for 27 minutes including:  recumbent bike for increased lower extremity range of motion, endurance, and strength (seat 14) for 5 minutes, resistance 4   Standing stand board stretch 3 x 30"   lunges in // to airex pad 2 x 10 each   step up high knees 2 x 10   + ladder drills lateral and forward 3 laps each   + ascend/descend stairs x 1 min     Deferred:   side stepping 2 x laps with blue theraband   monster walks 2 laps with blue theraband   Heel/toe raises 2 " "x 10  Long sitting gastroc 3 x 30" each   Long soleus stretch 5 x 15" each   blue theraband ankle inversion, eversion, and dorsiflexion 2 x 10 each   Ankle ABCs x 1   Supine hamstring stretch 3 x 15"   LAQ 2 x 10   Bridges 2 x 10 with blue theraband   Steam boats x 10 each     manual therapy techniques: Myofacial release and Soft tissue Mobilization were applied to the: right lower extremity for 15 minutes, including:  IASTYM of right gastroc , heel, and plantar fascia   silicone cupping for increased myofascial release of right gastroc   + myofascial release to right gastroc    neuromuscular re-education activities to improve: Balance, Coordination and Proprioception for 3 minutes. The following activities were included:    single leg balance on tramp 3 x 30"     Deferred:   Marches on foam 3 x 30"    Airex step overs x10       gait training to improve functional mobility and safety for 0  minutes, including:    Cold pack to right calf for decreased pain and swelling for 15 minutes     Patient Education and Home Exercises     Home Exercises Provided and Patient Education Provided     Education provided:   - Education/Self-Care provided:   · Patient educated on the impairments noted above and the effects of physical therapy intervention to improve overall condition and QOL.   · Patient was educated on all the above exercise prior/during/after for proper posture, positioning, and execution for safe performance with home exercise program.   · Patient educated on the importance of strong core and lower extremity musculature in order to improve both static and dynamic balance, improve gait mechanics, and improve household and community mobility    Written Home Exercises Provided: Patient instructed to cont prior HEP. Exercises were reviewed and Shama was able to demonstrate them prior to the end of the session.  Shama demonstrated good  understanding of the education provided. See EMR under Patient Instructions " for exercises provided during therapy sessions    ASSESSMENT     Patient presents with decreased calf pain and returning to work thiswek. Agility drills and stairs were added today for coordination, endurance, and return to function. Continue to progress patient towards prior level of function according to tolerance.     Shama Is progressing well towards his goals.   Pt prognosis is Good.     Pt will continue to benefit from skilled outpatient physical therapy to address the deficits listed in the problem list box on initial evaluation, provide pt/family education and to maximize pt's level of independence in the home and community environment.     Pt's spiritual, cultural and educational needs considered and pt agreeable to plan of care and goals.     Anticipated barriers to physical therapy: pain     Goals:   Short Term Goals:  4 weeks   1. Pain: Pt will demonstrate improved pain by reports of less than or equal to 7/10 worst pain on the verbal rating scale in order to progress toward maximal functional ability and improve QOL. MET 6/16/2022   2.  Mobility: Patient will present with full right ankle inversion and eversion in order to return to maximal functional potential and improve quality of life. PC   3.  HEP: Patient will demonstrate independence with HEP in order to progress toward functional independence. MET 6/16/2022      Long Term Goals:  8 weeks   1. Pain: Pt will demonstrate improved pain by reports of less than or equal to 5/10 worst pain on the verbal rating scale in order to progress toward maximal functional ability and improve QOL.  MET 6/30/2022   2. Function: Patient will demonstrate improved function as indicated by a functional limitation score of 30% on the FOTO. PC   3. Mobility: Patient will improve AROM of right ankle dorsiflexion to 10 degrees in order to return to maximal functional potential and improve quality of life. PC   4. Strength: Patient will improve strength to 5/5 in  bilateral lower extremities in order to improve functional independence and quality of life. MET 7/5/2022   5. Patient will return to normal ADL's, IADL's, community involvement, recreational activities, and work-related activities with no pain and maximal function. PC      Goals Key:  PC= progressing/continue;        PM= partially met;             DC= discontinue      PLAN     Plan of care Certification: 6/7/2022 to 9/7/2022.     Cont PT per POC and progress pt as tolerated and appropriate.    Ira Simon, PT, DPT, PPCES  07/19/2022

## 2022-07-19 ENCOUNTER — CLINICAL SUPPORT (OUTPATIENT)
Dept: REHABILITATION | Facility: HOSPITAL | Age: 52
End: 2022-07-19
Payer: COMMERCIAL

## 2022-07-19 DIAGNOSIS — M25.671 DECREASED RANGE OF MOTION OF RIGHT ANKLE: Primary | ICD-10-CM

## 2022-07-19 PROCEDURE — 97110 THERAPEUTIC EXERCISES: CPT | Mod: PN

## 2022-07-19 PROCEDURE — 97140 MANUAL THERAPY 1/> REGIONS: CPT | Mod: PN

## 2022-07-20 NOTE — PROGRESS NOTES
"[facility]   Physical Therapy Treatment Note     Name: Jimi Clark  Clinic Number: [id number]    Therapy Diagnosis:   Encounter Diagnosis   Name Primary?    Decreased range of motion of right ankle Yes     Physician: Blanca Ibarra FNP-C    Visit Date: 7/21/2022      Physician Orders: PT Eval and Treat   Medical Diagnosis from Referral:   S89.91XD (ICD-10-CM) - Injury of right lower extremity, subsequent encounter   S80.811D (ICD-10-CM) - Abrasion of right lower extremity, subsequent encounter   M79.661,M79.89 (ICD-10-CM) - Pain and swelling of right lower leg      Evaluation Date: 6/7/2022  Authorization Period Expiration: 6/3/2023  Plan of Care Expiration: 9/7/2022  Visit # / Visits authorized: 8/ 24 (+eval)     PN DUE: 8/5/2022     Precautions: Standard and hypertension     PTA Visit #: 0/5     Time In: 8:15 am   Time Out: 9:00 am   Total Billable Time: 45 minutes (+15 minutes of cold pack)    SUBJECTIVE     Pt reports: he almost didn't come today after being sore from moving boxes of tiles in the garage. Patient reports he is ready to make today his last day and will follow up if needed.   He was compliant with home exercise program.  Response to previous treatment: soreness   Functional change: no change reported     Pain: 0/10 calf, mild back pain        OBJECTIVE     Objective Measures updated at progress report unless specified.     Treatment   (exercises performed today are bolded)    Jimi received the treatments listed below:      therapeutic exercises to develop strength, endurance, ROM and flexibility for 35 minutes including:  recumbent bike for increased lower extremity range of motion, endurance, and strength (seat 14) for 5 minutes, resistance 4   Standing stand board stretch 3 x 30"   lunges in // to airex pad 2 x 10 each   step up high knees 2 x 10   ladder drills lateral and forward 3 laps each   ascend/descend stairs x 1 min   + single leg dead lifts 10# " "2 x 10   side stepping 2 x laps with blue theraband   monster walks 2 laps with blue theraband     Deferred:   Heel/toe raises 2 x 10  Long sitting gastroc 3 x 30" each   Long soleus stretch 5 x 15" each   blue theraband ankle inversion, eversion, and dorsiflexion 2 x 10 each   Ankle ABCs x 1   Supine hamstring stretch 3 x 15"   LAQ 2 x 10   Bridges 2 x 10 with blue theraband   Steam boats x 10 each     manual therapy techniques: Myofacial release and Soft tissue Mobilization were applied to the: right lower extremity for 10 minutes, including:  IASTYM of right gastroc , heel, and plantar fascia   silicone cupping for increased myofascial release of right gastroc   myofascial release to right gastroc with active movement     neuromuscular re-education activities to improve: Balance, Coordination and Proprioception for 0 minutes. The following activities were included:    single leg balance on tramp 3 x 30"   Marches on foam 3 x 30"    Airex step overs x10       gait training to improve functional mobility and safety for 0  minutes, including:    Cold pack to right calf for decreased pain and swelling for 15 minutes     Patient Education and Home Exercises     Home Exercises Provided and Patient Education Provided     Education provided:   - Education/Self-Care provided:   · Patient educated on the impairments noted above and the effects of physical therapy intervention to improve overall condition and QOL.   · Patient was educated on all the above exercise prior/during/after for proper posture, positioning, and execution for safe performance with home exercise program.   · Patient educated on the importance of strong core and lower extremity musculature in order to improve both static and dynamic balance, improve gait mechanics, and improve household and community mobility    Written Home Exercises Provided: Patient instructed to cont prior HEP. Exercises were reviewed and Shama was able to demonstrate them prior " to the end of the session.  Shama demonstrated good  understanding of the education provided. See EMR under Patient Instructions for exercises provided during therapy sessions    ASSESSMENT     Patient presents with no calf pain but mild soreness and back pain. Minimal changes were made secondary to patients back pain and soreness. Agility drills continue to be challenging for patient secondary to coordination. Single leg dead lifts were performed for increased lower extremity strength and ankle stability. Patient reports he is ready to make today his last day and will follow up if needed. Plan to keep case open for a month and then discharge patient.     Shama Is progressing well towards his goals.   Pt prognosis is Good.     Pt will continue to benefit from skilled outpatient physical therapy to address the deficits listed in the problem list box on initial evaluation, provide pt/family education and to maximize pt's level of independence in the home and community environment.     Pt's spiritual, cultural and educational needs considered and pt agreeable to plan of care and goals.     Anticipated barriers to physical therapy: pain     Goals:   Short Term Goals:  4 weeks   1. Pain: Pt will demonstrate improved pain by reports of less than or equal to 7/10 worst pain on the verbal rating scale in order to progress toward maximal functional ability and improve QOL. MET 6/16/2022   2.  Mobility: Patient will present with full right ankle inversion and eversion in order to return to maximal functional potential and improve quality of life. with in normal limits - MET 7/21/2022   3.  HEP: Patient will demonstrate independence with HEP in order to progress toward functional independence. MET 6/16/2022      Long Term Goals:  8 weeks   1. Pain: Pt will demonstrate improved pain by reports of less than or equal to 5/10 worst pain on the verbal rating scale in order to progress toward maximal functional ability and  improve QOL.  MET 6/30/2022   2. Function: Patient will demonstrate improved function as indicated by a functional limitation score of 30% on the FOTO. PC   3. Mobility: Patient will improve AROM of right ankle dorsiflexion to 10 degrees in order to return to maximal functional potential and improve quality of life. with in normal limits - MET 7/21/2022   4. Strength: Patient will improve strength to 5/5 in bilateral lower extremities in order to improve functional independence and quality of life. MET 7/5/2022   5. Patient will return to normal ADL's, IADL's, community involvement, recreational activities, and work-related activities with no pain and maximal function. PC      Goals Key:  PC= progressing/continue;        PM= partially met;             DC= discontinue      PLAN     Plan of care Certification: 6/7/2022 to 9/7/2022.     Cont PT per POC and progress pt as tolerated and appropriate.    Ira Simon, PT, DPT, PPCES  07/21/2022

## 2022-07-21 ENCOUNTER — CLINICAL SUPPORT (OUTPATIENT)
Dept: REHABILITATION | Facility: HOSPITAL | Age: 52
End: 2022-07-21
Payer: COMMERCIAL

## 2022-07-21 DIAGNOSIS — M25.671 DECREASED RANGE OF MOTION OF RIGHT ANKLE: Primary | ICD-10-CM

## 2022-07-21 PROCEDURE — 97140 MANUAL THERAPY 1/> REGIONS: CPT | Mod: PN

## 2022-07-21 PROCEDURE — 97110 THERAPEUTIC EXERCISES: CPT | Mod: PN

## 2022-08-02 ENCOUNTER — OFFICE VISIT (OUTPATIENT)
Dept: INTERNAL MEDICINE | Facility: CLINIC | Age: 52
End: 2022-08-02
Payer: COMMERCIAL

## 2022-08-02 VITALS
HEIGHT: 75 IN | HEART RATE: 61 BPM | SYSTOLIC BLOOD PRESSURE: 136 MMHG | WEIGHT: 246.69 LBS | BODY MASS INDEX: 30.67 KG/M2 | DIASTOLIC BLOOD PRESSURE: 78 MMHG | OXYGEN SATURATION: 98 % | TEMPERATURE: 98 F

## 2022-08-02 DIAGNOSIS — Z00.00 ANNUAL PHYSICAL EXAM: Primary | ICD-10-CM

## 2022-08-02 PROCEDURE — 3044F PR MOST RECENT HEMOGLOBIN A1C LEVEL <7.0%: ICD-10-PCS | Mod: CPTII,S$GLB,, | Performed by: FAMILY MEDICINE

## 2022-08-02 PROCEDURE — 3044F HG A1C LEVEL LT 7.0%: CPT | Mod: CPTII,S$GLB,, | Performed by: FAMILY MEDICINE

## 2022-08-02 PROCEDURE — 99999 PR PBB SHADOW E&M-EST. PATIENT-LVL III: ICD-10-PCS | Mod: PBBFAC,,, | Performed by: FAMILY MEDICINE

## 2022-08-02 PROCEDURE — 1160F PR REVIEW ALL MEDS BY PRESCRIBER/CLIN PHARMACIST DOCUMENTED: ICD-10-PCS | Mod: CPTII,S$GLB,, | Performed by: FAMILY MEDICINE

## 2022-08-02 PROCEDURE — 3075F SYST BP GE 130 - 139MM HG: CPT | Mod: CPTII,S$GLB,, | Performed by: FAMILY MEDICINE

## 2022-08-02 PROCEDURE — 1159F MED LIST DOCD IN RCRD: CPT | Mod: CPTII,S$GLB,, | Performed by: FAMILY MEDICINE

## 2022-08-02 PROCEDURE — 1160F RVW MEDS BY RX/DR IN RCRD: CPT | Mod: CPTII,S$GLB,, | Performed by: FAMILY MEDICINE

## 2022-08-02 PROCEDURE — 3008F BODY MASS INDEX DOCD: CPT | Mod: CPTII,S$GLB,, | Performed by: FAMILY MEDICINE

## 2022-08-02 PROCEDURE — 99396 PREV VISIT EST AGE 40-64: CPT | Mod: S$GLB,,, | Performed by: FAMILY MEDICINE

## 2022-08-02 PROCEDURE — 3008F PR BODY MASS INDEX (BMI) DOCUMENTED: ICD-10-PCS | Mod: CPTII,S$GLB,, | Performed by: FAMILY MEDICINE

## 2022-08-02 PROCEDURE — 3075F PR MOST RECENT SYSTOLIC BLOOD PRESS GE 130-139MM HG: ICD-10-PCS | Mod: CPTII,S$GLB,, | Performed by: FAMILY MEDICINE

## 2022-08-02 PROCEDURE — 3078F DIAST BP <80 MM HG: CPT | Mod: CPTII,S$GLB,, | Performed by: FAMILY MEDICINE

## 2022-08-02 PROCEDURE — 99999 PR PBB SHADOW E&M-EST. PATIENT-LVL III: CPT | Mod: PBBFAC,,, | Performed by: FAMILY MEDICINE

## 2022-08-02 PROCEDURE — 99396 PR PREVENTIVE VISIT,EST,40-64: ICD-10-PCS | Mod: S$GLB,,, | Performed by: FAMILY MEDICINE

## 2022-08-02 PROCEDURE — 1159F PR MEDICATION LIST DOCUMENTED IN MEDICAL RECORD: ICD-10-PCS | Mod: CPTII,S$GLB,, | Performed by: FAMILY MEDICINE

## 2022-08-02 PROCEDURE — 3078F PR MOST RECENT DIASTOLIC BLOOD PRESSURE < 80 MM HG: ICD-10-PCS | Mod: CPTII,S$GLB,, | Performed by: FAMILY MEDICINE

## 2022-08-02 RX ORDER — AMLODIPINE BESYLATE 10 MG/1
10 TABLET ORAL DAILY
Qty: 90 TABLET | Refills: 3 | Status: SHIPPED | OUTPATIENT
Start: 2022-08-02 | End: 2023-08-17

## 2022-08-02 NOTE — PROGRESS NOTES
"Subjective:      Patient ID: Jimi Clark is a 51 y.o. male.    Chief Complaint: Annual Exam    HPI 51 y.o. male  patient with a PMHx of back pain, HTN, and seasonal allergies presents to clinic for annual exam. The patient was last seen on April 5, 2022      Today he reports he is doing well. Patient reports he have been taking his amlodipine. Patient otherwise without complaints. Denies SOB, chest pain, and bowel changes.    Planning to start working out.    Past Medical History:   Diagnosis Date    Back pain     Dr. Arun Ferreira    HTN (hypertension)     Seasonal allergies      Family History   Problem Relation Age of Onset    Alcohol abuse Mother     Stroke Mother     Heart disease Mother     Heart disease Father     Stroke Father     Heart disease Maternal Aunt     Heart attack Maternal Aunt 59    Heart disease Maternal Grandmother     Cancer Maternal Grandfather         prostate     Past Surgical History:   Procedure Laterality Date    achillies tendon tear      COLONOSCOPY N/A 8/7/2018    Procedure: COLONOSCOPY;  Surgeon: Wilber Chavez MD;  Location: [facility];  Service: Endoscopy;  Laterality: N/A;    COLONOSCOPY N/A 7/6/2022    Procedure: COLONOSCOPY;  Surgeon: Demi Guzman DO;  Location: [facility];  Service: General;  Laterality: N/A;    KNEE ARTHROSCOPY Left 2017    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   HENT: Negative for hearing loss and rhinorrhea.    Eyes: Negative for discharge and " visual disturbance.   Respiratory: Negative for cough, chest tightness, shortness of breath and wheezing.    Cardiovascular: Negative for chest pain, palpitations and leg swelling.   Gastrointestinal: Negative for abdominal distention, abdominal pain, blood in stool, constipation, diarrhea and vomiting.   Endocrine: Negative for polydipsia and polyuria.   Genitourinary: Negative for difficulty urinating, dysuria, frequency, hematuria and urgency.   Musculoskeletal: Negative for arthralgias, back pain, joint swelling and neck pain.   Skin: Negative for color change and rash.   Neurological: Negative for weakness and headaches.   Hematological: Negative for adenopathy.   Psychiatric/Behavioral: Negative for confusion, decreased concentration and dysphoric mood.       Objective:     Physical Exam  Vitals and nursing note reviewed.   Constitutional:       General: He is not in acute distress.  HENT:      Right Ear: External ear normal.      Left Ear: External ear normal.      Nose: Nose normal.   Eyes:      Conjunctiva/sclera: Conjunctivae normal.      Pupils: Pupils are equal, round, and reactive to light.   Neck:      Vascular: No carotid bruit.   Cardiovascular:      Rate and Rhythm: Normal rate and regular rhythm.      Heart sounds: Normal heart sounds.   Pulmonary:      Effort: Pulmonary effort is normal. No respiratory distress.      Breath sounds: Normal breath sounds. No wheezing or rales.   Abdominal:      General: Bowel sounds are normal. There is no distension.      Palpations: Abdomen is soft.      Tenderness: There is no abdominal tenderness. There is no guarding.   Musculoskeletal:      Cervical back: Normal range of motion and neck supple.      Right lower leg: No edema.      Left lower leg: No edema.   Skin:     General: Skin is warm and dry.      Findings: No rash.   Neurological:      Mental Status: He is alert and oriented to person, place, and time.   Psychiatric:         Behavior: Behavior normal.          Thought Content: Thought content normal.         Judgment: Judgment normal.         Lab Results   Component Value Date    WBC 5.91 06/18/2021    HGB 14.0 06/18/2021    HCT 41.8 06/18/2021     06/18/2021    CHOL 157 06/03/2022    TRIG 53 06/03/2022    HDL 52 06/03/2022    ALT 16 06/03/2022    AST 17 06/03/2022     06/03/2022    K 4.1 06/03/2022     06/03/2022    CREATININE 1.3 06/03/2022    BUN 10 06/03/2022    CO2 28 06/03/2022    TSH 1.169 06/18/2021    PSA 3.0 06/03/2022    HGBA1C 5.1 06/03/2022       Assessment:     1. Annual physical exam         Plan:     Annual physical exam    Other orders  -     amLODIPine (NORVASC) 10 MG tablet; Take 1 tablet (10 mg total) by mouth once daily.  Dispense: 90 tablet; Refill: 3        Vitals reviewed. BP within normal range at 138/78.    Labs reviewed and discussed.  Patient overall doing well.  Questions and concerns addressed.          Documentation entered by Antonia Melendez, acting as scribe for Dr. Mack Bauman. 08/02/2022 1:03 PM.

## 2022-08-10 ENCOUNTER — DOCUMENTATION ONLY (OUTPATIENT)
Dept: REHABILITATION | Facility: HOSPITAL | Age: 52
End: 2022-08-10
Payer: COMMERCIAL

## 2022-08-26 ENCOUNTER — TELEPHONE (OUTPATIENT)
Dept: INTERNAL MEDICINE | Facility: CLINIC | Age: 52
End: 2022-08-26
Payer: COMMERCIAL

## 2022-08-26 NOTE — TELEPHONE ENCOUNTER
Left message on voice mail informing pt that form is ready to be picked up. He will need to sign form before being uploaded to insurance company.

## 2022-12-12 ENCOUNTER — OFFICE VISIT (OUTPATIENT)
Dept: INTERNAL MEDICINE | Facility: CLINIC | Age: 52
End: 2022-12-12
Payer: COMMERCIAL

## 2022-12-12 ENCOUNTER — TELEPHONE (OUTPATIENT)
Dept: INTERNAL MEDICINE | Facility: CLINIC | Age: 52
End: 2022-12-12

## 2022-12-12 ENCOUNTER — PATIENT MESSAGE (OUTPATIENT)
Dept: INTERNAL MEDICINE | Facility: CLINIC | Age: 52
End: 2022-12-12

## 2022-12-12 VITALS
BODY MASS INDEX: 31.58 KG/M2 | TEMPERATURE: 97 F | SYSTOLIC BLOOD PRESSURE: 138 MMHG | WEIGHT: 254 LBS | HEIGHT: 75 IN | DIASTOLIC BLOOD PRESSURE: 88 MMHG | OXYGEN SATURATION: 98 % | HEART RATE: 75 BPM

## 2022-12-12 DIAGNOSIS — F43.23 ADJUSTMENT DISORDER WITH MIXED ANXIETY AND DEPRESSED MOOD: Primary | ICD-10-CM

## 2022-12-12 DIAGNOSIS — I10 ESSENTIAL HYPERTENSION: ICD-10-CM

## 2022-12-12 PROCEDURE — 1160F PR REVIEW ALL MEDS BY PRESCRIBER/CLIN PHARMACIST DOCUMENTED: ICD-10-PCS | Mod: CPTII,S$GLB,, | Performed by: NURSE PRACTITIONER

## 2022-12-12 PROCEDURE — 1159F PR MEDICATION LIST DOCUMENTED IN MEDICAL RECORD: ICD-10-PCS | Mod: CPTII,S$GLB,, | Performed by: NURSE PRACTITIONER

## 2022-12-12 PROCEDURE — 99999 PR PBB SHADOW E&M-EST. PATIENT-LVL V: ICD-10-PCS | Mod: PBBFAC,,, | Performed by: NURSE PRACTITIONER

## 2022-12-12 PROCEDURE — 3079F DIAST BP 80-89 MM HG: CPT | Mod: CPTII,S$GLB,, | Performed by: NURSE PRACTITIONER

## 2022-12-12 PROCEDURE — 99999 PR PBB SHADOW E&M-EST. PATIENT-LVL V: CPT | Mod: PBBFAC,,, | Performed by: NURSE PRACTITIONER

## 2022-12-12 PROCEDURE — 3008F PR BODY MASS INDEX (BMI) DOCUMENTED: ICD-10-PCS | Mod: CPTII,S$GLB,, | Performed by: NURSE PRACTITIONER

## 2022-12-12 PROCEDURE — 99214 PR OFFICE/OUTPT VISIT, EST, LEVL IV, 30-39 MIN: ICD-10-PCS | Mod: S$GLB,,, | Performed by: NURSE PRACTITIONER

## 2022-12-12 PROCEDURE — 3075F SYST BP GE 130 - 139MM HG: CPT | Mod: CPTII,S$GLB,, | Performed by: NURSE PRACTITIONER

## 2022-12-12 PROCEDURE — 3044F PR MOST RECENT HEMOGLOBIN A1C LEVEL <7.0%: ICD-10-PCS | Mod: CPTII,S$GLB,, | Performed by: NURSE PRACTITIONER

## 2022-12-12 PROCEDURE — 3079F PR MOST RECENT DIASTOLIC BLOOD PRESSURE 80-89 MM HG: ICD-10-PCS | Mod: CPTII,S$GLB,, | Performed by: NURSE PRACTITIONER

## 2022-12-12 PROCEDURE — 1159F MED LIST DOCD IN RCRD: CPT | Mod: CPTII,S$GLB,, | Performed by: NURSE PRACTITIONER

## 2022-12-12 PROCEDURE — 3044F HG A1C LEVEL LT 7.0%: CPT | Mod: CPTII,S$GLB,, | Performed by: NURSE PRACTITIONER

## 2022-12-12 PROCEDURE — 3075F PR MOST RECENT SYSTOLIC BLOOD PRESS GE 130-139MM HG: ICD-10-PCS | Mod: CPTII,S$GLB,, | Performed by: NURSE PRACTITIONER

## 2022-12-12 PROCEDURE — 3008F BODY MASS INDEX DOCD: CPT | Mod: CPTII,S$GLB,, | Performed by: NURSE PRACTITIONER

## 2022-12-12 PROCEDURE — 99214 OFFICE O/P EST MOD 30 MIN: CPT | Mod: S$GLB,,, | Performed by: NURSE PRACTITIONER

## 2022-12-12 PROCEDURE — 1160F RVW MEDS BY RX/DR IN RCRD: CPT | Mod: CPTII,S$GLB,, | Performed by: NURSE PRACTITIONER

## 2022-12-12 RX ORDER — BUSPIRONE HYDROCHLORIDE 10 MG/1
10 TABLET ORAL 2 TIMES DAILY
Qty: 60 TABLET | Refills: 1 | Status: SHIPPED | OUTPATIENT
Start: 2022-12-12 | End: 2023-01-04

## 2022-12-12 NOTE — TELEPHONE ENCOUNTER
----- Message from Cleve Collier NP sent at 12/12/2022 12:33 PM CST -----  Hey,  I spoke with Dr. WEBB and she agrees pt needs to see her to discuss paperwork. Okay to be virtual. Can you please call him and let him know and get him scheduled?    Thank you  Cleve

## 2022-12-12 NOTE — PROGRESS NOTES
"Subjective:       Patient ID: Jimi Clark is a 51 y.o. male.    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   HENT: Negative.     Eyes: Negative.    Respiratory:  Negative for apnea, chest tightness, shortness of breath and stridor.    Cardiovascular:  Negative for chest pain, palpitations and leg swelling.   Gastrointestinal: Negative.    Endocrine: Negative.    Genitourinary: Negative.    Musculoskeletal:  Negative for arthralgias and myalgias.   Skin:  Negative for color change, pallor, rash and wound.   Allergic/Immunologic: Negative.    Neurological:  Negative for dizziness, facial asymmetry, light-headedness and headaches.   Hematological:  Negative for adenopathy.   Psychiatric/Behavioral:  Positive for sleep disturbance. Negative for agitation and behavioral problems.      Objective:      Physical Exam  Vitals and nursing note reviewed.   Constitutional:       General: He is not in acute distress.     Appearance: He is well-developed. He is not diaphoretic.   HENT:      " Head: Normocephalic and atraumatic.   Cardiovascular:      Rate and Rhythm: Normal rate and regular rhythm.      Heart sounds: Normal heart sounds.   Pulmonary:      Effort: Pulmonary effort is normal. No respiratory distress.      Breath sounds: Normal breath sounds.   Skin:     General: Skin is warm and dry.      Findings: No rash.   Neurological:      Mental Status: He is alert and oriented to person, place, and time.   Psychiatric:         Behavior: Behavior normal.         Thought Content: Thought content normal.         Judgment: Judgment normal.       Assessment:       1. Adjustment disorder with mixed anxiety and depressed mood    2. Essential hypertension    3. BMI 31.0-31.9,adult          Plan:       Jimi was seen today for stress.    Diagnoses and all orders for this visit:    Adjustment disorder with mixed anxiety and depressed mood  -     busPIRone (BUSPAR) 10 MG tablet; Take 1 tablet (10 mg total) by mouth 2 (two) times daily.  - Will start buspar and have close follow up with Dr. WEBB     Essential hypertension          - Chronic, elevated initially, recheck improved. Continue meds as prescribed  BMI 31.0-31.9,adult      Discussed FMLA paperwork request and need for close follow up with PCP   Offered counseling/psych referral, but deferred  Will do virtual with Dr. WEBB to discuss further in detail the nature of his stress/anxiety   Follow up for worsening or no improvement in symptoms and PRN.

## 2022-12-13 ENCOUNTER — OFFICE VISIT (OUTPATIENT)
Dept: INTERNAL MEDICINE | Facility: CLINIC | Age: 52
End: 2022-12-13
Payer: COMMERCIAL

## 2022-12-13 DIAGNOSIS — F43.0 ACUTE STRESS REACTION: Primary | ICD-10-CM

## 2022-12-13 PROCEDURE — 1159F MED LIST DOCD IN RCRD: CPT | Mod: CPTII,95,, | Performed by: FAMILY MEDICINE

## 2022-12-13 PROCEDURE — 1159F PR MEDICATION LIST DOCUMENTED IN MEDICAL RECORD: ICD-10-PCS | Mod: CPTII,95,, | Performed by: FAMILY MEDICINE

## 2022-12-13 PROCEDURE — 99213 OFFICE O/P EST LOW 20 MIN: CPT | Mod: 95,,, | Performed by: FAMILY MEDICINE

## 2022-12-13 PROCEDURE — 1160F PR REVIEW ALL MEDS BY PRESCRIBER/CLIN PHARMACIST DOCUMENTED: ICD-10-PCS | Mod: CPTII,95,, | Performed by: FAMILY MEDICINE

## 2022-12-13 PROCEDURE — 99213 PR OFFICE/OUTPT VISIT, EST, LEVL III, 20-29 MIN: ICD-10-PCS | Mod: 95,,, | Performed by: FAMILY MEDICINE

## 2022-12-13 PROCEDURE — 1160F RVW MEDS BY RX/DR IN RCRD: CPT | Mod: CPTII,95,, | Performed by: FAMILY MEDICINE

## 2022-12-13 PROCEDURE — 3044F PR MOST RECENT HEMOGLOBIN A1C LEVEL <7.0%: ICD-10-PCS | Mod: CPTII,95,, | Performed by: FAMILY MEDICINE

## 2022-12-13 PROCEDURE — 3044F HG A1C LEVEL LT 7.0%: CPT | Mod: CPTII,95,, | Performed by: FAMILY MEDICINE

## 2022-12-13 NOTE — PROGRESS NOTES
Subjective:      Patient ID: Jimi Clark is a 51 y.o. male.    Chief Complaint:  Work papers/stress    HPI  50 yo presents via telemed.   He is stressed.  Mother had to have hip replacement the end of Nov with Dr. Lopez.  She went to Rehab hospital once she had surgery.  Got discharged and then had to go back to ER.  She was dx'd with lung CA and is undergoing work up with PET scan now.  He is overwhelmed and not working.  Dr. Lopez has filled out FMLA for pt under mom for her care.  He wants me to take him out of work for stress.  He thinks he would benefit from talking to someone.    The patient location is: Home  The chief complaint leading to consultation is: Stress    Visit type: audiovisual    Face to Face time with patient: 15 mins   minutes of total time spent on the encounter, which includes face to face time and non-face to face time preparing to see the patient (eg, review of tests), Obtaining and/or reviewing separately obtained history, Documenting clinical information in the electronic or other health record, Independently interpreting results (not separately reported) and communicating results to the patient/family/caregiver, or Care coordination (not separately reported).         Each patient to whom he or she provides medical services by telemedicine is:  (1) informed of the relationship between the physician and patient and the respective role of any other health care provider with respect to management of the patient; and (2) notified that he or she may decline to receive medical services by telemedicine and may withdraw from such care at any time.    Notes:      Past Medical History:   Diagnosis Date    Back pain     Dr. Arun Ferreira    HTN (hypertension)     Seasonal allergies      Family History   Problem Relation Age of Onset    Alcohol abuse Mother     Stroke Mother     Heart disease Mother     Heart disease Father     Stroke Father     Heart disease Maternal Aunt     Heart attack  Maternal Aunt 59    Heart disease Maternal Grandmother     Cancer Maternal Grandfather         prostate     Past Surgical History:   Procedure Laterality Date    achillies tendon tear      COLONOSCOPY N/A 8/7/2018    Procedure: COLONOSCOPY;  Surgeon: Wilber Chavez MD;  Location: [facility] ENDO;  Service: Endoscopy;  Laterality: N/A;    COLONOSCOPY N/A 7/6/2022    Procedure: COLONOSCOPY;  Surgeon: Demi Guzman DO;  Location: 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    Review of Systems   Constitutional:  Negative for activity change and unexpected weight change.   HENT:  Negative for hearing loss, rhinorrhea and trouble swallowing.    Eyes:  Negative for discharge and visual disturbance.   Respiratory:  Negative for chest tightness and wheezing.    Cardiovascular:  Negative for chest pain and palpitations.   Gastrointestinal:  Negative for blood in stool, constipation, diarrhea and vomiting.   Endocrine: Negative for polydipsia and polyuria.   Genitourinary:  Negative for difficulty urinating, hematuria and urgency.   Musculoskeletal:  Negative for arthralgias, joint swelling and neck pain.   Neurological:  Positive for headaches. Negative for weakness.   Psychiatric/Behavioral:  Positive for dysphoric mood. Negative for confusion.      Objective:     Physical Exam  Constitutional:       General: He is not in acute distress.     Appearance: He is well-developed. He is not diaphoretic.   HENT:      Head: Normocephalic and atraumatic.   Eyes:      General:         Right eye: No discharge.         Left eye: No discharge.   Pulmonary:      Effort: Pulmonary effort is normal. No respiratory distress.    Neurological:      Mental Status: He is alert and oriented to person, place, and time.   Psychiatric:         Behavior: Behavior normal.         Thought Content: Thought content normal.         Judgment: Judgment normal.       Lab Results   Component Value Date    WBC 5.91 06/18/2021    HGB 14.0 06/18/2021    HCT 41.8 06/18/2021     06/18/2021    CHOL 157 06/03/2022    TRIG 53 06/03/2022    HDL 52 06/03/2022    ALT 16 06/03/2022    AST 17 06/03/2022     06/03/2022    K 4.1 06/03/2022     06/03/2022    CREATININE 1.3 06/03/2022    BUN 10 06/03/2022    CO2 28 06/03/2022    TSH 1.169 06/18/2021    PSA 3.0 06/03/2022    HGBA1C 5.1 06/03/2022       Assessment:     1. Acute stress reaction         Plan:     Acute stress reaction  -     Ambulatory referral/consult to Psychology; Future; Expected date: 12/20/2022        Pt has FMLA thru mom for her care  I advised him that I couldn't take him off work for stress.  I recommend returning to work and we will take care of further FMLA for his mother once we know what her schedule/treatment will look like.  I placed order for therapy  F/u PRN

## 2023-01-06 ENCOUNTER — TELEPHONE (OUTPATIENT)
Dept: PULMONOLOGY | Facility: CLINIC | Age: 53
End: 2023-01-06
Payer: COMMERCIAL

## 2023-01-06 ENCOUNTER — OFFICE VISIT (OUTPATIENT)
Dept: INTERNAL MEDICINE | Facility: CLINIC | Age: 53
End: 2023-01-06
Payer: COMMERCIAL

## 2023-01-06 ENCOUNTER — LAB VISIT (OUTPATIENT)
Dept: LAB | Facility: HOSPITAL | Age: 53
End: 2023-01-06
Attending: NURSE PRACTITIONER
Payer: COMMERCIAL

## 2023-01-06 VITALS
HEIGHT: 75 IN | TEMPERATURE: 98 F | HEART RATE: 70 BPM | WEIGHT: 254.63 LBS | BODY MASS INDEX: 31.66 KG/M2 | DIASTOLIC BLOOD PRESSURE: 80 MMHG | SYSTOLIC BLOOD PRESSURE: 124 MMHG | OXYGEN SATURATION: 98 %

## 2023-01-06 DIAGNOSIS — E66.1 CLASS 1 DRUG-INDUCED OBESITY WITH SERIOUS COMORBIDITY AND BODY MASS INDEX (BMI) OF 31.0 TO 31.9 IN ADULT: ICD-10-CM

## 2023-01-06 DIAGNOSIS — R40.0 DAYTIME SLEEPINESS: ICD-10-CM

## 2023-01-06 DIAGNOSIS — I10 ESSENTIAL HYPERTENSION: ICD-10-CM

## 2023-01-06 DIAGNOSIS — Z23 NEED FOR SHINGLES VACCINE: ICD-10-CM

## 2023-01-06 DIAGNOSIS — R40.0 DAYTIME SLEEPINESS: Primary | ICD-10-CM

## 2023-01-06 PROCEDURE — 99214 OFFICE O/P EST MOD 30 MIN: CPT | Mod: 25,S$GLB,, | Performed by: NURSE PRACTITIONER

## 2023-01-06 PROCEDURE — 1160F RVW MEDS BY RX/DR IN RCRD: CPT | Mod: CPTII,S$GLB,, | Performed by: NURSE PRACTITIONER

## 2023-01-06 PROCEDURE — 3008F PR BODY MASS INDEX (BMI) DOCUMENTED: ICD-10-PCS | Mod: CPTII,S$GLB,, | Performed by: NURSE PRACTITIONER

## 2023-01-06 PROCEDURE — 3079F PR MOST RECENT DIASTOLIC BLOOD PRESSURE 80-89 MM HG: ICD-10-PCS | Mod: CPTII,S$GLB,, | Performed by: NURSE PRACTITIONER

## 2023-01-06 PROCEDURE — 99999 PR PBB SHADOW E&M-EST. PATIENT-LVL V: ICD-10-PCS | Mod: PBBFAC,,, | Performed by: NURSE PRACTITIONER

## 2023-01-06 PROCEDURE — 1159F MED LIST DOCD IN RCRD: CPT | Mod: CPTII,S$GLB,, | Performed by: NURSE PRACTITIONER

## 2023-01-06 PROCEDURE — 36415 COLL VENOUS BLD VENIPUNCTURE: CPT | Mod: PO | Performed by: NURSE PRACTITIONER

## 2023-01-06 PROCEDURE — 99999 PR PBB SHADOW E&M-EST. PATIENT-LVL V: CPT | Mod: PBBFAC,,, | Performed by: NURSE PRACTITIONER

## 2023-01-06 PROCEDURE — 84443 ASSAY THYROID STIM HORMONE: CPT | Performed by: NURSE PRACTITIONER

## 2023-01-06 PROCEDURE — 3074F SYST BP LT 130 MM HG: CPT | Mod: CPTII,S$GLB,, | Performed by: NURSE PRACTITIONER

## 2023-01-06 PROCEDURE — 3074F PR MOST RECENT SYSTOLIC BLOOD PRESSURE < 130 MM HG: ICD-10-PCS | Mod: CPTII,S$GLB,, | Performed by: NURSE PRACTITIONER

## 2023-01-06 PROCEDURE — 99214 PR OFFICE/OUTPT VISIT, EST, LEVL IV, 30-39 MIN: ICD-10-PCS | Mod: 25,S$GLB,, | Performed by: NURSE PRACTITIONER

## 2023-01-06 PROCEDURE — 90472 IMMUNIZATION ADMIN EACH ADD: CPT | Mod: S$GLB,,, | Performed by: NURSE PRACTITIONER

## 2023-01-06 PROCEDURE — 90471 IMMUNIZATION ADMIN: CPT | Mod: S$GLB,,, | Performed by: NURSE PRACTITIONER

## 2023-01-06 PROCEDURE — 90472 ZOSTER RECOMBINANT VACCINE: ICD-10-PCS | Mod: S$GLB,,, | Performed by: NURSE PRACTITIONER

## 2023-01-06 PROCEDURE — 90686 IIV4 VACC NO PRSV 0.5 ML IM: CPT | Mod: S$GLB,,, | Performed by: NURSE PRACTITIONER

## 2023-01-06 PROCEDURE — 90471 FLU VACCINE (QUAD) GREATER THAN OR EQUAL TO 3YO PRESERVATIVE FREE IM: ICD-10-PCS | Mod: S$GLB,,, | Performed by: NURSE PRACTITIONER

## 2023-01-06 PROCEDURE — 90686 FLU VACCINE (QUAD) GREATER THAN OR EQUAL TO 3YO PRESERVATIVE FREE IM: ICD-10-PCS | Mod: S$GLB,,, | Performed by: NURSE PRACTITIONER

## 2023-01-06 PROCEDURE — 3079F DIAST BP 80-89 MM HG: CPT | Mod: CPTII,S$GLB,, | Performed by: NURSE PRACTITIONER

## 2023-01-06 PROCEDURE — 90750 ZOSTER RECOMBINANT VACCINE: ICD-10-PCS | Mod: S$GLB,,, | Performed by: NURSE PRACTITIONER

## 2023-01-06 PROCEDURE — 1160F PR REVIEW ALL MEDS BY PRESCRIBER/CLIN PHARMACIST DOCUMENTED: ICD-10-PCS | Mod: CPTII,S$GLB,, | Performed by: NURSE PRACTITIONER

## 2023-01-06 PROCEDURE — 1159F PR MEDICATION LIST DOCUMENTED IN MEDICAL RECORD: ICD-10-PCS | Mod: CPTII,S$GLB,, | Performed by: NURSE PRACTITIONER

## 2023-01-06 PROCEDURE — 90750 HZV VACC RECOMBINANT IM: CPT | Mod: S$GLB,,, | Performed by: NURSE PRACTITIONER

## 2023-01-06 PROCEDURE — 3008F BODY MASS INDEX DOCD: CPT | Mod: CPTII,S$GLB,, | Performed by: NURSE PRACTITIONER

## 2023-01-06 NOTE — PROGRESS NOTES
"Subjective:       Patient ID: Jimi Clark is a 52 y.o. male.    Chief Complaint: Sleep Apnea    Patient here today requesting home sleep study  Snores  Wife states he snores loud; wife feels he stops breathing or pauses when sleeping  Patient wakes up with headaches  Does not feel rested when he wakes up  He does have a hx of htn  Patient always tired, does not feel that he sleeps well      /80   Pulse 70   Temp 97.9 °F (36.6 °C) (Temporal)   Ht 6' 3" (1.905 m)   Wt 115.5 kg (254 lb 10.1 oz)   SpO2 98%   BMI 31.83 kg/m²     Review of Systems   Constitutional:  Positive for fatigue. Negative for activity change, appetite change, chills, diaphoresis, fever and unexpected weight change.   HENT: Negative.     Eyes: Negative.    Respiratory:  Negative for apnea, chest tightness, shortness of breath and stridor.    Cardiovascular:  Negative for chest pain, palpitations and leg swelling.   Gastrointestinal: Negative.    Endocrine: Negative.    Genitourinary: Negative.    Musculoskeletal:  Negative for arthralgias and myalgias.   Skin:  Negative for color change, pallor, rash and wound.   Allergic/Immunologic: Negative.    Neurological:  Negative for dizziness, facial asymmetry, light-headedness and headaches.   Hematological:  Negative for adenopathy.   Psychiatric/Behavioral:  Positive for sleep disturbance. Negative for agitation and behavioral problems.      Objective:      Physical Exam  Vitals and nursing note reviewed.   Constitutional:       General: He is not in acute distress.     Appearance: He is well-developed. He is not diaphoretic.   HENT:      Head: Normocephalic and atraumatic.   Neck:      Comments: Neck circ 18.75 inches  Cardiovascular:      Rate and Rhythm: Normal rate and regular rhythm.      Heart sounds: Normal heart sounds.   Pulmonary:      Effort: Pulmonary effort is normal. No respiratory distress.      Breath sounds: Normal breath sounds.   Skin:     General: Skin is warm and " dry.      Findings: No rash.   Neurological:      Mental Status: He is alert and oriented to person, place, and time.   Psychiatric:         Behavior: Behavior normal.         Thought Content: Thought content normal.         Judgment: Judgment normal.       EPWORTH SLEEPINESS SCALE 1/6/2023   Sitting and reading 2   Watching TV 2   Sitting, inactive in a public place (e.g. a theatre or a meeting) 1   As a passenger in a car for an hour without a break 0   Lying down to rest in the afternoon when circumstances permit 3   Sitting and talking to someone 0   Sitting quietly after a lunch without alcohol 2   In a car, while stopped for a few minutes in traffic 2   Total score 12       Assessment:       1. Daytime sleepiness    2. Essential hypertension    3. Class 1 drug-induced obesity with serious comorbidity and body mass index (BMI) of 31.0 to 31.9 in adult    4. Need for shingles vaccine          Plan:       Jimi was seen today for sleep apnea.    Diagnoses and all orders for this visit:    Daytime sleepiness  -     TSH; Future  -     Home Sleep Study; Future  Will proceed with home sleep study    Essential hypertension  -     TSH; Future  -     Home Sleep Study; Future  Stable on amlodipine    Class 1 drug-induced obesity with serious comorbidity and body mass index (BMI) of 31.0 to 31.9 in adult  Stable. Discussed exercise, lifestyle changes for weight reduction and stress mgt    Need for shingles vaccine  -     Zoster Recombinant Vaccine; Future  -     Zoster Recombinant Vaccine

## 2023-01-07 LAB — TSH SERPL DL<=0.005 MIU/L-ACNC: 0.74 UIU/ML (ref 0.4–4)

## 2023-01-17 ENCOUNTER — HOSPITAL ENCOUNTER (OUTPATIENT)
Dept: SLEEP MEDICINE | Facility: HOSPITAL | Age: 53
Discharge: HOME | End: 2023-01-17
Attending: NURSE PRACTITIONER
Payer: COMMERCIAL

## 2023-01-17 DIAGNOSIS — R40.0 DAYTIME SLEEPINESS: ICD-10-CM

## 2023-01-17 DIAGNOSIS — I10 ESSENTIAL HYPERTENSION: ICD-10-CM

## 2023-01-17 DIAGNOSIS — G47.33 OSA (OBSTRUCTIVE SLEEP APNEA): Primary | ICD-10-CM

## 2023-01-17 PROCEDURE — 95806 SLEEP STUDY UNATT&RESP EFFT: CPT | Performed by: INTERNAL MEDICINE

## 2023-01-17 PROCEDURE — 95806 SLEEP STUDY UNATT&RESP EFFT: CPT | Mod: 26,,, | Performed by: INTERNAL MEDICINE

## 2023-01-17 PROCEDURE — 95806 PR SLEEP STUDY, UNATTENDED, SIMUL RECORD HR/O2 SAT/RESP FLOW/RESP EFFT: ICD-10-PCS | Mod: 26,,, | Performed by: INTERNAL MEDICINE

## 2023-01-17 NOTE — Clinical Note
1 night study MILD/BORDERLINE OBSTRUCTIVE SLEEP APNEA with overall AHI 8.8/hr ( 64 events): night #1 Oxygen desaturation: 84%. SpO2 between 90% to 94% for 53 min. Patient snored 100 % time above 50 . Heart rate range: 53 bpm - 90 bpm REC's: Therapy with APAP at 4-20 cm WP using mask of choice with heated humidification is an option. Please refer to sleep disorders clinic for management Weight loss/management. with regular exercise per direction of physician. Avoid drowsy driving. Follow up in sleep clinic to maximize adherence and ensure resolution of symptoms.

## 2023-01-18 ENCOUNTER — PATIENT MESSAGE (OUTPATIENT)
Dept: INTERNAL MEDICINE | Facility: CLINIC | Age: 53
End: 2023-01-18
Payer: COMMERCIAL

## 2023-01-18 DIAGNOSIS — G47.33 OSA (OBSTRUCTIVE SLEEP APNEA): Primary | ICD-10-CM

## 2023-01-18 PROBLEM — R40.0 DAYTIME SLEEPINESS: Status: ACTIVE | Noted: 2023-01-18

## 2023-03-02 ENCOUNTER — OFFICE VISIT (OUTPATIENT)
Dept: PULMONOLOGY | Facility: CLINIC | Age: 53
End: 2023-03-02
Payer: COMMERCIAL

## 2023-03-02 VITALS
HEART RATE: 71 BPM | DIASTOLIC BLOOD PRESSURE: 80 MMHG | RESPIRATION RATE: 16 BRPM | OXYGEN SATURATION: 98 % | SYSTOLIC BLOOD PRESSURE: 122 MMHG | WEIGHT: 249 LBS | HEIGHT: 75 IN | BODY MASS INDEX: 30.96 KG/M2

## 2023-03-02 DIAGNOSIS — G47.33 OSA (OBSTRUCTIVE SLEEP APNEA): ICD-10-CM

## 2023-03-02 DIAGNOSIS — G47.26 SHIFTING SLEEP-WORK SCHEDULE: Primary | ICD-10-CM

## 2023-03-02 DIAGNOSIS — G47.00 INSOMNIA, UNSPECIFIED TYPE: ICD-10-CM

## 2023-03-02 PROCEDURE — 99214 OFFICE O/P EST MOD 30 MIN: CPT | Mod: S$GLB,,, | Performed by: NURSE PRACTITIONER

## 2023-03-02 PROCEDURE — 99214 PR OFFICE/OUTPT VISIT, EST, LEVL IV, 30-39 MIN: ICD-10-PCS | Mod: S$GLB,,, | Performed by: NURSE PRACTITIONER

## 2023-03-02 PROCEDURE — 1160F PR REVIEW ALL MEDS BY PRESCRIBER/CLIN PHARMACIST DOCUMENTED: ICD-10-PCS | Mod: CPTII,S$GLB,, | Performed by: NURSE PRACTITIONER

## 2023-03-02 PROCEDURE — 3074F SYST BP LT 130 MM HG: CPT | Mod: CPTII,S$GLB,, | Performed by: NURSE PRACTITIONER

## 2023-03-02 PROCEDURE — 99999 PR PBB SHADOW E&M-EST. PATIENT-LVL V: CPT | Mod: PBBFAC,,, | Performed by: NURSE PRACTITIONER

## 2023-03-02 PROCEDURE — 99999 PR PBB SHADOW E&M-EST. PATIENT-LVL V: ICD-10-PCS | Mod: PBBFAC,,, | Performed by: NURSE PRACTITIONER

## 2023-03-02 PROCEDURE — 1159F MED LIST DOCD IN RCRD: CPT | Mod: CPTII,S$GLB,, | Performed by: NURSE PRACTITIONER

## 2023-03-02 PROCEDURE — 3079F PR MOST RECENT DIASTOLIC BLOOD PRESSURE 80-89 MM HG: ICD-10-PCS | Mod: CPTII,S$GLB,, | Performed by: NURSE PRACTITIONER

## 2023-03-02 PROCEDURE — 3074F PR MOST RECENT SYSTOLIC BLOOD PRESSURE < 130 MM HG: ICD-10-PCS | Mod: CPTII,S$GLB,, | Performed by: NURSE PRACTITIONER

## 2023-03-02 PROCEDURE — 1160F RVW MEDS BY RX/DR IN RCRD: CPT | Mod: CPTII,S$GLB,, | Performed by: NURSE PRACTITIONER

## 2023-03-02 PROCEDURE — 3079F DIAST BP 80-89 MM HG: CPT | Mod: CPTII,S$GLB,, | Performed by: NURSE PRACTITIONER

## 2023-03-02 PROCEDURE — 1159F PR MEDICATION LIST DOCUMENTED IN MEDICAL RECORD: ICD-10-PCS | Mod: CPTII,S$GLB,, | Performed by: NURSE PRACTITIONER

## 2023-03-02 PROCEDURE — 3008F BODY MASS INDEX DOCD: CPT | Mod: CPTII,S$GLB,, | Performed by: NURSE PRACTITIONER

## 2023-03-02 PROCEDURE — 3008F PR BODY MASS INDEX (BMI) DOCUMENTED: ICD-10-PCS | Mod: CPTII,S$GLB,, | Performed by: NURSE PRACTITIONER

## 2023-03-02 NOTE — PROGRESS NOTES
Subjective:      Patient ID: Jimi Clark is a 52 y.o. male.    Chief Complaint: Sleep Apnea    HPI    Patient presents to the office today for evaluation of sleep apnea.  Patient with snoring and witnessed apneas. Patient not having problems falling asleep, but wakes up frequently throughout the night.  Patient does not wake up feeling refreshed. He works shift work. Sleep ranges from 4 hour- 9 hours.  Patient with daytime hypersomnolence.  [place] Sleepiness Scale score 4.  Patient has had symptoms for years, but worse with weight gain. Comorbidities include HTN, BMI 31  He had a sleep study    STOP - BANG Questionnaire:     1. Snoring : Do you snore loudly ?    Yes    2. Tired : Do you often feel tired, fatigued, or sleepy during daytime?   Yes    3. Observed: Has anyone observed you stop breathing during your sleep?   Yes    4. Blood pressure : Do you have or are you being treated for high blood pressure?   Yes    5. BMI :BMI more than 35 kg/m2?   No    6. Age : Age over 50 yr old?   Yes    7. Neck circumference: Neck circumference greater than 40 cm?   Yes    8. Gender: Gender male?   Yes    High risk of LEIGHANN: Yes 5 - 8  Intermediate risk of LEIGHANN: Yes 3 - 4  Low risk of LEIGHANN: Yes 0 - 2      References:   STOP Questionnaire   A Tool to Screen Patients for Obstructive Sleep Apnea: ALEKSANDAR De La Cruz.C.P.C., JOHANNA Lima.B.B.S., Karli Mendosa M.D.,Maria De Jesus Bennett, Ph.D., Jamari Pathak M.B.B.S.,_ Hardeep Schneider.,_ Alisha Viramontes M.D., Warren Rider F.R.C.P.C.; Anesthesiology 2008; 108:812-21 Copyright © 2008, the American Society of Anesthesiologists, Inc. [employer]    Patient Active Problem List   Diagnosis    Allergic rhinitis    Essential hypertension    EKG abnormalities    Family history of vascular disease    Back pain    Lower abdominal pain    Change in bowel function    RUQ abdominal pain    Colon polyps    Decreased ROM of ankle    Daytime  "sleepiness         /80   Pulse 71   Resp 16   Ht 6' 3" (1.905 m)   Wt 112.9 kg (249 lb)   SpO2 98%   BMI 31.12 kg/m²   Body mass index is 31.12 kg/m².    Review of Systems   Respiratory:  Positive for snoring and somnolence.    Psychiatric/Behavioral:  Positive for sleep disturbance.    All other systems reviewed and are negative.      Objective:      Physical Exam  Constitutional:       Appearance: He is well-developed. He is obese.   HENT:      Head: Normocephalic and atraumatic.      Nose: Nose normal.      Mouth/Throat:      Pharynx: Oropharynx is clear.      Comments: Mallampati Score: IV    Neck:      Thyroid: No thyroid mass or thyromegaly.      Trachea: Trachea normal.      Comments: Neck circumference:17 inches     Cardiovascular:      Rate and Rhythm: Normal rate and regular rhythm.      Heart sounds: Normal heart sounds.   Pulmonary:      Effort: Pulmonary effort is normal.      Breath sounds: Normal breath sounds. No wheezing, rhonchi or rales.   Abdominal:      Palpations: Abdomen is soft. There is no splenomegaly or mass.      Tenderness: There is no abdominal tenderness.   Musculoskeletal:         General: Normal range of motion.      Cervical back: Normal range of motion and neck supple.   Skin:     General: Skin is warm and dry.   Neurological:      Mental Status: He is alert and oriented to person, place, and time.   Psychiatric:         Mood and Affect: Mood normal.         Behavior: Behavior normal.     Personal Diagnostic Review      Assessment:     1. Shifting sleep-work schedule    2. LEIGHANN (obstructive sleep apnea)    3. Insomnia, unspecified type       Outpatient Encounter Medications as of 3/2/2023   Medication Sig Dispense Refill    amLODIPine (NORVASC) 10 MG tablet Take 1 tablet (10 mg total) by mouth once daily. 90 tablet 3    azelastine-fluticasone 137-50 mcg/spray Emery 1 spray by Nasal route.      betamethasone valerate (LUXIQ) 0.12 % foam 2 (two) times daily.  2    busPIRone " (BUSPAR) 10 MG tablet TAKE 1 TABLET BY MOUTH TWICE A  tablet 1    CELACYN GlwP APPLY 1 GRAM ON THE SKIN BID AA  3    celecoxib (CELEBREX) 200 MG capsule Take 200 mg by mouth 2 (two) times daily.      cyclobenzaprine (FLEXERIL) 10 MG tablet Take 10 mg by mouth daily as needed.      desoximetasone 0.05 % Oint ELODIA TO CHEST BID  1    GRALISE 600 mg Tb24 SMARTSI Tablet(s) By Mouth Every Evening      ketoconazole (NIZORAL) 2 % shampoo       neomycin-polymyxin-dexamethasone (DEXACINE) 3.5 mg/g-10,000 unit/g-0.1 % Oint SMARTSI.125 Inch(es) In Eye(s) Twice Daily      polyethylene glycol (GLYCOLAX) 17 gram PwPk 1 pack every other day for a couple of weeks to regulate bowels 10 each 0    TEXACORT 2.5 % Soln APPLY ON THE SKIN QOD WITH QTIP  2     No facility-administered encounter medications on file as of 3/2/2023.     Orders Placed This Encounter   Procedures    CPAP FOR HOME USE     Order Specific Question:   Length of need (1-99 months):     Answer:   99     Order Specific Question:   Type ():     Answer:   Auto CPAP     Order Specific Question:   Auto CPAP pressure setting range (cmH20):     Answer:   5-20     Order Specific Question:   Fulfillment Priority:     Answer:   Level 4:  all others     Order Specific Question:   Humidification ():     Answer:   Heated     Order Specific Question:   Choose ONE mask type and its corresponding cushions and/or pillows:     Answer:    Full Face Mask, 1 per 90 days:  Full Face Cushion, (3 per 90 days)     Order Specific Question:   Choose EITHER Heated or Non-Heated Tubjing     Answer:    Non-Heated Tubing, 1 per 90 days     Order Specific Question:   All other supplies as needed as listed below:     Answer:    Headgear, 1 per 180 days     Order Specific Question:   All other supplies as needed as listed below:     Answer:    Disposable Filter, 6 per 90 days     Order Specific Question:   All other supplies as needed as listed below:      Answer:    Non-Disposable Filter, 1 per 180 days     Order Specific Question:   All other supplies as needed as listed below:     Answer:    Humidifier Chamber, 1 per 180 days     Order Specific Question:   All other supplies as needed as listed below:     Answer:    Chin Strap, 1 per 180 days     Plan:   AutoPAP and follow up in 10 weeks with download of data card and review of symptoms.  Weight loss and exercise to improve overall health.  Try to schedule more sleep time   Problem List Items Addressed This Visit    None  Visit Diagnoses       Shifting sleep-work schedule    -  Primary    Relevant Orders    CPAP FOR HOME USE    LEIGHANN (obstructive sleep apnea)        Relevant Orders    CPAP FOR HOME USE    Insomnia, unspecified type               Thank you Blanca Ibarra NP for this consultation.

## 2023-03-02 NOTE — PATIENT INSTRUCTIONS
An order has been placed for AutoPAP machine and has been sent to a medical equipment company. The medical equipment company will send all the needed information to your insurance provider for approval. Shortly, you will be receiving a phone call about scheduling you for AutoPAP set up. If you do not hear from the company within 3 weeks, please make us aware by calling us or by sending a message through the patient portal online. You can also call them directly at   [facility]   Toll free 24 hr line for assistance: [phone]   Option 1: CPAP  (press 1 - supplies ([employer]), press 2 questions regarding your machine)  Option 2: Oxygen, Nebulizer, Ventilator  Option 3: service  Option 4: Discharge (, providers, nurses)  Option 5: Diabetics  Option 6: Ortho (ortho braces, walker, wheelchairs, etc)  Option 7: Billing    You will also need to follow back up in the clinic with your provider in 10 weeks to review compliance of your AutoPAP. Your insurance requires documented compliance (wearing over 4hrs a night). Please bring the AutoPAP with you to the follow up visit.

## 2023-03-06 ENCOUNTER — CLINICAL SUPPORT (OUTPATIENT)
Dept: INTERNAL MEDICINE | Facility: CLINIC | Age: 53
End: 2023-03-06
Payer: COMMERCIAL

## 2023-03-06 DIAGNOSIS — Z23 NEED FOR SHINGLES VACCINE: ICD-10-CM

## 2023-03-06 PROCEDURE — 90471 ZOSTER RECOMBINANT VACCINE: ICD-10-PCS | Mod: S$GLB,,, | Performed by: NURSE PRACTITIONER

## 2023-03-06 PROCEDURE — 90471 IMMUNIZATION ADMIN: CPT | Mod: S$GLB,,, | Performed by: NURSE PRACTITIONER

## 2023-03-06 PROCEDURE — 90750 HZV VACC RECOMBINANT IM: CPT | Mod: S$GLB,,, | Performed by: NURSE PRACTITIONER

## 2023-03-06 PROCEDURE — 90750 ZOSTER RECOMBINANT VACCINE: ICD-10-PCS | Mod: S$GLB,,, | Performed by: NURSE PRACTITIONER

## 2023-03-06 PROCEDURE — 99999 PR PBB SHADOW E&M-EST. PATIENT-LVL II: ICD-10-PCS | Mod: PBBFAC,,,

## 2023-03-06 PROCEDURE — 99999 PR PBB SHADOW E&M-EST. PATIENT-LVL II: CPT | Mod: PBBFAC,,,

## 2023-03-06 NOTE — PROGRESS NOTES
Shingrix #2 administered. Pt tolerated well. Advised pt to remain in lobby 15 minutes after injection. If no adverse/allergic reaction, may leave

## 2023-04-11 ENCOUNTER — OFFICE VISIT (OUTPATIENT)
Dept: OTOLARYNGOLOGY | Facility: CLINIC | Age: 53
End: 2023-04-11
Payer: COMMERCIAL

## 2023-04-11 VITALS — BODY MASS INDEX: 31.85 KG/M2 | TEMPERATURE: 98 F | WEIGHT: 254.88 LBS

## 2023-04-11 DIAGNOSIS — H65.92 OME (OTITIS MEDIA WITH EFFUSION), LEFT: ICD-10-CM

## 2023-04-11 DIAGNOSIS — H69.93 ETD (EUSTACHIAN TUBE DYSFUNCTION), BILATERAL: Primary | ICD-10-CM

## 2023-04-11 PROCEDURE — 1159F MED LIST DOCD IN RCRD: CPT | Mod: CPTII,S$GLB,, | Performed by: STUDENT IN AN ORGANIZED HEALTH CARE EDUCATION/TRAINING PROGRAM

## 2023-04-11 PROCEDURE — 3008F BODY MASS INDEX DOCD: CPT | Mod: CPTII,S$GLB,, | Performed by: STUDENT IN AN ORGANIZED HEALTH CARE EDUCATION/TRAINING PROGRAM

## 2023-04-11 PROCEDURE — 3008F PR BODY MASS INDEX (BMI) DOCUMENTED: ICD-10-PCS | Mod: CPTII,S$GLB,, | Performed by: STUDENT IN AN ORGANIZED HEALTH CARE EDUCATION/TRAINING PROGRAM

## 2023-04-11 PROCEDURE — 99204 PR OFFICE/OUTPT VISIT, NEW, LEVL IV, 45-59 MIN: ICD-10-PCS | Mod: S$GLB,,, | Performed by: STUDENT IN AN ORGANIZED HEALTH CARE EDUCATION/TRAINING PROGRAM

## 2023-04-11 PROCEDURE — 99999 PR PBB SHADOW E&M-EST. PATIENT-LVL III: CPT | Mod: PBBFAC,,, | Performed by: STUDENT IN AN ORGANIZED HEALTH CARE EDUCATION/TRAINING PROGRAM

## 2023-04-11 PROCEDURE — 1159F PR MEDICATION LIST DOCUMENTED IN MEDICAL RECORD: ICD-10-PCS | Mod: CPTII,S$GLB,, | Performed by: STUDENT IN AN ORGANIZED HEALTH CARE EDUCATION/TRAINING PROGRAM

## 2023-04-11 PROCEDURE — 99204 OFFICE O/P NEW MOD 45 MIN: CPT | Mod: S$GLB,,, | Performed by: STUDENT IN AN ORGANIZED HEALTH CARE EDUCATION/TRAINING PROGRAM

## 2023-04-11 PROCEDURE — 99999 PR PBB SHADOW E&M-EST. PATIENT-LVL III: ICD-10-PCS | Mod: PBBFAC,,, | Performed by: STUDENT IN AN ORGANIZED HEALTH CARE EDUCATION/TRAINING PROGRAM

## 2023-04-11 RX ORDER — PREDNISONE 10 MG/1
10 TABLET ORAL DAILY
Qty: 18 TABLET | Refills: 0 | Status: SHIPPED | OUTPATIENT
Start: 2023-04-11

## 2023-04-11 RX ORDER — FLUTICASONE PROPIONATE 50 MCG
1 SPRAY, SUSPENSION (ML) NASAL DAILY
Qty: 16 G | Refills: 0 | Status: SHIPPED | OUTPATIENT
Start: 2023-04-11 | End: 2023-05-09 | Stop reason: ALTCHOICE

## 2023-04-11 NOTE — PATIENT INSTRUCTIONS
We had a long discussion regarding the anatomy and function of the eustachian tube. We discussed that the eustachian tube acts as a pump to keep the appropriate amount of pressure behind the ear drum.  I gave the patient a prescription for a nasal steroid spray (Flonase) to be used on a daily basis, and we discussed that it will take 2-3 weeks of daily use to achieve maximal effectiveness.    You are also being prescribed a prednisone taper. Take 3 tablets a day for 3 days (1before breakfast, 1 after lunch, 1 before bed) Then take 2 tablets a day for 3 days (1 before breakfast, 1 before bed) Then take 1 tablet a day for 3 days (1 before breakfast).  Short-term risks were discussed include irritability, weight gain (fluid retention), feelings of jitteriness, increase heart rate (tachycardia), flushing, increased blood glucose, insomnia, stomach ulcer, glaucoma (vision changes/eye pain), and a rare risk of damage to joints necessitating joint replacement. Long-term risks were discussed include weight gain, glaucoma, cataracts, osteoporosis, hypertension, and osteonecrosis of joints. For complete list of risks, see medication insert. Regular eye exams, bone density studies, calcium supplementation and possible bisphosphonate therapy (please discuss further with your primary care physician).     You may also use Afrin (oxymetazoline) spray 2-3 times daily for the next 3-5 days. Do not use beyond 5 days, as your nose may become dependent on it.     Return clinic visit in about 4 weeks with a hearing test before. If there is no improvement in your symptoms we will consider placing an ear tube at that time.

## 2023-04-11 NOTE — PROGRESS NOTES
Chief complaint:   Chief Complaint   Patient presents with    Ear Fullness          Referring Provider:  Aaareferral Self  No address on file    History of Present Illness:     Mr. Clark is a 52 y.o. male presenting for evaluation of ear fullness, popping, plugged, muffled hearing (worse on the left). Comes and goes on the right. Onset of this chief complaint was about 2 weeks ago after a flight.  The patient has tried auto-insufflating with brief relief.  The patient denies otalgia, vertigo.  Has had similar episodes in the past with flights. This has been a chronic issue, but this is the worst episode he has had.    The patient denies significant hearing loss risk factors, ototoxic medication exposure, chronic vestibular suppressant use, head/ facial/ la trauma, and otologic surgery.        History        Past Medical History:   Past Medical History:   Diagnosis Date    Back pain     Dr. Arun Ferreira    HTN (hypertension)     Seasonal allergies     .          Past Surgical History:  Past Surgical History:   Procedure Laterality Date    achillies tendon tear      COLONOSCOPY N/A 8/7/2018    Procedure: COLONOSCOPY;  Surgeon: Wilber Chavez MD;  Location: [facility];  Service: Endoscopy;  Laterality: N/A;    COLONOSCOPY N/A 7/6/2022    Procedure: COLONOSCOPY;  Surgeon: Demi Guzman DO;  Location: 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         Medications: Medication list was reviewed. He  has a current medication list which includes the following prescription(s): amlodipine, azelastine-fluticasone, betamethasone valerate, buspirone, celacyn, celecoxib, cyclobenzaprine, gralise, ketoconazole, neomycin-polymyxin-dexamethasone, texacort, desoximetasone, fluticasone propionate, polyethylene glycol, and prednisone.         Allergies: Review of patient's allergies indicates:  No Known  Allergies         Family history: family history includes Alcohol abuse in his mother; Cancer in his maternal grandfather; Heart attack (age of onset: 59) in his maternal aunt; Heart disease in his father, maternal aunt, maternal grandmother, and mother; Stroke in his father and mother.         Social History          Alcohol use:  reports no history of alcohol use.            Tobacco:  reports that he has never smoked. He has never used smokeless tobacco.         Please see the patient's intake form for full details of past medical history, past surgical history, family history, social history and review of systems. ?This information was reviewed by me and noted.      Physical Examination     General: Well developed, well nourished, well hydrated. Verbal with a strong voice and not dysphonic.     Head/Face: Normocephalic, atraumatic. No scars or lesions. Facial musculature equal.     Eyes: No scleral icterus or conjunctival hemorrhage. EOMI. PERRLA.     Ears:     Right ear: No gross deformity. EAC is clear of debris and erythema. The TM is intact with a pneumatized middle ear. Mild retraction    Left ear: No gross deformity. EAC is clear of debris and erythema. The TM is intact with serous effusion    Hearing: grossly intact    Nose: No gross deformity or lesions. No purulent discharge. No significant NSD.      Mouth/Oropharynx: Lips without any lesions. No mucosal lesions within the oropharynx. No tonsillar exudate or lesions. Pharyngeal walls symmetrical. Uvula midline. Tongue midline without lesions.     Neck: Trachea midline. No masses. No thyromegaly or nodules palpated.     Lymphatic: No lymphadenopathy in the neck.     Extremities: No cyanosis. Warm and well-perfused.     Skin: No scars or lesions on face or neck.      Neurologic: Moving all extremities without gross abnormality.CN II-XII grossly intact. House-Brackmann 1/6. No signs of nystagmus.      Psych: Alert and oriented to person, place, and time  with an appropriate mood and affect.     Data review:    Review of records:      I reviewed records from the referring provider's office visits.  These describe the history, workup, and/or treatment of this problem thus far.         Assessment/Plan:      1. Allergic rhinitis, unspecified seasonality, unspecified trigger    2. ETD (Eustachian tube dysfunction), bilateral    3. OME (otitis media with effusion), left      We had a long discussion regarding the anatomy and function of the eustachian tube. We discussed that the eustachian tube acts as a pump to keep the appropriate amount of pressure behind the ear drum.  I gave the patient a prescription for a nasal steroid spray (Flonase) to be used on a daily basis, and we discussed that it will take 2-3 weeks of daily use to achieve maximal effectiveness.    You are also being prescribed a prednisone taper. Take 3 tablets a day for 3 days (1before breakfast, 1 after lunch, 1 before bed) Then take 2 tablets a day for 3 days (1 before breakfast, 1 before bed) Then take 1 tablet a day for 3 days (1 before breakfast).  Short-term risks were discussed include irritability, weight gain (fluid retention), feelings of jitteriness, increase heart rate (tachycardia), flushing, increased blood glucose, insomnia, stomach ulcer, glaucoma (vision changes/eye pain), and a rare risk of damage to joints necessitating joint replacement. Long-term risks were discussed include weight gain, glaucoma, cataracts, osteoporosis, hypertension, and osteonecrosis of joints. For complete list of risks, see medication insert. Regular eye exams, bone density studies, calcium supplementation and possible bisphosphonate therapy (please discuss further with your primary care physician).     You may also use Afrin (oxymetazoline) spray 2-3 times daily for the next 3-5 days. Do not use beyond 5 days, as your nose may become dependent on it.     Return clinic visit in about 4 weeks. Sooner if symptoms  worsen.        Cristian Marion MD  [facility]   [facility]  [address] [address] [facility].  [place] [address]  P: [phone]  F: [phone]

## 2023-04-14 ENCOUNTER — TELEPHONE (OUTPATIENT)
Dept: PULMONOLOGY | Facility: CLINIC | Age: 53
End: 2023-04-14
Payer: COMMERCIAL

## 2023-04-14 NOTE — TELEPHONE ENCOUNTER
----- Message from Iris Adan sent at 4/14/2023 11:20 AM CDT -----  Contact: Shama Hernandez is calling to speak to the nurse regarding scheduling a appointment to  c pap. Please give him a call at [phone]    Thanks  LJ

## 2023-05-09 ENCOUNTER — OFFICE VISIT (OUTPATIENT)
Dept: OTOLARYNGOLOGY | Facility: CLINIC | Age: 53
End: 2023-05-09
Payer: COMMERCIAL

## 2023-05-09 ENCOUNTER — CLINICAL SUPPORT (OUTPATIENT)
Dept: AUDIOLOGY | Facility: CLINIC | Age: 53
End: 2023-05-09
Payer: COMMERCIAL

## 2023-05-09 DIAGNOSIS — J30.9 ALLERGIC RHINITIS, UNSPECIFIED SEASONALITY, UNSPECIFIED TRIGGER: ICD-10-CM

## 2023-05-09 DIAGNOSIS — H69.93 ETD (EUSTACHIAN TUBE DYSFUNCTION), BILATERAL: Primary | ICD-10-CM

## 2023-05-09 PROCEDURE — 92567 PR TYMPA2METRY: ICD-10-PCS | Mod: S$GLB,,, | Performed by: AUDIOLOGIST-HEARING AID FITTER

## 2023-05-09 PROCEDURE — 99214 OFFICE O/P EST MOD 30 MIN: CPT | Mod: S$GLB,,, | Performed by: STUDENT IN AN ORGANIZED HEALTH CARE EDUCATION/TRAINING PROGRAM

## 2023-05-09 PROCEDURE — 92557 PR COMPREHENSIVE HEARING TEST: ICD-10-PCS | Mod: S$GLB,,, | Performed by: AUDIOLOGIST-HEARING AID FITTER

## 2023-05-09 PROCEDURE — 99999 PR PBB SHADOW E&M-EST. PATIENT-LVL I: CPT | Mod: PBBFAC,,, | Performed by: AUDIOLOGIST-HEARING AID FITTER

## 2023-05-09 PROCEDURE — 92567 TYMPANOMETRY: CPT | Mod: S$GLB,,, | Performed by: AUDIOLOGIST-HEARING AID FITTER

## 2023-05-09 PROCEDURE — 99999 PR PBB SHADOW E&M-EST. PATIENT-LVL I: ICD-10-PCS | Mod: PBBFAC,,, | Performed by: AUDIOLOGIST-HEARING AID FITTER

## 2023-05-09 PROCEDURE — 99214 PR OFFICE/OUTPT VISIT, EST, LEVL IV, 30-39 MIN: ICD-10-PCS | Mod: S$GLB,,, | Performed by: STUDENT IN AN ORGANIZED HEALTH CARE EDUCATION/TRAINING PROGRAM

## 2023-05-09 PROCEDURE — 99999 PR PBB SHADOW E&M-EST. PATIENT-LVL II: CPT | Mod: PBBFAC,,, | Performed by: STUDENT IN AN ORGANIZED HEALTH CARE EDUCATION/TRAINING PROGRAM

## 2023-05-09 PROCEDURE — 99999 PR PBB SHADOW E&M-EST. PATIENT-LVL II: ICD-10-PCS | Mod: PBBFAC,,, | Performed by: STUDENT IN AN ORGANIZED HEALTH CARE EDUCATION/TRAINING PROGRAM

## 2023-05-09 PROCEDURE — 92557 COMPREHENSIVE HEARING TEST: CPT | Mod: S$GLB,,, | Performed by: AUDIOLOGIST-HEARING AID FITTER

## 2023-05-09 RX ORDER — AZELASTINE HYDROCHLORIDE, FLUTICASONE PROPIONATE 137; 50 UG/1; UG/1
1 SPRAY, METERED NASAL 2 TIMES DAILY
Qty: 23 G | Refills: 0 | Status: SHIPPED | OUTPATIENT
Start: 2023-05-09

## 2023-05-09 NOTE — PROGRESS NOTES
Chief complaint:   No chief complaint on file.         Referring Provider:  No referring provider defined for this encounter.    History of Present Illness:     Mr. Clark is a 52 y.o. male presenting for evaluation of ear fullness, popping, plugged, muffled hearing (worse on the left). Comes and goes on the right. Onset of this chief complaint was about 2 weeks ago after a flight.  The patient has tried auto-insufflating with brief relief.  The patient denies otalgia, vertigo.  Has had similar episodes in the past with flights. This has been a chronic issue, but this is the worst episode he has had.    The patient denies significant hearing loss risk factors, ototoxic medication exposure, chronic vestibular suppressant use, head/ facial/ la trauma, and otologic surgery.      Update 5/9/23  Started feeling better after a week or so. Completed the steroids. Did gain weight.     Still having occasional ear popping. Otherwise no pain, hearing back to baseline.     Saves afrin for flights.    Does have issues with nasal congestion, rhinorrhea, sneezing.   Using dymista.     Prior history frequent sinus infections. No recent.     History        Past Medical History:   Past Medical History:   Diagnosis Date    Back pain     Dr. Arun Ferreira    HTN (hypertension)     Seasonal allergies     .          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         Medications: Medication list was reviewed. He  has a current medication list which includes the following  prescription(s): amlodipine, azelastine-fluticasone, betamethasone valerate, buspirone, celacyn, celecoxib, cyclobenzaprine, desoximetasone, fluticasone propionate, gralise, ketoconazole, neomycin-polymyxin-dexamethasone, polyethylene glycol, prednisone, and texacort.         Allergies: Review of patient's allergies indicates:  No Known Allergies         Family history: family history includes Alcohol abuse in his mother; Cancer in his maternal grandfather; Heart attack (age of onset: 59) in his maternal aunt; Heart disease in his father, maternal aunt, maternal grandmother, and mother; Stroke in his father and mother.         Social History          Alcohol use:  reports no history of alcohol use.            Tobacco:  reports that he has never smoked. He has never used smokeless tobacco.         Please see the patient's intake form for full details of past medical history, past surgical history, family history, social history and review of systems. ?This information was reviewed by me and noted.      Physical Examination     General: Well developed, well nourished, well hydrated. Verbal with a strong voice and not dysphonic.     Head/Face: Normocephalic, atraumatic. No scars or lesions. Facial musculature equal.     Eyes: No scleral icterus or conjunctival hemorrhage. EOMI. PERRLA.     Ears:     Right ear: No gross deformity. EAC is clear of debris and erythema. The TM is intact with a pneumatized middle ear.    Left ear: No gross deformity. EAC is clear of debris and erythema. The TM is intact with a pneumatized middle ear.    Hearing: grossly intact    Nose: No gross deformity or lesions. No purulent discharge. No significant NSD.      Mouth/Oropharynx: Lips without any lesions. No mucosal lesions within the oropharynx. No tonsillar exudate or lesions. Pharyngeal walls symmetrical. Uvula midline. Tongue midline without lesions.     Neck: Trachea midline. No masses. No thyromegaly or nodules palpated.      Lymphatic: No lymphadenopathy in the neck.     Extremities: No cyanosis. Warm and well-perfused.     Skin: No scars or lesions on face or neck.      Neurologic: Moving all extremities without gross abnormality.CN II-XII grossly intact. House-Brackmann 1/6. No signs of nystagmus.      Psych: Alert and oriented to person, place, and time with an appropriate mood and affect.     Data review:    Review of records:      I reviewed records from the referring provider's office visits.  These describe the history, workup, and/or treatment of this problem thus far.    Audiogram     Audiogram 5/9/23 was independently reviewed          Assessment/Plan:      1. ETD (Eustachian tube dysfunction), bilateral    2. OME (otitis media with effusion), left        Doing better, symptoms mild  Continue dymista for AR. Add saline irrigations, especially when in reshma environments  Afrin for flights  Discussed options if becomes more persists, worsens - tubes, ET dilation  Also discussed returning for nasal endoscopy and further sinus workup if allergy/sinus symptoms worsen  Return to clinic if worsening        Cristian Marion MD  [facility]   [facility] [address]  [place] [address]  P: [phone]  F: [phone]

## 2023-05-09 NOTE — PROGRESS NOTES
Jimi Clark was seen 05/09/2023 for an audiological evaluation.  Recently treated for   1. Allergic rhinitis, unspecified seasonality, unspecified trigger    2. ETD (Eustachian tube dysfunction), bilateral    3. OME (otitis media with effusion), left      The last audiogram was 11/13/2013 - WNL, [place].     Results reveal normal hearing 250-8000 Hz for the right ear, and normal hearing 250-8000 Hz for the left ear.   Speech Reception Thresholds were  10 dBHL for the right ear and 10 dBHL for the left ear.   Word recognition scores were excellent for the right ear and excellent for the left ear.   Tympanograms were Type C for the right ear and Type A for the left ear.    Otoscopy was unremarkable bilaterally.     Patient was counseled on the above findings.    Recommendations:  1. ENT appt today   2. Audiograms as needed   3. Hearing protection in hazardous noise

## 2023-05-11 ENCOUNTER — PATIENT MESSAGE (OUTPATIENT)
Dept: PULMONOLOGY | Facility: CLINIC | Age: 53
End: 2023-05-11
Payer: COMMERCIAL

## 2023-05-18 ENCOUNTER — OFFICE VISIT (OUTPATIENT)
Dept: PULMONOLOGY | Facility: CLINIC | Age: 53
End: 2023-05-18
Payer: COMMERCIAL

## 2023-05-18 VITALS
SYSTOLIC BLOOD PRESSURE: 130 MMHG | RESPIRATION RATE: 16 BRPM | DIASTOLIC BLOOD PRESSURE: 82 MMHG | BODY MASS INDEX: 34.13 KG/M2 | HEIGHT: 72 IN | WEIGHT: 252 LBS | OXYGEN SATURATION: 98 % | HEART RATE: 62 BPM

## 2023-05-18 DIAGNOSIS — G47.33 OSA (OBSTRUCTIVE SLEEP APNEA): Primary | ICD-10-CM

## 2023-05-18 DIAGNOSIS — G47.26 SHIFTING SLEEP-WORK SCHEDULE: ICD-10-CM

## 2023-05-18 DIAGNOSIS — G47.00 INSOMNIA, UNSPECIFIED TYPE: ICD-10-CM

## 2023-05-18 PROCEDURE — 99999 PR PBB SHADOW E&M-EST. PATIENT-LVL V: CPT | Mod: PBBFAC,,, | Performed by: NURSE PRACTITIONER

## 2023-05-18 PROCEDURE — 99214 PR OFFICE/OUTPT VISIT, EST, LEVL IV, 30-39 MIN: ICD-10-PCS | Mod: S$GLB,,, | Performed by: NURSE PRACTITIONER

## 2023-05-18 PROCEDURE — 3008F BODY MASS INDEX DOCD: CPT | Mod: CPTII,S$GLB,, | Performed by: NURSE PRACTITIONER

## 2023-05-18 PROCEDURE — 3075F PR MOST RECENT SYSTOLIC BLOOD PRESS GE 130-139MM HG: ICD-10-PCS | Mod: CPTII,S$GLB,, | Performed by: NURSE PRACTITIONER

## 2023-05-18 PROCEDURE — 1159F PR MEDICATION LIST DOCUMENTED IN MEDICAL RECORD: ICD-10-PCS | Mod: CPTII,S$GLB,, | Performed by: NURSE PRACTITIONER

## 2023-05-18 PROCEDURE — 99214 OFFICE O/P EST MOD 30 MIN: CPT | Mod: S$GLB,,, | Performed by: NURSE PRACTITIONER

## 2023-05-18 PROCEDURE — 3079F DIAST BP 80-89 MM HG: CPT | Mod: CPTII,S$GLB,, | Performed by: NURSE PRACTITIONER

## 2023-05-18 PROCEDURE — 1159F MED LIST DOCD IN RCRD: CPT | Mod: CPTII,S$GLB,, | Performed by: NURSE PRACTITIONER

## 2023-05-18 PROCEDURE — 1160F RVW MEDS BY RX/DR IN RCRD: CPT | Mod: CPTII,S$GLB,, | Performed by: NURSE PRACTITIONER

## 2023-05-18 PROCEDURE — 3075F SYST BP GE 130 - 139MM HG: CPT | Mod: CPTII,S$GLB,, | Performed by: NURSE PRACTITIONER

## 2023-05-18 PROCEDURE — 3079F PR MOST RECENT DIASTOLIC BLOOD PRESSURE 80-89 MM HG: ICD-10-PCS | Mod: CPTII,S$GLB,, | Performed by: NURSE PRACTITIONER

## 2023-05-18 PROCEDURE — 99999 PR PBB SHADOW E&M-EST. PATIENT-LVL V: ICD-10-PCS | Mod: PBBFAC,,, | Performed by: NURSE PRACTITIONER

## 2023-05-18 PROCEDURE — 1160F PR REVIEW ALL MEDS BY PRESCRIBER/CLIN PHARMACIST DOCUMENTED: ICD-10-PCS | Mod: CPTII,S$GLB,, | Performed by: NURSE PRACTITIONER

## 2023-05-18 PROCEDURE — 3008F PR BODY MASS INDEX (BMI) DOCUMENTED: ICD-10-PCS | Mod: CPTII,S$GLB,, | Performed by: NURSE PRACTITIONER

## 2023-05-18 RX ORDER — LEVOCETIRIZINE DIHYDROCHLORIDE 5 MG/1
1 TABLET, FILM COATED ORAL NIGHTLY
COMMUNITY
Start: 2023-04-26 | End: 2024-04-25

## 2023-05-18 NOTE — PROGRESS NOTES
Subjective:      Patient ID: Jimi Clark is a 52 y.o. male.    Chief Complaint: Sleep Apnea    HPI  Presents to office for review of AutoPAP therapy. Patient states improved symptoms with use of AutoPAP. Sleeping more soundly. Waking up feeling more refreshed. Improved daytime sleepiness. Patient states he is benefiting from use of the AutoPAP.   He has been on the APAP a little less than a month. He works shift work. Sleep ranges from 4 hour- 9 hours. BMI 34    Patient Active Problem List   Diagnosis    Allergic rhinitis    Essential hypertension    EKG abnormalities    Family history of vascular disease    Back pain    Lower abdominal pain    Change in bowel function    RUQ abdominal pain    Colon polyps    Decreased ROM of ankle    Daytime sleepiness     /82   Pulse 62   Resp 16   Ht 6' (1.829 m)   Wt 114.3 kg (251 lb 15.8 oz)   SpO2 98%   BMI 34.18 kg/m²   Body mass index is 34.18 kg/m².    Review of Systems   Constitutional: Negative.    HENT: Negative.     Respiratory: Negative.     Cardiovascular: Negative.    Musculoskeletal: Negative.    Gastrointestinal: Negative.    Neurological: Negative.    Psychiatric/Behavioral: Negative.     Objective:      Physical Exam  Constitutional:       Appearance: He is well-developed. He is obese.   HENT:      Head: Normocephalic and atraumatic.      Nose: Nose normal.   Neck:      Thyroid: No thyroid mass or thyromegaly.      Trachea: Trachea normal.   Cardiovascular:      Rate and Rhythm: Normal rate and regular rhythm.      Heart sounds: Normal heart sounds.   Pulmonary:      Effort: Pulmonary effort is normal.      Breath sounds: Normal breath sounds. No wheezing, rhonchi or rales.   Abdominal:      Palpations: Abdomen is soft. There is no splenomegaly.      Tenderness: There is no abdominal tenderness.   Musculoskeletal:         General: Normal range of motion.      Cervical back: Normal range of motion and neck supple.   Skin:     General: Skin is  warm and dry.   Neurological:      Mental Status: He is alert and oriented to person, place, and time.   Psychiatric:         Mood and Affect: Mood normal.         Behavior: Behavior normal.     Personal Diagnostic Review  Review of PAP download. Special study media link attached to this encounter. Normal AHI .         Assessment:       1. LEIGHANN (obstructive sleep apnea)    2. Shifting sleep-work schedule    3. Insomnia, unspecified type        Outpatient Encounter Medications as of 2023   Medication Sig Dispense Refill    azelastine-fluticasone (DYMISTA) 137-50 mcg/spray Spry nassal spray 1 spray by Each Nostril route 2 (two) times daily. 23 g 0    betamethasone valerate (LUXIQ) 0.12 % foam 2 (two) times daily.  2    busPIRone (BUSPAR) 10 MG tablet TAKE 1 TABLET BY MOUTH TWICE A  tablet 1    CELACYN GlwP APPLY 1 GRAM ON THE SKIN BID AA  3    celecoxib (CELEBREX) 200 MG capsule Take 200 mg by mouth 2 (two) times daily.      cyclobenzaprine (FLEXERIL) 10 MG tablet Take 10 mg by mouth daily as needed.      desoximetasone 0.05 % Oint ELODIA TO CHEST BID  1    GRALISE 600 mg Tb24 SMARTSI Tablet(s) By Mouth Every Evening      ketoconazole (NIZORAL) 2 % shampoo       levocetirizine (XYZAL) 5 MG tablet Take 1 tablet by mouth every evening.      loratadine-pseudoephedrine 5-120 mg (CLARITIN-D 12-HOUR) 5-120 mg per tablet Take 1 tablet by mouth.      neomycin-polymyxin-dexamethasone (DEXACINE) 3.5 mg/g-10,000 unit/g-0.1 % Oint SMARTSI.125 Inch(es) In Eye(s) Twice Daily      polyethylene glycol (GLYCOLAX) 17 gram PwPk 1 pack every other day for a couple of weeks to regulate bowels 10 each 0    TEXACORT 2.5 % Soln APPLY ON THE SKIN QOD WITH QTIP  2    amLODIPine (NORVASC) 10 MG tablet Take 1 tablet (10 mg total) by mouth once daily. 90 tablet 3    predniSONE (DELTASONE) 10 MG tablet Take 1 tablet (10 mg total) by mouth once daily. Take 3 tablets a day for 3 days (1before breakfast, 1 after lunch, 1 before bed)  Then take 2 tablets a day for 3 days (1 before breakfast, 1 before bed) Then take 1 tablet a day for 3 days (1 before breakfast) 18 tablet 0    [DISCONTINUED] azelastine-fluticasone 137-50 mcg/spray Pluckemin 1 spray by Nasal route.      [DISCONTINUED] fluticasone propionate (FLONASE) 50 mcg/actuation nasal spray 1 spray (50 mcg total) by Each Nostril route once daily. 16 g 0     No facility-administered encounter medications on file as of 5/18/2023.     No orders of the defined types were placed in this encounter.    Plan:     Reaching compliance. Follow up around 90 day after set up date.   Benefits from use.   Weight loss and exercise to improve overall health.   Try to schedule more sleep time   Problem List Items Addressed This Visit    None  Visit Diagnoses       LEIGHANN (obstructive sleep apnea)    -  Primary    Shifting sleep-work schedule        Insomnia, unspecified type                             Elizabeth LeJeune, ACNP, ANP

## 2023-05-23 ENCOUNTER — TELEPHONE (OUTPATIENT)
Dept: PULMONOLOGY | Facility: CLINIC | Age: 53
End: 2023-05-23
Payer: COMMERCIAL

## 2023-05-23 DIAGNOSIS — G47.33 OSA (OBSTRUCTIVE SLEEP APNEA): Primary | ICD-10-CM

## 2023-05-23 NOTE — TELEPHONE ENCOUNTER
----- Message from Danette Delgadillo sent at 5/23/2023  2:13 PM CDT -----  Contact: [phone]  Pt is calling in regards to his cpap machine. Pt stated that he received a notification that the filter is needing to be changed. Please call pt back at [phone]. Thanks KB

## 2023-05-23 NOTE — TELEPHONE ENCOUNTER
Spoke to pt.  Informed him that I am attempting to get a cpap supply order put in for him and will call him back when done.

## 2023-05-24 ENCOUNTER — OFFICE VISIT (OUTPATIENT)
Dept: INTERNAL MEDICINE | Facility: CLINIC | Age: 53
End: 2023-05-24
Payer: COMMERCIAL

## 2023-05-24 VITALS
HEART RATE: 70 BPM | DIASTOLIC BLOOD PRESSURE: 82 MMHG | BODY MASS INDEX: 34.13 KG/M2 | WEIGHT: 252 LBS | TEMPERATURE: 98 F | OXYGEN SATURATION: 98 % | SYSTOLIC BLOOD PRESSURE: 142 MMHG | HEIGHT: 72 IN

## 2023-05-24 DIAGNOSIS — Z63.8 CAREGIVER ROLE STRAIN: Primary | ICD-10-CM

## 2023-05-24 PROCEDURE — 99213 PR OFFICE/OUTPT VISIT, EST, LEVL III, 20-29 MIN: ICD-10-PCS | Mod: S$GLB,,, | Performed by: NURSE PRACTITIONER

## 2023-05-24 PROCEDURE — 1160F RVW MEDS BY RX/DR IN RCRD: CPT | Mod: CPTII,S$GLB,, | Performed by: NURSE PRACTITIONER

## 2023-05-24 PROCEDURE — 3008F BODY MASS INDEX DOCD: CPT | Mod: CPTII,S$GLB,, | Performed by: NURSE PRACTITIONER

## 2023-05-24 PROCEDURE — 3077F PR MOST RECENT SYSTOLIC BLOOD PRESSURE >= 140 MM HG: ICD-10-PCS | Mod: CPTII,S$GLB,, | Performed by: NURSE PRACTITIONER

## 2023-05-24 PROCEDURE — 1159F MED LIST DOCD IN RCRD: CPT | Mod: CPTII,S$GLB,, | Performed by: NURSE PRACTITIONER

## 2023-05-24 PROCEDURE — 99213 OFFICE O/P EST LOW 20 MIN: CPT | Mod: S$GLB,,, | Performed by: NURSE PRACTITIONER

## 2023-05-24 PROCEDURE — 3079F DIAST BP 80-89 MM HG: CPT | Mod: CPTII,S$GLB,, | Performed by: NURSE PRACTITIONER

## 2023-05-24 PROCEDURE — 3008F PR BODY MASS INDEX (BMI) DOCUMENTED: ICD-10-PCS | Mod: CPTII,S$GLB,, | Performed by: NURSE PRACTITIONER

## 2023-05-24 PROCEDURE — 99999 PR PBB SHADOW E&M-EST. PATIENT-LVL V: CPT | Mod: PBBFAC,,, | Performed by: NURSE PRACTITIONER

## 2023-05-24 PROCEDURE — 3079F PR MOST RECENT DIASTOLIC BLOOD PRESSURE 80-89 MM HG: ICD-10-PCS | Mod: CPTII,S$GLB,, | Performed by: NURSE PRACTITIONER

## 2023-05-24 PROCEDURE — 99999 PR PBB SHADOW E&M-EST. PATIENT-LVL V: ICD-10-PCS | Mod: PBBFAC,,, | Performed by: NURSE PRACTITIONER

## 2023-05-24 PROCEDURE — 3077F SYST BP >= 140 MM HG: CPT | Mod: CPTII,S$GLB,, | Performed by: NURSE PRACTITIONER

## 2023-05-24 PROCEDURE — 1159F PR MEDICATION LIST DOCUMENTED IN MEDICAL RECORD: ICD-10-PCS | Mod: CPTII,S$GLB,, | Performed by: NURSE PRACTITIONER

## 2023-05-24 PROCEDURE — 1160F PR REVIEW ALL MEDS BY PRESCRIBER/CLIN PHARMACIST DOCUMENTED: ICD-10-PCS | Mod: CPTII,S$GLB,, | Performed by: NURSE PRACTITIONER

## 2023-05-24 SDOH — SOCIAL DETERMINANTS OF HEALTH (SDOH): OTHER SPECIFIED PROBLEMS RELATED TO PRIMARY SUPPORT GROUP: Z63.8

## 2023-05-24 NOTE — PROGRESS NOTES
Subjective:       Patient ID: Jimi Clark is a 52 y.o. male.    Chief Complaint: Follow-up    52 year old male here for fmla  He states that his mom had MI and stroke over the weekend  Sat during the day his mom was starting to drop things; they took her to the ER and was told she had stroke and heart attack   Patient states he has been out of work helping his mom; last day worked was last sat 5/20  His mom will be getting in a rehab today at [facility]   Mom completed round 5 of chemo for lung cancer    Patient saw Dr. WEBB in dec for acute stress reaction; she recommended counseling; he has not done it because he has been busy taking his mom to chemo      BP (!) 142/82   Pulse 70   Temp 97.7 °F (36.5 °C) (Tympanic)   Ht 6' (1.829 m)   Wt 114.3 kg (251 lb 15.8 oz)   SpO2 98%   BMI 34.18 kg/m²     Review of Systems   Constitutional:  Negative for activity change, appetite change, chills, diaphoresis, fatigue, fever and unexpected weight change.   HENT: Negative.     Eyes: Negative.    Respiratory:  Negative for apnea, chest tightness, shortness of breath and stridor.    Cardiovascular:  Negative for chest pain, palpitations and leg swelling.   Gastrointestinal: Negative.    Endocrine: Negative.    Genitourinary: Negative.    Musculoskeletal:  Negative for arthralgias and myalgias.   Skin:  Negative for color change, pallor, rash and wound.   Allergic/Immunologic: Negative.    Neurological:  Negative for dizziness, facial asymmetry, light-headedness and headaches.   Hematological:  Negative for adenopathy.   Psychiatric/Behavioral:  Negative for agitation and behavioral problems.      Objective:      Physical Exam  Vitals and nursing note reviewed.   Constitutional:       General: He is not in acute distress.     Appearance: He is well-developed. He is not diaphoretic.   HENT:      Head: Normocephalic and atraumatic.   Cardiovascular:      Rate and Rhythm: Normal rate and regular rhythm.      Heart  sounds: Normal heart sounds.   Pulmonary:      Effort: Pulmonary effort is normal. No respiratory distress.      Breath sounds: Normal breath sounds.   Skin:     General: Skin is warm and dry.      Findings: No rash.   Neurological:      Mental Status: He is alert and oriented to person, place, and time.   Psychiatric:         Behavior: Behavior normal.         Thought Content: Thought content normal.         Judgment: Judgment normal.       Assessment:       1. Caregiver role strain        Plan:       Jimi was seen today for follow-up.    Diagnoses and all orders for this visit:    Caregiver role strain      Patient wants to take fmla to protect his job while he cares for his mother  Dr. WEBB will need to review papers and give recommendations

## 2023-05-29 ENCOUNTER — TELEPHONE (OUTPATIENT)
Dept: INTERNAL MEDICINE | Facility: CLINIC | Age: 53
End: 2023-05-29
Payer: COMMERCIAL

## 2023-05-29 NOTE — TELEPHONE ENCOUNTER
----- Message from Denton Cates sent at 5/29/2023  2:27 PM CDT -----  Contact: Shama Arauz is calling in regards to wanting to know if Blanca Ibarra filled out paper work FMLA. Please call back at [phone]          Thanks  KT

## 2023-06-21 DIAGNOSIS — I10 ESSENTIAL HYPERTENSION: ICD-10-CM

## 2023-07-07 ENCOUNTER — PATIENT MESSAGE (OUTPATIENT)
Dept: INFECTIOUS DISEASES | Facility: CLINIC | Age: 53
End: 2023-07-07
Payer: COMMERCIAL

## 2023-08-17 RX ORDER — AMLODIPINE BESYLATE 10 MG/1
10 TABLET ORAL
Qty: 90 TABLET | Refills: 3 | Status: SHIPPED | OUTPATIENT
Start: 2023-08-17

## 2023-08-17 NOTE — TELEPHONE ENCOUNTER
Refill Routing Note   Medication(s) are not appropriate for processing by [facility] for the following reason(s):      Required vitals abnormal    [facility] action(s):  Defer Care Due:  None identified            Appointments  past 12m or future 3m with PCP    Date Provider   Last Visit   12/13/2022 Mack Shine MD   Next Visit   Visit date not found Mack Shine MD   ED visits in past 90 days: 0        Note composed:4:34 AM 08/17/2023

## 2023-08-17 NOTE — TELEPHONE ENCOUNTER
No care due was identified.  [facility] Embedded Care Due Messages. Reference number: [id number].   8/17/2023 12:17:39 AM CDT

## 2023-08-22 ENCOUNTER — PATIENT MESSAGE (OUTPATIENT)
Dept: ADMINISTRATIVE | Facility: OTHER | Age: 53
End: 2023-08-22
Payer: COMMERCIAL

## 2023-08-24 ENCOUNTER — OFFICE VISIT (OUTPATIENT)
Dept: PULMONOLOGY | Facility: CLINIC | Age: 53
End: 2023-08-24
Payer: COMMERCIAL

## 2023-08-24 VITALS
SYSTOLIC BLOOD PRESSURE: 128 MMHG | RESPIRATION RATE: 18 BRPM | WEIGHT: 257.94 LBS | HEART RATE: 85 BPM | DIASTOLIC BLOOD PRESSURE: 82 MMHG | BODY MASS INDEX: 34.94 KG/M2 | HEIGHT: 72 IN | OXYGEN SATURATION: 98 %

## 2023-08-24 DIAGNOSIS — G47.33 OSA (OBSTRUCTIVE SLEEP APNEA): Primary | ICD-10-CM

## 2023-08-24 DIAGNOSIS — G47.26 SHIFTING SLEEP-WORK SCHEDULE: ICD-10-CM

## 2023-08-24 DIAGNOSIS — E66.9 OBESITY, CLASS I, BMI 30-34.9: ICD-10-CM

## 2023-08-24 PROCEDURE — 3079F PR MOST RECENT DIASTOLIC BLOOD PRESSURE 80-89 MM HG: ICD-10-PCS | Mod: CPTII,S$GLB,, | Performed by: NURSE PRACTITIONER

## 2023-08-24 PROCEDURE — 99999 PR PBB SHADOW E&M-EST. PATIENT-LVL V: ICD-10-PCS | Mod: PBBFAC,,, | Performed by: NURSE PRACTITIONER

## 2023-08-24 PROCEDURE — 99214 PR OFFICE/OUTPT VISIT, EST, LEVL IV, 30-39 MIN: ICD-10-PCS | Mod: S$GLB,,, | Performed by: NURSE PRACTITIONER

## 2023-08-24 PROCEDURE — 1159F PR MEDICATION LIST DOCUMENTED IN MEDICAL RECORD: ICD-10-PCS | Mod: CPTII,S$GLB,, | Performed by: NURSE PRACTITIONER

## 2023-08-24 PROCEDURE — 3074F SYST BP LT 130 MM HG: CPT | Mod: CPTII,S$GLB,, | Performed by: NURSE PRACTITIONER

## 2023-08-24 PROCEDURE — 4010F ACE/ARB THERAPY RXD/TAKEN: CPT | Mod: CPTII,S$GLB,, | Performed by: NURSE PRACTITIONER

## 2023-08-24 PROCEDURE — 3079F DIAST BP 80-89 MM HG: CPT | Mod: CPTII,S$GLB,, | Performed by: NURSE PRACTITIONER

## 2023-08-24 PROCEDURE — 99999 PR PBB SHADOW E&M-EST. PATIENT-LVL V: CPT | Mod: PBBFAC,,, | Performed by: NURSE PRACTITIONER

## 2023-08-24 PROCEDURE — 1160F PR REVIEW ALL MEDS BY PRESCRIBER/CLIN PHARMACIST DOCUMENTED: ICD-10-PCS | Mod: CPTII,S$GLB,, | Performed by: NURSE PRACTITIONER

## 2023-08-24 PROCEDURE — 1159F MED LIST DOCD IN RCRD: CPT | Mod: CPTII,S$GLB,, | Performed by: NURSE PRACTITIONER

## 2023-08-24 PROCEDURE — 3008F PR BODY MASS INDEX (BMI) DOCUMENTED: ICD-10-PCS | Mod: CPTII,S$GLB,, | Performed by: NURSE PRACTITIONER

## 2023-08-24 PROCEDURE — 1160F RVW MEDS BY RX/DR IN RCRD: CPT | Mod: CPTII,S$GLB,, | Performed by: NURSE PRACTITIONER

## 2023-08-24 PROCEDURE — 99214 OFFICE O/P EST MOD 30 MIN: CPT | Mod: S$GLB,,, | Performed by: NURSE PRACTITIONER

## 2023-08-24 PROCEDURE — 4010F PR ACE/ARB THEARPY RXD/TAKEN: ICD-10-PCS | Mod: CPTII,S$GLB,, | Performed by: NURSE PRACTITIONER

## 2023-08-24 PROCEDURE — 3008F BODY MASS INDEX DOCD: CPT | Mod: CPTII,S$GLB,, | Performed by: NURSE PRACTITIONER

## 2023-08-24 PROCEDURE — 3074F PR MOST RECENT SYSTOLIC BLOOD PRESSURE < 130 MM HG: ICD-10-PCS | Mod: CPTII,S$GLB,, | Performed by: NURSE PRACTITIONER

## 2023-08-24 RX ORDER — CITALOPRAM 10 MG/1
1 TABLET ORAL EVERY MORNING
COMMUNITY
Start: 2023-06-05 | End: 2024-06-04

## 2023-08-24 RX ORDER — LOSARTAN POTASSIUM 100 MG/1
100 TABLET ORAL EVERY MORNING
COMMUNITY
Start: 2023-06-05

## 2023-08-24 NOTE — PROGRESS NOTES
Subjective:      Patient ID: Jimi Clark is a 52 y.o. male.    Chief Complaint: Sleep Apnea    HPI  Presents to office for review of AutoPAP therapy. Patient states improved symptoms with use of AutoPAP. Sleeping more soundly. Waking up feeling more refreshed. Improved daytime sleepiness. Patient states he is benefiting from use of the AutoPAP. 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 Not getting enough sleep on some days.    Patient Active Problem List   Diagnosis    Allergic rhinitis    Essential hypertension    EKG abnormalities    Family history of vascular disease    Back pain    Lower abdominal pain    Change in bowel function    RUQ abdominal pain    Colon polyps    Decreased ROM of ankle    Daytime sleepiness     /82   Pulse 85   Resp 18   Ht 6' (1.829 m)   Wt 117 kg (257 lb 15 oz)   SpO2 98%   BMI 34.98 kg/m²   Body mass index is 34.98 kg/m².    Review of Systems   Constitutional:  Positive for fatigue.   HENT: Negative.     Respiratory: Negative.     Cardiovascular: Negative.    Musculoskeletal: Negative.    Gastrointestinal: Negative.    Neurological: Negative.    Psychiatric/Behavioral: Negative.       Objective:      Physical Exam  Constitutional:       Appearance: He is well-developed. He is obese.   HENT:      Head: Normocephalic and atraumatic.   Musculoskeletal:      Cervical back: Normal range of motion and neck supple.   Skin:     General: Skin is warm and dry.   Neurological:      Mental Status: He is alert and oriented to person, place, and time.       Personal Diagnostic Review  Review of PAP download. Special study media link attached to this encounter. Normal AHI and compliant.   Assessment:       1. LEIGHANN (obstructive sleep apnea)    2. Shifting sleep-work schedule    3. Obesity, Class I, BMI 30-34.9        Outpatient Encounter Medications as of 8/24/2023   Medication Sig Dispense Refill    amLODIPine (NORVASC) 10 MG tablet TAKE 1 TABLET BY MOUTH EVERY DAY 90 tablet 3     azelastine-fluticasone (DYMISTA) 137-50 mcg/spray Spry nassal spray 1 spray by Each Nostril route 2 (two) times daily. 23 g 0    betamethasone valerate (LUXIQ) 0.12 % foam 2 (two) times daily.  2    CELACYN GlwP APPLY 1 GRAM ON THE SKIN BID AA  3    celecoxib (CELEBREX) 200 MG capsule Take 200 mg by mouth 2 (two) times daily.      citalopram (CELEXA) 10 MG tablet Take 1 tablet by mouth every morning.      cyclobenzaprine (FLEXERIL) 10 MG tablet Take 10 mg by mouth daily as needed.      desoximetasone 0.05 % Oint ELODIA TO CHEST BID  1    GRALISE 600 mg Tb24 SMARTSI Tablet(s) By Mouth Every Evening      ketoconazole (NIZORAL) 2 % shampoo       levocetirizine (XYZAL) 5 MG tablet Take 1 tablet by mouth every evening.      loratadine-pseudoephedrine 5-120 mg (CLARITIN-D 12-HOUR) 5-120 mg per tablet Take 1 tablet by mouth.      losartan (COZAAR) 100 MG tablet Take 100 mg by mouth every morning.      neomycin-polymyxin-dexamethasone (DEXACINE) 3.5 mg/g-10,000 unit/g-0.1 % Oint SMARTSI.125 Inch(es) In Eye(s) Twice Daily      polyethylene glycol (GLYCOLAX) 17 gram PwPk 1 pack every other day for a couple of weeks to regulate bowels 10 each 0    TEXACORT 2.5 % Soln APPLY ON THE SKIN QOD WITH QTIP  2    busPIRone (BUSPAR) 10 MG tablet TAKE 1 TABLET BY MOUTH TWICE A DAY (Patient not taking: Reported on 2023) 180 tablet 1    predniSONE (DELTASONE) 10 MG tablet Take 1 tablet (10 mg total) by mouth once daily. Take 3 tablets a day for 3 days (1before breakfast, 1 after lunch, 1 before bed) Then take 2 tablets a day for 3 days (1 before breakfast, 1 before bed) Then take 1 tablet a day for 3 days (1 before breakfast) (Patient not taking: Reported on 2023) 18 tablet 0    [DISCONTINUED] amLODIPine (NORVASC) 10 MG tablet Take 1 tablet (10 mg total) by mouth once daily. 90 tablet 3     No facility-administered encounter medications on file as of 2023.     No orders of the defined types were placed in this  encounter.    Plan:     Compliant with PAP and benefits from use. Follow up annually in the sleep clinic.  Weight loss and exercise to improve overall health.  Try to schedule 7-8 sleep    Problem List Items Addressed This Visit    None  Visit Diagnoses       LEIGHANN (obstructive sleep apnea)    -  Primary    Shifting sleep-work schedule        Obesity, Class I, BMI 30-34.9                             Elizabeth LeJeune, ACNP, ANP

## 2023-11-17 ENCOUNTER — OFFICE VISIT (OUTPATIENT)
Dept: URGENT CARE | Facility: CLINIC | Age: 53
End: 2023-11-17
Payer: COMMERCIAL

## 2023-11-17 VITALS
RESPIRATION RATE: 18 BRPM | BODY MASS INDEX: 31.08 KG/M2 | DIASTOLIC BLOOD PRESSURE: 76 MMHG | WEIGHT: 250 LBS | OXYGEN SATURATION: 98 % | HEIGHT: 75 IN | TEMPERATURE: 98 F | HEART RATE: 72 BPM | SYSTOLIC BLOOD PRESSURE: 131 MMHG

## 2023-11-17 DIAGNOSIS — M54.50 ACUTE BILATERAL LOW BACK PAIN, UNSPECIFIED WHETHER SCIATICA PRESENT: Primary | ICD-10-CM

## 2023-11-17 DIAGNOSIS — M79.18 MUSCLE PAIN, LUMBAR: ICD-10-CM

## 2023-11-17 LAB
BILIRUB UR QL STRIP: NEGATIVE
GLUCOSE UR QL STRIP: NEGATIVE
KETONES UR QL STRIP: NEGATIVE
LEUKOCYTE ESTERASE UR QL STRIP: NEGATIVE
PH, POC UA: 5.5
POC BLOOD, URINE: NEGATIVE
POC NITRATES, URINE: NEGATIVE
PROT UR QL STRIP: NEGATIVE
SP GR UR STRIP: 1.02 (ref 1–1.03)
UROBILINOGEN UR STRIP-ACNC: NORMAL (ref 0.3–2.2)

## 2023-11-17 PROCEDURE — 99203 OFFICE O/P NEW LOW 30 MIN: CPT | Mod: 25,S$GLB,, | Performed by: PHYSICIAN ASSISTANT

## 2023-11-17 PROCEDURE — 96372 THER/PROPH/DIAG INJ SC/IM: CPT | Mod: S$GLB,,, | Performed by: PHYSICIAN ASSISTANT

## 2023-11-17 PROCEDURE — 99203 PR OFFICE/OUTPT VISIT, NEW, LEVL III, 30-44 MIN: ICD-10-PCS | Mod: 25,S$GLB,, | Performed by: PHYSICIAN ASSISTANT

## 2023-11-17 PROCEDURE — 96372 PR INJECTION,THERAP/PROPH/DIAG2ST, IM OR SUBCUT: ICD-10-PCS | Mod: S$GLB,,, | Performed by: PHYSICIAN ASSISTANT

## 2023-11-17 PROCEDURE — 81003 POCT URINALYSIS, DIPSTICK, AUTOMATED, W/O SCOPE: ICD-10-PCS | Mod: QW,S$GLB,, | Performed by: PHYSICIAN ASSISTANT

## 2023-11-17 PROCEDURE — 81003 URINALYSIS AUTO W/O SCOPE: CPT | Mod: QW,S$GLB,, | Performed by: PHYSICIAN ASSISTANT

## 2023-11-17 RX ORDER — KETOROLAC TROMETHAMINE 30 MG/ML
30 INJECTION, SOLUTION INTRAMUSCULAR; INTRAVENOUS ONCE
Status: COMPLETED | OUTPATIENT
Start: 2023-11-17 | End: 2023-11-17

## 2023-11-17 RX ORDER — METHOCARBAMOL 500 MG/1
500 TABLET, FILM COATED ORAL 3 TIMES DAILY PRN
Qty: 15 TABLET | Refills: 0 | Status: SHIPPED | OUTPATIENT
Start: 2023-11-17 | End: 2023-11-22

## 2023-11-17 RX ADMIN — KETOROLAC TROMETHAMINE 30 MG: 30 INJECTION, SOLUTION INTRAMUSCULAR; INTRAVENOUS at 03:11

## 2023-11-17 NOTE — PROGRESS NOTES
"Subjective:      Patient ID: Jimi Clark is a 52 y.o. male.    Vitals:  height is 6' 3" (1.905 m) and weight is 113.4 kg (250 lb). His oral temperature is 98.1 °F (36.7 °C). His blood pressure is 131/76 and his pulse is 72. His respiration is 18 and oxygen saturation is 98%.     Chief Complaint: Back Pain    Pt c/o low back pain x 3 days,   no known injury,  pt states pain radiates to both hips  Denies constipation or  symptoms    Back Pain  This is a new problem. The current episode started in the past 7 days. The problem occurs constantly. The problem has been gradually worsening since onset. The pain is present in the lumbar spine. The pain is at a severity of 9/10. The pain is severe. The pain is The same all the time. The symptoms are aggravated by bending, lying down, sitting, stress, urinating, twisting, standing, position and coughing. Associated symptoms include leg pain. Pertinent negatives include no abdominal pain, bladder incontinence, bowel incontinence, chest pain, dysuria, fever, headaches, numbness, paresis, paresthesias, pelvic pain, perianal numbness, tingling, weakness or weight loss. He has tried NSAIDs, analgesics, bed rest, ice and muscle relaxant for the symptoms. The treatment provided mild relief.       Constitution: Negative for fever.   Cardiovascular:  Negative for chest pain.   Gastrointestinal:  Negative for abdominal pain and bowel incontinence.   Genitourinary: Negative.  Negative for dysuria, bladder incontinence and pelvic pain.   Musculoskeletal:  Positive for joint pain, abnormal ROM of joint, back pain, pain with walking, muscle ache and history of spine disorder.   Neurological:  Negative for headaches and numbness.      Objective:     Vitals:    11/17/23 1509   BP: 131/76   BP Location: Left arm   Patient Position: Sitting   BP Method: Large (Automatic)   Pulse: 72   Resp: 18   Temp: 98.1 °F (36.7 °C)   TempSrc: Oral   SpO2: 98%   Weight: 113.4 kg (250 lb)   Height: " "6' 3" (1.905 m)       Physical Exam   Constitutional: He is oriented to person, place, and time. He appears well-developed. He is cooperative. No distress.   HENT:   Head: Normocephalic and atraumatic.   Nose: Nose normal.   Mouth/Throat: Oropharynx is clear and moist and mucous membranes are normal.   Eyes: Conjunctivae and lids are normal.   Neck: Trachea normal and phonation normal. Neck supple.   Cardiovascular: Normal rate, regular rhythm, normal heart sounds and normal pulses.   Pulmonary/Chest: Effort normal and breath sounds normal.   Abdominal: Normal appearance and bowel sounds are normal. He exhibits no mass. Soft.   Musculoskeletal:         General: No deformity.      Thoracic back: Normal.      Lumbar back: He exhibits decreased range of motion, tenderness and spasm.        Back:    Neurological: He is alert and oriented to person, place, and time. He has normal strength and normal reflexes. No sensory deficit.   Skin: Skin is warm, dry, intact and not diaphoretic.   Psychiatric: His speech is normal and behavior is normal. Judgment and thought content normal.   Nursing note and vitals reviewed.      Assessment:     1. Acute bilateral low back pain, unspecified whether sciatica present    2. Muscle pain, lumbar      Results for orders placed or performed in visit on 11/17/23   POCT Urinalysis, Dipstick, Automated, W/O Scope   Result Value Ref Range    POC Blood, Urine Negative Negative    POC Bilirubin, Urine Negative Negative    POC Urobilinogen, Urine Normal 0.3 - 2.2    POC Ketones, Urine Negative Negative    POC Protein, Urine Negative Negative    POC Nitrates, Urine Negative Negative    POC Glucose, Urine Negative Negative    pH, UA 5.5     POC Specific Gravity, Urine 1.020 1.003 - 1.029    POC Leukocytes, Urine Negative Negative       Plan:       Acute bilateral low back pain, unspecified whether sciatica present  -     POCT Urinalysis, Dipstick, Automated, W/O Scope  -     ketorolac injection 30 " mg    Muscle pain, lumbar  -     methocarbamoL (ROBAXIN) 500 MG Tab; Take 1 tablet (500 mg total) by mouth 3 (three) times daily as needed (muscle pain).  Dispense: 15 tablet; Refill: 0          Medical Decision Making:   Initial Assessment:   No step offs Or deformities   No bowel or bladder incontinence  No numbness or tingling area Down leg   Muscle spasm palpated lumbar spine area  Clinical Tests:   Lab Tests: Ordered and Reviewed  The following lab test(s) were unremarkable: Urinalysis  Urgent Care Management:  IM TORADOL:   30 mg Toradol injection given in clinic today.  Patient denied any kidney, liver, or GI issues.    Patient monitored for 10-15 minutes afterwards with no immediate complications.         Patient Instructions   BACK PAIN:      Please avoid heavy lifting and strenuous exercise for the next several days.    - Alternate heat and ice for first 48 hours then  apply heat.   - You may do gently stretching within your limits of pain.  *Moist warm compresss to area several times daily.    May use a heating pad on LOW to provide heat over a towel which was dampended with warm water. DO NOT FALL ASLEEP WITH HEATING PAD ON.    - Do not stay in one position to long.    - When sleeping on your back place a pillow under knees to reduce tension on back.    - If sleeping on your side, place pillow between knees to keep spine in better alinement.    - Wear supportive shoes such as tennis shoes for support of the lower back.  Take any medication as directed.      30 mg Toradol injection given in clinic today since you did not mention any kidney, liver, or GI issues./       You have been prescribed  ROBAXIN for muscle pain.  This medication causes drowsiness.  Do not drink alcohol or operate motor vehicles while taking.      If you were not prescribed an anti-inflammatory medication, and if you do not have any history of stomach/intestinal ulcers, or kidney disease, or are not taking a blood thinner such as  Coumadin, Plavix, Pradaxa, Eloquis, or Xaralta for example, it is OK to take over the counter Ibuprofen or Advil or Motrin or Aleve as directed WHICH ARE (NSAIDS).  Do not take these medications on an empty stomach.    If you were prescribed a narcotic medication, do not drive or operate heavy equipment or machinery while taking these medications.      Please follow-up with your primary care provider if your symptoms continue.    Go to the emergency department for any new or worsening symptoms including but not limited to: loss of control of your bowel and/or bladder, sensation loss in between your legs by your genitalia and/or rectum.     If you have been discharged from the clinic prior to your point of care test results being completed, please make sure to check your Ry[employer] account.  If there is a change in treatment, we will communicate with you through here.  If your test is positive, and medications are ordered, these will be sent to your preferred pharmacy.   If your test is negative, no further steps needed. If you do not hear from us or have questions, please call the clinic.      - You must understand that you have received an Urgent Care treatment only and that you may be released before all of your medical problems are known or treated.   - You, the patient, will arrange for follow up care as instructed with your primary care provider or recommended specialist.   - If your condition worsens or fails to improve we recommend that you receive another evaluation at the ER immediately or contact your PCP to discuss your concerns, or return here.   - Please do not drive or make any important decisions for 24 hours if you have received any pain medications, sedatives or mood altering drugs during your visit.    Disclaimer: This document was drafted with the use of a voice recognition device and is likely to have sound alike errors.

## 2023-11-17 NOTE — LETTER
November 17, 2023      [facility]  [address], [address]  [place] [address]  Phone: [phone]       Patient: Jimi Clark   YOB: 1970  Date of Visit: 11/17/2023    To Whom It May Concern:    Thanh Clark  was at [facility] on 11/17/2023. The patient may return to work/school on 11/20 with no restrictions. If you have any questions or concerns, or if I can be of further assistance, please do not hesitate to contact me.    Sincerely,        Cady Tong PA-C

## 2023-11-17 NOTE — PATIENT INSTRUCTIONS
BACK PAIN:      Please avoid heavy lifting and strenuous exercise for the next several days.    - Alternate heat and ice for first 48 hours then  apply heat.   - You may do gently stretching within your limits of pain.  *Moist warm compresss to area several times daily.    May use a heating pad on LOW to provide heat over a towel which was dampended with warm water. DO NOT FALL ASLEEP WITH HEATING PAD ON.    - Do not stay in one position to long.    - When sleeping on your back place a pillow under knees to reduce tension on back.    - If sleeping on your side, place pillow between knees to keep spine in better alinement.    - Wear supportive shoes such as tennis shoes for support of the lower back.  Take any medication as directed.      30 mg Toradol injection given in clinic today since you did not mention any kidney, liver, or GI issues./       You have been prescribed  ROBAXIN for muscle pain.  This medication causes drowsiness.  Do not drink alcohol or operate motor vehicles while taking.      If you were not prescribed an anti-inflammatory medication, and if you do not have any history of stomach/intestinal ulcers, or kidney disease, or are not taking a blood thinner such as Coumadin, Plavix, Pradaxa, Eloquis, or Xaralta for example, it is OK to take over the counter Ibuprofen or Advil or Motrin or Aleve as directed WHICH ARE (NSAIDS).  Do not take these medications on an empty stomach.    If you were prescribed a narcotic medication, do not drive or operate heavy equipment or machinery while taking these medications.      Please follow-up with your primary care provider if your symptoms continue.    Go to the emergency department for any new or worsening symptoms including but not limited to: loss of control of your bowel and/or bladder, sensation loss in between your legs by your genitalia and/or rectum.     If you have been discharged from the clinic prior to your point of care test results being completed,  please make sure to check your Ac[employer] account.  If there is a change in treatment, we will communicate with you through here.  If your test is positive, and medications are ordered, these will be sent to your preferred pharmacy.   If your test is negative, no further steps needed. If you do not hear from us or have questions, please call the clinic.      - You must understand that you have received an Urgent Care treatment only and that you may be released before all of your medical problems are known or treated.   - You, the patient, will arrange for follow up care as instructed with your primary care provider or recommended specialist.   - If your condition worsens or fails to improve we recommend that you receive another evaluation at the ER immediately or contact your PCP to discuss your concerns, or return here.   - Please do not drive or make any important decisions for 24 hours if you have received any pain medications, sedatives or mood altering drugs during your visit.    Disclaimer: This document was drafted with the use of a voice recognition device and is likely to have sound alike errors.

## 2025-01-02 ENCOUNTER — OFFICE VISIT (OUTPATIENT)
Dept: PULMONOLOGY | Facility: CLINIC | Age: 55
End: 2025-01-02
Payer: COMMERCIAL

## 2025-01-02 VITALS
HEIGHT: 75 IN | OXYGEN SATURATION: 97 % | DIASTOLIC BLOOD PRESSURE: 84 MMHG | RESPIRATION RATE: 21 BRPM | WEIGHT: 245 LBS | HEART RATE: 70 BPM | SYSTOLIC BLOOD PRESSURE: 138 MMHG | BODY MASS INDEX: 30.46 KG/M2

## 2025-01-02 DIAGNOSIS — G47.33 OSA (OBSTRUCTIVE SLEEP APNEA): Primary | ICD-10-CM

## 2025-01-02 DIAGNOSIS — G47.00 INSOMNIA, UNSPECIFIED TYPE: ICD-10-CM

## 2025-01-02 DIAGNOSIS — G47.26 SHIFTING SLEEP-WORK SCHEDULE: ICD-10-CM

## 2025-01-02 DIAGNOSIS — E66.811 OBESITY, CLASS I, BMI 30-34.9: ICD-10-CM

## 2025-01-02 PROCEDURE — 3008F BODY MASS INDEX DOCD: CPT | Mod: CPTII,S$GLB,, | Performed by: NURSE PRACTITIONER

## 2025-01-02 PROCEDURE — 3075F SYST BP GE 130 - 139MM HG: CPT | Mod: CPTII,S$GLB,, | Performed by: NURSE PRACTITIONER

## 2025-01-02 PROCEDURE — 99213 OFFICE O/P EST LOW 20 MIN: CPT | Mod: S$GLB,,, | Performed by: NURSE PRACTITIONER

## 2025-01-02 PROCEDURE — 99999 PR PBB SHADOW E&M-EST. PATIENT-LVL V: CPT | Mod: PBBFAC,,, | Performed by: NURSE PRACTITIONER

## 2025-01-02 PROCEDURE — 3079F DIAST BP 80-89 MM HG: CPT | Mod: CPTII,S$GLB,, | Performed by: NURSE PRACTITIONER

## 2025-01-02 PROCEDURE — 1160F RVW MEDS BY RX/DR IN RCRD: CPT | Mod: CPTII,S$GLB,, | Performed by: NURSE PRACTITIONER

## 2025-01-02 PROCEDURE — 1159F MED LIST DOCD IN RCRD: CPT | Mod: CPTII,S$GLB,, | Performed by: NURSE PRACTITIONER

## 2025-01-02 NOTE — PROGRESS NOTES
"Subjective:      Patient ID: Jimi Clark is a 54 y.o. male.    Chief Complaint: Sleep Apnea and Follow-up    HPI  Presents to office for review of AutoPAP therapy. Patient states improved symptoms with use of AutoPAP. Sleeping more soundly. Waking up feeling more refreshed. Improved daytime sleepiness. Patient states he is benefiting from use of the AutoPAP. 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 Not getting enough sleep on some days.  BMI 30. 12 lb weight loss since last year.    Patient Active Problem List   Diagnosis    Allergic rhinitis    Essential hypertension    EKG abnormalities    Family history of vascular disease    Back pain    Lower abdominal pain    Change in bowel function    RUQ abdominal pain    Colon polyps    Decreased ROM of ankle    Daytime sleepiness    LEIGHANN (obstructive sleep apnea)     /84 (BP Location: Left arm, Patient Position: Sitting)   Pulse 70   Resp (!) 21   Ht 6' 3" (1.905 m)   Wt 111.1 kg (245 lb)   SpO2 97%   BMI 30.62 kg/m²   Body mass index is 30.62 kg/m².    Review of Systems   Constitutional:  Positive for fatigue.   HENT: Negative.     Respiratory: Negative.     Cardiovascular: Negative.    Musculoskeletal: Negative.    Gastrointestinal: Negative.    Neurological: Negative.    Psychiatric/Behavioral: Negative.       Objective:      Physical Exam  Constitutional:       Appearance: He is obese.   HENT:      Head: Normocephalic and atraumatic.      Nose: Nose normal.   Cardiovascular:      Rate and Rhythm: Normal rate and regular rhythm.   Pulmonary:      Effort: Pulmonary effort is normal.      Breath sounds: Normal breath sounds.   Abdominal:      Palpations: Abdomen is soft.      Tenderness: There is no abdominal tenderness.   Musculoskeletal:         General: Normal range of motion.      Cervical back: Normal range of motion and neck supple.   Skin:     General: Skin is warm and dry.   Neurological:      General: No focal deficit present.      Mental " Status: He is alert and oriented to person, place, and time.   Psychiatric:         Mood and Affect: Mood normal.         Behavior: Behavior normal.       Personal Diagnostic Review      1/2/2025     1:38 PM   EPWORTH SLEEPINESS SCALE   Sitting and reading 0   Watching TV 0   Sitting, inactive in a public place (e.g. a theatre or a meeting) 0   As a passenger in a car for an hour without a break 0   Lying down to rest in the afternoon when circumstances permit 3   Sitting and talking to someone 0   Sitting quietly after a lunch without alcohol 0   In a car, while stopped for a few minutes in traffic 0   Total score 3      Compliance Report  Compliance  Payor Standard  Usage 11/29/2024 - 12/28/2024  Usage days 7/30 days (23%)  >= 4 hours 3 days (10%)  < 4 hours 4 days (13%)  Usage hours 31 hours 13 minutes  Average usage (total days) 1 hours 2 minutes  Average usage (days used) 4 hours 28 minutes  Median usage (days used) 3 hours 46 minutes  Total used hours (value since last reset - 12/28/2024) 1,079 hours  AirSense 11 AutoSet  Serial number [id number]  Mode AutoSet  Min Pressure 5 cmH2O  Max Pressure 20 cmH2O  EPR Fulltime  EPR level 3  Response Standard  Therapy  Pressure - cmH2O Median: 15.8 95th percentile: 19.3 Maximum: 19.7  Leaks - L/min Median: 0.0 95th percentile: 0.3 Maximum: 18.2  Events per hour AI: 0.4 HI: 0.1 AHI: 0.5  Apnea Index Central: 0.0 Obstructive: 0.4 Unknown: 0.0  RERA Index 0.4  Cheyne-[place] respiration (average duration per night) 0 minutes (0%)  Usage - hours  Printed on      Assessment:       1. LEIGHANN (obstructive sleep apnea)    2. Shifting sleep-work schedule    3. Obesity, Class I, BMI 30-34.9    4. Insomnia, unspecified type        Outpatient Encounter Medications as of 1/2/2025   Medication Sig Dispense Refill    amLODIPine (NORVASC) 10 MG tablet TAKE 1 TABLET BY MOUTH EVERY DAY 90 tablet 3    azelastine-fluticasone (DYMISTA) 137-50 mcg/spray Spry nassal spray 1 spray by Each Nostril  route 2 (two) times daily. 23 g 0    betamethasone valerate (LUXIQ) 0.12 % foam 2 (two) times daily.  2    CELACYN GlwP APPLY 1 GRAM ON THE SKIN BID AA  3    celecoxib (CELEBREX) 200 MG capsule Take 200 mg by mouth 2 (two) times daily.      desoximetasone 0.05 % Oint ELODIA TO CHEST BID  1    GRALISE 600 mg Tb24 SMARTSI Tablet(s) By Mouth Every Evening      ketoconazole (NIZORAL) 2 % shampoo       loratadine-pseudoephedrine 5-120 mg (CLARITIN-D 12-HOUR) 5-120 mg per tablet Take 1 tablet by mouth.      losartan (COZAAR) 100 MG tablet Take 100 mg by mouth every morning.      neomycin-polymyxin-dexamethasone (DEXACINE) 3.5 mg/g-10,000 unit/g-0.1 % Oint SMARTSI.125 Inch(es) In Eye(s) Twice Daily      polyethylene glycol (GLYCOLAX) 17 gram PwPk 1 pack every other day for a couple of weeks to regulate bowels 10 each 0    TEXACORT 2.5 % Soln APPLY ON THE SKIN QOD WITH QTIP  2    busPIRone (BUSPAR) 10 MG tablet TAKE 1 TABLET BY MOUTH TWICE A DAY (Patient not taking: Reported on 2025) 180 tablet 1    citalopram (CELEXA) 10 MG tablet Take 1 tablet by mouth every morning.      levocetirizine (XYZAL) 5 MG tablet Take 1 tablet by mouth every evening.      predniSONE (DELTASONE) 10 MG tablet Take 1 tablet (10 mg total) by mouth once daily. Take 3 tablets a day for 3 days (1before breakfast, 1 after lunch, 1 before bed) Then take 2 tablets a day for 3 days (1 before breakfast, 1 before bed) Then take 1 tablet a day for 3 days (1 before breakfast) (Patient not taking: Reported on 2025) 18 tablet 0     No facility-administered encounter medications on file as of 2025.     No orders of the defined types were placed in this encounter.    Plan:   Increase time on CPAP benefits from use  Try to obtain enough sleep time - shift work  Weight loss and exercise to improve overall health.      1. LEIGHANN (obstructive sleep apnea)    2. Shifting sleep-work schedule    3. Obesity, Class I, BMI 30-34.9    4. Insomnia, unspecified  type                        Elizabeth LeJeune, ACNP, ANP